# Patient Record
Sex: FEMALE | Race: WHITE | Employment: OTHER | URBAN - METROPOLITAN AREA
[De-identification: names, ages, dates, MRNs, and addresses within clinical notes are randomized per-mention and may not be internally consistent; named-entity substitution may affect disease eponyms.]

---

## 2017-06-26 LAB
AVERAGE GLUCOSE: NORMAL
HBA1C MFR BLD: 7.1 %

## 2017-07-06 ENCOUNTER — OFFICE VISIT (OUTPATIENT)
Dept: PRIMARY CARE CLINIC | Age: 57
End: 2017-07-06

## 2017-07-06 VITALS
WEIGHT: 194.7 LBS | RESPIRATION RATE: 14 BRPM | OXYGEN SATURATION: 98 % | DIASTOLIC BLOOD PRESSURE: 80 MMHG | HEART RATE: 100 BPM | HEIGHT: 60 IN | BODY MASS INDEX: 38.22 KG/M2 | SYSTOLIC BLOOD PRESSURE: 138 MMHG | TEMPERATURE: 98.5 F

## 2017-07-06 DIAGNOSIS — Z12.39 SCREENING FOR BREAST CANCER: ICD-10-CM

## 2017-07-06 DIAGNOSIS — E11.9 TYPE 2 DIABETES MELLITUS WITHOUT COMPLICATION, WITHOUT LONG-TERM CURRENT USE OF INSULIN (HCC): ICD-10-CM

## 2017-07-06 DIAGNOSIS — Z12.11 SCREEN FOR COLON CANCER: ICD-10-CM

## 2017-07-06 DIAGNOSIS — E11.9 TYPE 2 DIABETES MELLITUS WITHOUT COMPLICATION, WITHOUT LONG-TERM CURRENT USE OF INSULIN (HCC): Primary | ICD-10-CM

## 2017-07-06 DIAGNOSIS — Z12.31 SCREENING MAMMOGRAM, ENCOUNTER FOR: ICD-10-CM

## 2017-07-06 DIAGNOSIS — M62.830 BACK SPASM: ICD-10-CM

## 2017-07-06 LAB
CREATININE URINE: 47.9 MG/DL
MICROALBUMIN UR-MCNC: 2.5 MG/DL
MICROALBUMIN/CREAT UR-RTO: 52.2 MG/G (ref 0–30)

## 2017-07-06 PROCEDURE — 3017F COLORECTAL CA SCREEN DOC REV: CPT | Performed by: INTERNAL MEDICINE

## 2017-07-06 PROCEDURE — G8417 CALC BMI ABV UP PARAM F/U: HCPCS | Performed by: INTERNAL MEDICINE

## 2017-07-06 PROCEDURE — 4004F PT TOBACCO SCREEN RCVD TLK: CPT | Performed by: INTERNAL MEDICINE

## 2017-07-06 PROCEDURE — 3046F HEMOGLOBIN A1C LEVEL >9.0%: CPT | Performed by: INTERNAL MEDICINE

## 2017-07-06 PROCEDURE — G8427 DOCREV CUR MEDS BY ELIG CLIN: HCPCS | Performed by: INTERNAL MEDICINE

## 2017-07-06 PROCEDURE — 3014F SCREEN MAMMO DOC REV: CPT | Performed by: INTERNAL MEDICINE

## 2017-07-06 PROCEDURE — 99214 OFFICE O/P EST MOD 30 MIN: CPT | Performed by: INTERNAL MEDICINE

## 2017-07-06 RX ORDER — ACARBOSE 25 MG/1
25 TABLET ORAL DAILY
COMMUNITY
Start: 2017-07-01 | End: 2017-07-06 | Stop reason: SDUPTHER

## 2017-07-06 RX ORDER — OMEPRAZOLE 20 MG/1
20 CAPSULE, DELAYED RELEASE ORAL DAILY
COMMUNITY
Start: 2017-07-01 | End: 2019-01-22 | Stop reason: ALTCHOICE

## 2017-07-06 RX ORDER — ACARBOSE 25 MG/1
25 TABLET ORAL
Qty: 90 TABLET | Refills: 11 | Status: SHIPPED | OUTPATIENT
Start: 2017-07-06 | End: 2021-07-06 | Stop reason: SDUPTHER

## 2017-07-06 RX ORDER — AMITRIPTYLINE HYDROCHLORIDE 10 MG/1
10 TABLET, FILM COATED ORAL DAILY
COMMUNITY
Start: 2017-07-01 | End: 2022-04-01

## 2017-07-06 RX ORDER — OXYCODONE HYDROCHLORIDE AND ACETAMINOPHEN 5; 325 MG/1; MG/1
1 TABLET ORAL EVERY 6 HOURS PRN
COMMUNITY
Start: 2017-07-01

## 2017-07-06 RX ORDER — DIAZEPAM 5 MG/1
5 TABLET ORAL DAILY
Qty: 30 TABLET | Refills: 0 | Status: SHIPPED | OUTPATIENT
Start: 2017-07-06 | End: 2021-07-06 | Stop reason: ALTCHOICE

## 2017-07-06 RX ORDER — ATORVASTATIN CALCIUM 40 MG/1
40 TABLET, FILM COATED ORAL DAILY
COMMUNITY

## 2017-07-06 RX ORDER — DIAZEPAM 5 MG/1
5 TABLET ORAL EVERY 6 HOURS
COMMUNITY
Start: 2017-07-01 | End: 2017-07-06 | Stop reason: SDUPTHER

## 2017-07-06 RX ORDER — GABAPENTIN 400 MG/1
400 CAPSULE ORAL 3 TIMES DAILY
COMMUNITY
Start: 2017-06-08 | End: 2019-01-22 | Stop reason: ALTCHOICE

## 2017-07-06 RX ORDER — HYDROCODONE BITARTRATE AND ACETAMINOPHEN 5; 325 MG/1; MG/1
TABLET ORAL
COMMUNITY
Start: 2017-06-10 | End: 2017-07-06

## 2017-07-06 ASSESSMENT — PATIENT HEALTH QUESTIONNAIRE - PHQ9
1. LITTLE INTEREST OR PLEASURE IN DOING THINGS: 0
2. FEELING DOWN, DEPRESSED OR HOPELESS: 0
SUM OF ALL RESPONSES TO PHQ QUESTIONS 1-9: 0
SUM OF ALL RESPONSES TO PHQ9 QUESTIONS 1 & 2: 0

## 2017-07-08 ASSESSMENT — ENCOUNTER SYMPTOMS
CHOKING: 0
APNEA: 0
FACIAL SWELLING: 0
BLURRED VISION: 0
ABDOMINAL DISTENTION: 0
PHOTOPHOBIA: 0
BACK PAIN: 1
BLOOD IN STOOL: 0

## 2017-07-10 DIAGNOSIS — Z12.11 SCREENING FOR COLON CANCER: Primary | ICD-10-CM

## 2018-09-06 ENCOUNTER — HOSPITAL ENCOUNTER (OUTPATIENT)
Dept: GENERAL RADIOLOGY | Age: 58
Discharge: HOME | End: 2018-09-08
Payer: MEDICARE

## 2018-09-06 DIAGNOSIS — M54.5 LOW BACK PAIN, UNSPECIFIED BACK PAIN LATERALITY, UNSPECIFIED CHRONICITY, WITH SCIATICA PRESENCE UNSPECIFIED: ICD-10-CM

## 2018-09-06 PROCEDURE — 72110 X-RAY EXAM L-2 SPINE 4/>VWS: CPT

## 2018-10-03 ENCOUNTER — TELEPHONE (OUTPATIENT)
Dept: ENDOCRINOLOGY | Age: 58
End: 2018-10-03

## 2018-11-05 LAB
GLUCOSE BLD-MCNC: 140 MG/DL (ref 70–100)
GLUCOSE BLD-MCNC: 98 MG/DL (ref 70–100)
TROPONIN: 0.64 NG/ML (ref 0–0.04)

## 2018-11-06 LAB
ACTIVATED CLOTTING TIME, LOW RANGE: 280 SEC
ANION GAP SERPL CALCULATED.3IONS-SCNC: 12 MMOL/L (ref 10–20)
BICARBONATE: 24 MMOL/L (ref 21–32)
BUN / CREAT RATIO: 10 (ref 5–25)
CALCIUM SERPL-MCNC: 9.6 MG/DL (ref 8.6–10.3)
CHLORIDE BLD-SCNC: 108 MMOL/L (ref 98–107)
CHOLESTEROL/HDL RATIO: 6.6
CHOLESTEROL: 322 MG/DL
CREAT SERPL-MCNC: 0.7 MG/DL (ref 0.5–1.05)
ERYTHROCYTE [DISTWIDTH] IN BLOOD BY AUTOMATED COUNT: 13.6 % (ref 12–15.4)
ERYTHROCYTE [DISTWIDTH] IN BLOOD BY AUTOMATED COUNT: 44.5 FL (ref 39.3–48.6)
GFR CALCULATED: >60
GLUCOSE BLD-MCNC: 112 MG/DL (ref 70–100)
GLUCOSE BLD-MCNC: 189 MG/DL (ref 70–100)
GLUCOSE BLD-MCNC: 232 MG/DL (ref 70–100)
GLUCOSE BLD-MCNC: 244 MG/DL (ref 70–100)
GLUCOSE: 107 MG/DL (ref 70–100)
HBA1C MFR BLD: 6.1 % (ref 4–6)
HCT VFR BLD CALC: 40.1 % (ref 36.5–46.6)
HDLC SERPL-MCNC: 49 MG/DL
HEMOGLOBIN: 12.6 G/DL (ref 11.8–15.3)
LDL CHOLESTEROL CALCULATED: 220 MG/DL
MCH RBC QN AUTO: 28.3 PG (ref 27.5–33)
MCHC RBC AUTO-ENTMCNC: 31.4 G/DL (ref 30.1–35)
MCV RBC AUTO: 90.1 FL (ref 85.4–100)
NUCLEATED RBCS: 0 /100{WBCS}
PLATELET # BLD: 183 10*3/UL (ref 155–404)
PMV BLD AUTO: 11.2 FL (ref 9.9–12.1)
POTASSIUM SERPL-SCNC: 3.8 MMOL/L (ref 3.5–5.1)
RBC: 4.45 10*6/UL (ref 3.85–5.1)
RBCS COUNTED: 0 10*3/UL
SODIUM BLD-SCNC: 140 MMOL/L (ref 136–145)
TRIGL SERPL-MCNC: 265 MG/DL
TROPONIN: 0.64 NG/ML (ref 0–0.04)
UREA NITROGEN: 7 MG/DL (ref 6–23)
VLDLC SERPL CALC-MCNC: 53 MG/DL
WBC: 8.5 10*3/UL (ref 4.4–9.9)

## 2018-11-07 LAB
GLUCOSE BLD-MCNC: 114 MG/DL (ref 70–100)
GLUCOSE BLD-MCNC: 154 MG/DL (ref 70–100)
GLUCOSE BLD-MCNC: 205 MG/DL (ref 70–100)

## 2018-11-08 ENCOUNTER — TELEPHONE (OUTPATIENT)
Dept: PRIMARY CARE CLINIC | Age: 58
End: 2018-11-08

## 2018-11-09 ENCOUNTER — TELEPHONE (OUTPATIENT)
Dept: PRIMARY CARE CLINIC | Age: 58
End: 2018-11-09

## 2019-01-22 PROBLEM — D50.8 IRON DEFICIENCY ANEMIA DUE TO DIETARY CAUSES: Status: ACTIVE | Noted: 2019-01-22

## 2019-01-22 PROBLEM — D59.8 OTHER ACQUIRED HEMOLYTIC ANEMIAS (HCC): Status: ACTIVE | Noted: 2019-01-22

## 2019-02-19 PROBLEM — K90.9 MALABSORPTION OF IRON: Status: ACTIVE | Noted: 2019-02-19

## 2019-04-29 DIAGNOSIS — D50.8 IRON DEFICIENCY ANEMIA DUE TO DIETARY CAUSES: ICD-10-CM

## 2019-04-29 DIAGNOSIS — D59.8 OTHER ACQUIRED HEMOLYTIC ANEMIAS (HCC): ICD-10-CM

## 2019-04-29 LAB
FERRITIN: 73 NG/ML (ref 13–150)
IRON SATURATION: 18 % (ref 11–46)
IRON: 70 UG/DL (ref 37–145)
TOTAL IRON BINDING CAPACITY: 388 UG/DL (ref 178–450)

## 2019-10-23 DIAGNOSIS — Z12.31 ENCOUNTER FOR SCREENING MAMMOGRAM FOR MALIGNANT NEOPLASM OF BREAST: Primary | ICD-10-CM

## 2020-01-21 LAB — VITAMIN D 25-HYDROXY: 38 NG/ML

## 2020-06-27 ENCOUNTER — TELEPHONE (OUTPATIENT)
Dept: PRIMARY CARE CLINIC | Age: 60
End: 2020-06-27

## 2020-07-04 RX ORDER — AMOXICILLIN AND CLAVULANATE POTASSIUM 875; 125 MG/1; MG/1
1 TABLET, FILM COATED ORAL 2 TIMES DAILY
Qty: 20 TABLET | Refills: 0 | Status: SHIPPED | OUTPATIENT
Start: 2020-07-04 | End: 2020-07-14

## 2020-07-06 ENCOUNTER — NURSE ONLY (OUTPATIENT)
Dept: PRIMARY CARE CLINIC | Age: 60
End: 2020-07-06

## 2020-07-06 DIAGNOSIS — R50.9 FEVER, UNSPECIFIED FEVER CAUSE: ICD-10-CM

## 2020-07-09 LAB
SARS-COV-2: NOT DETECTED
SOURCE: NORMAL

## 2020-07-15 ENCOUNTER — TELEPHONE (OUTPATIENT)
Dept: PRIMARY CARE CLINIC | Age: 60
End: 2020-07-15

## 2020-08-20 ENCOUNTER — TELEPHONE (OUTPATIENT)
Dept: PRIMARY CARE CLINIC | Age: 60
End: 2020-08-20

## 2020-10-04 ENCOUNTER — TELEPHONE (OUTPATIENT)
Dept: PRIMARY CARE CLINIC | Age: 60
End: 2020-10-04

## 2020-10-04 NOTE — TELEPHONE ENCOUNTER
Juan Bailey was contacted to set up a video visit with Lorenzo Gray MD.     Spoke with: no answer     Patient encouraged to sign up for MyChart in order to complete Virtual Visits, if MyChart status was not already active. Patient not agreeableto sign up for a Virtual Visit with PCP within the next week. Reason for declining VV with PCP in the next week:  Other - left message       Helen Narayan

## 2021-07-06 ENCOUNTER — OFFICE VISIT (OUTPATIENT)
Dept: PRIMARY CARE CLINIC | Age: 61
End: 2021-07-06
Payer: MEDICARE

## 2021-07-06 VITALS
SYSTOLIC BLOOD PRESSURE: 134 MMHG | HEIGHT: 60 IN | OXYGEN SATURATION: 98 % | HEART RATE: 63 BPM | WEIGHT: 169 LBS | RESPIRATION RATE: 16 BRPM | DIASTOLIC BLOOD PRESSURE: 66 MMHG | BODY MASS INDEX: 33.18 KG/M2

## 2021-07-06 DIAGNOSIS — E11.9 TYPE 2 DIABETES MELLITUS WITHOUT COMPLICATION, WITHOUT LONG-TERM CURRENT USE OF INSULIN (HCC): ICD-10-CM

## 2021-07-06 DIAGNOSIS — R31.9 HEMATURIA, UNSPECIFIED TYPE: ICD-10-CM

## 2021-07-06 DIAGNOSIS — Z00.00 HEALTH CARE MAINTENANCE: ICD-10-CM

## 2021-07-06 DIAGNOSIS — R31.9 HEMATURIA, UNSPECIFIED TYPE: Primary | ICD-10-CM

## 2021-07-06 DIAGNOSIS — D59.8 OTHER ACQUIRED HEMOLYTIC ANEMIAS (HCC): ICD-10-CM

## 2021-07-06 DIAGNOSIS — Z12.31 ENCOUNTER FOR SCREENING MAMMOGRAM FOR BREAST CANCER: ICD-10-CM

## 2021-07-06 LAB
BACTERIA: NEGATIVE /HPF
BILIRUBIN URINE: NEGATIVE
BILIRUBIN, POC: NORMAL
BLOOD URINE, POC: NORMAL
BLOOD, URINE: ABNORMAL
CLARITY, POC: CLEAR
CLARITY: CLEAR
COLOR, POC: NORMAL
COLOR: YELLOW
CREATININE URINE: 43.2 MG/DL
EPITHELIAL CELLS, UA: ABNORMAL /HPF (ref 0–5)
GLUCOSE URINE, POC: NORMAL
GLUCOSE URINE: NEGATIVE MG/DL
HYALINE CASTS: ABNORMAL /HPF (ref 0–5)
KETONES, POC: NORMAL
KETONES, URINE: NEGATIVE MG/DL
LEUKOCYTE EST, POC: NORMAL
LEUKOCYTE ESTERASE, URINE: ABNORMAL
MICROALBUMIN UR-MCNC: <1.2 MG/DL
MICROALBUMIN/CREAT UR-RTO: NORMAL MG/G (ref 0–30)
NITRITE, POC: NORMAL
NITRITE, URINE: NEGATIVE
PH UA: 7.5 (ref 5–9)
PH, POC: 7
PROTEIN UA: NEGATIVE MG/DL
PROTEIN, POC: NORMAL
RBC UA: >100 /HPF (ref 0–5)
SPECIFIC GRAVITY UA: 1.01 (ref 1–1.03)
SPECIFIC GRAVITY, POC: 1.01
UROBILINOGEN, POC: NORMAL
UROBILINOGEN, URINE: 0.2 E.U./DL
WBC UA: ABNORMAL /HPF (ref 0–5)

## 2021-07-06 PROCEDURE — 81002 URINALYSIS NONAUTO W/O SCOPE: CPT | Performed by: INTERNAL MEDICINE

## 2021-07-06 PROCEDURE — 2022F DILAT RTA XM EVC RTNOPTHY: CPT | Performed by: INTERNAL MEDICINE

## 2021-07-06 PROCEDURE — 1036F TOBACCO NON-USER: CPT | Performed by: INTERNAL MEDICINE

## 2021-07-06 PROCEDURE — 3046F HEMOGLOBIN A1C LEVEL >9.0%: CPT | Performed by: INTERNAL MEDICINE

## 2021-07-06 PROCEDURE — 3017F COLORECTAL CA SCREEN DOC REV: CPT | Performed by: INTERNAL MEDICINE

## 2021-07-06 PROCEDURE — G8427 DOCREV CUR MEDS BY ELIG CLIN: HCPCS | Performed by: INTERNAL MEDICINE

## 2021-07-06 PROCEDURE — G8417 CALC BMI ABV UP PARAM F/U: HCPCS | Performed by: INTERNAL MEDICINE

## 2021-07-06 PROCEDURE — 99214 OFFICE O/P EST MOD 30 MIN: CPT | Performed by: INTERNAL MEDICINE

## 2021-07-06 RX ORDER — TIZANIDINE 4 MG/1
TABLET ORAL
COMMUNITY
Start: 2021-06-26

## 2021-07-06 RX ORDER — ACARBOSE 25 MG/1
25 TABLET ORAL
Qty: 90 TABLET | Refills: 1 | Status: SHIPPED | OUTPATIENT
Start: 2021-07-06 | End: 2021-09-30 | Stop reason: SDUPTHER

## 2021-07-06 RX ORDER — AMITRIPTYLINE HYDROCHLORIDE 25 MG/1
TABLET, FILM COATED ORAL
COMMUNITY
End: 2021-12-30

## 2021-07-06 SDOH — ECONOMIC STABILITY: FOOD INSECURITY: WITHIN THE PAST 12 MONTHS, THE FOOD YOU BOUGHT JUST DIDN'T LAST AND YOU DIDN'T HAVE MONEY TO GET MORE.: NEVER TRUE

## 2021-07-06 SDOH — ECONOMIC STABILITY: FOOD INSECURITY: WITHIN THE PAST 12 MONTHS, YOU WORRIED THAT YOUR FOOD WOULD RUN OUT BEFORE YOU GOT MONEY TO BUY MORE.: NEVER TRUE

## 2021-07-06 ASSESSMENT — ENCOUNTER SYMPTOMS
BACK PAIN: 1
SHORTNESS OF BREATH: 0
VOMITING: 0
WHEEZING: 0
COUGH: 0
DIARRHEA: 0
NAUSEA: 0
ABDOMINAL PAIN: 0

## 2021-07-06 ASSESSMENT — SOCIAL DETERMINANTS OF HEALTH (SDOH): HOW HARD IS IT FOR YOU TO PAY FOR THE VERY BASICS LIKE FOOD, HOUSING, MEDICAL CARE, AND HEATING?: NOT VERY HARD

## 2021-07-06 NOTE — PROGRESS NOTES
Subjective:      Patient ID: Juan Bailey is a 61 y.o. female    Blood in urine x 4 days   HPI  Pt presents with 4 days of pink-colored urine and blood clots in urine. Assoc burning urination, frequency and urgency. No fever or chills. Assoc lower back (not flank ) pain. No LE weakness. Pt has been on Plavix for 18 months s/p CAD with stents. Hx toacco smoking, quit smoking in 2018. HCC: other acquired hemolytic anemia- stable on iron infusion. Needs acarbose refill. LAs A1c 6.1  Past Medical History:   Diagnosis Date    Anxiety     Depression     Fibromyalgia     Hepatitis     Hyperlipidemia     Hypertension     Osteoarthritis     SHOULDER    Restless legs syndrome     Type II or unspecified type diabetes mellitus without mention of complication, not stated as uncontrolled      Past Surgical History:   Procedure Laterality Date    COLONOSCOPY      HYSTERECTOMY      MAMMO IMPLANT DIGITAL DIAG BI  03/02/2011    TONSILLECTOMY AND ADENOIDECTOMY       Social History     Socioeconomic History    Marital status:      Spouse name: Not on file    Number of children: Not on file    Years of education: Not on file    Highest education level: Not on file   Occupational History    Not on file   Tobacco Use    Smoking status: Former Smoker     Packs/day: 0.05     Types: Cigarettes    Smokeless tobacco: Never Used   Substance and Sexual Activity    Alcohol use:  Yes     Alcohol/week: 0.0 standard drinks     Comment: social    Drug use: No    Sexual activity: Not on file   Other Topics Concern    Not on file   Social History Narrative    Not on file     Social Determinants of Health     Financial Resource Strain: Low Risk     Difficulty of Paying Living Expenses: Not very hard   Food Insecurity: No Food Insecurity    Worried About Running Out of Food in the Last Year: Never true    Ba of Food in the Last Year: Never true   Transportation Needs:     Lack of Transportation (Medical):      Lack of Transportation (Non-Medical):    Physical Activity:     Days of Exercise per Week:     Minutes of Exercise per Session:    Stress:     Feeling of Stress :    Social Connections:     Frequency of Communication with Friends and Family:     Frequency of Social Gatherings with Friends and Family:     Attends Mosque Services:     Active Member of Clubs or Organizations:     Attends Club or Organization Meetings:     Marital Status:    Intimate Partner Violence:     Fear of Current or Ex-Partner:     Emotionally Abused:     Physically Abused:     Sexually Abused:      Family History   Problem Relation Age of Onset    Diabetes Mother     Diabetes Father     Heart Attack Father     Heart Disease Father     Diabetes Brother     Depression Daughter      Allergies:  Dye [iodides], Peanut (diagnostic), Avelox [moxifloxacin hcl in nacl], Metformin, and Sulfa antibiotics  Patient Active Problem List   Diagnosis    Type 2 diabetes mellitus without complication, without long-term current use of insulin ([facility] [address]    Depressive disorder, not elsewhere classified    Essential hypertension, benign    Low back pain    DDD (degenerative disc disease), lumbar    Ulnar neuropathy    Iron deficiency anemia due to dietary causes    Other acquired hemolytic anemias ([facility] [address]    Malabsorption of iron     Current Outpatient Medications on File Prior to Visit   Medication Sig Dispense Refill    amitriptyline (ELAVIL) 25 MG tablet Take by mouth      tiZANidine (ZANAFLEX) 4 MG tablet       clopidogrel (PLAVIX) 75 MG tablet Take 1 tablet by mouth daily  3    metoprolol tartrate (LOPRESSOR) 50 MG tablet Take 1 tablet by mouth 2 times daily  5    nitroGLYCERIN (NITROSTAT) 0.4 MG SL tablet Place 1 tablet under the tongue See Admin Instructions  3    topiramate (TOPAMAX) 100 MG tablet Take 1 tablet by mouth 2 times daily   5    magnesium oxide (MAG-OX) 400 MG tablet Take 400 mg by mouth daily      oxyCODONE-acetaminophen (PERCOCET) 5-325 MG per tablet Take 1 tablet by mouth every 6 hours as needed  .  atorvastatin (LIPITOR) 40 MG tablet Take 40 mg by mouth daily      amitriptyline (ELAVIL) 10 MG tablet Take 10 mg by mouth daily (Patient not taking: Reported on 7/6/2021)       No current facility-administered medications on file prior to visit. Review of Systems   Constitutional: Negative for chills, diaphoresis, fatigue and fever. HENT: Negative for congestion, ear discharge and ear pain. Respiratory: Negative for cough, shortness of breath and wheezing. Cardiovascular: Negative for chest pain. Gastrointestinal: Negative for abdominal pain, diarrhea, nausea and vomiting. Endocrine: Negative for cold intolerance and heat intolerance. Genitourinary: Positive for hematuria. Negative for dysuria, flank pain and frequency. Musculoskeletal: Positive for back pain. Neurological: Negative for dizziness and light-headedness. Psychiatric/Behavioral: Negative for dysphoric mood. The patient is not nervous/anxious. Objective:   /66 (Site: Right Upper Arm, Position: Sitting, Cuff Size: Medium Adult)   Pulse 63   Resp 16   Ht 5' (1.524 m)   Wt 169 lb (76.7 kg)   SpO2 98%   BMI 33.01 kg/m²     Physical Exam  Constitutional:       General: She is not in acute distress. Appearance: She is not diaphoretic. Cardiovascular:      Rate and Rhythm: Normal rate and regular rhythm. Pulses: Normal pulses. Heart sounds: Normal heart sounds, S1 normal and S2 normal.   Pulmonary:      Effort: Pulmonary effort is normal. No respiratory distress. Breath sounds: Normal breath sounds. No wheezing or rales. Chest:      Chest wall: No tenderness. Abdominal:      General: Bowel sounds are normal.      Tenderness: There is no abdominal tenderness. There is no right CVA tenderness or left CVA tenderness. Neurological:      General: No focal deficit present.       Mental Expiration Date:   7/6/2022    POCT Urinalysis no Micro     Results for POC orders placed in visit on 07/06/21   POCT Urinalysis no Micro   Result Value Ref Range    Color, UA Light Yellow     Clarity, UA clear     Glucose, UA POC -     Bilirubin, UA -     Ketones, UA -     Spec Grav, UA 1.015     Blood, UA POC 3+     pH, UA 7.0     Protein, UA POC -     Urobilinogen, UA -     Leukocytes, UA -     Nitrite, UA -      Orders Placed This Encounter   Medications    acarbose (PRECOSE) 25 MG tablet     Sig: Take 1 tablet by mouth 3 times daily (with meals)     Dispense:  90 tablet     Refill:  1      No follow-ups on file.

## 2021-07-08 ENCOUNTER — HOSPITAL ENCOUNTER (OUTPATIENT)
Dept: CT IMAGING | Age: 61
Discharge: HOME | End: 2021-07-10
Payer: MEDICARE

## 2021-07-08 DIAGNOSIS — N30.01 ACUTE CYSTITIS WITH HEMATURIA: ICD-10-CM

## 2021-07-08 DIAGNOSIS — R31.9 HEMATURIA, UNSPECIFIED TYPE: ICD-10-CM

## 2021-07-08 DIAGNOSIS — N39.0 URINARY TRACT INFECTION WITHOUT HEMATURIA, SITE UNSPECIFIED: Primary | ICD-10-CM

## 2021-07-08 PROCEDURE — 74176 CT ABD & PELVIS W/O CONTRAST: CPT

## 2021-07-08 RX ORDER — NITROFURANTOIN 25; 75 MG/1; MG/1
100 CAPSULE ORAL 2 TIMES DAILY
Qty: 14 CAPSULE | Refills: 0 | Status: SHIPPED | OUTPATIENT
Start: 2021-07-08 | End: 2021-07-15

## 2021-07-22 ENCOUNTER — TELEPHONE (OUTPATIENT)
Dept: PRIMARY CARE CLINIC | Age: 61
End: 2021-07-22

## 2021-08-02 ENCOUNTER — HOSPITAL ENCOUNTER (OUTPATIENT)
Dept: WOMENS IMAGING | Age: 61
Discharge: HOME | End: 2021-08-04
Payer: MEDICARE

## 2021-08-02 VITALS — BODY MASS INDEX: 33.01 KG/M2 | HEIGHT: 60 IN

## 2021-08-02 DIAGNOSIS — Z12.31 ENCOUNTER FOR SCREENING MAMMOGRAM FOR BREAST CANCER: ICD-10-CM

## 2021-08-02 LAB
CHOLESTEROL/HDL RATIO: 3.1
CHOLESTEROL: 183 MG/DL (ref 0–199)
ESTIMATED AVERAGE GLUCOSE: 131 MG/DL
HBA1C MFR BLD: 6.2 %
HDLC SERPL-MCNC: 60 MG/DL
HEPATITIS C ANTIBODY: NONREACTIVE
LDL CHOLESTEROL: 104 MG/DL (ref 0–99)
SPECIMEN SOURCE: NORMAL
TRIGL SERPL-MCNC: 97 MG/DL (ref 0–149)
VLDLC SERPL CALC-MCNC: 19 MG/DL (ref 0–40)

## 2021-08-02 PROCEDURE — 77063 BREAST TOMOSYNTHESIS BI: CPT

## 2021-08-02 ASSESSMENT — LIFESTYLE VARIABLES
AUDIT TOTAL SCORE: 0
HOW OFTEN DO YOU HAVE A DRINK CONTAINING ALCOHOL: 1
HOW MANY STANDARD DRINKS CONTAINING ALCOHOL DO YOU HAVE ON A TYPICAL DAY: ONE OR TWO
HOW OFTEN DURING THE LAST YEAR HAVE YOU BEEN UNABLE TO REMEMBER WHAT HAPPENED THE NIGHT BEFORE BECAUSE YOU HAD BEEN DRINKING: NEVER
AUDIT-C TOTAL SCORE: 1
HAS A RELATIVE, FRIEND, DOCTOR, OR ANOTHER HEALTH PROFESSIONAL EXPRESSED CONCERN ABOUT YOUR DRINKING OR SUGGESTED YOU CUT DOWN: NO
HOW MANY STANDARD DRINKS CONTAINING ALCOHOL DO YOU HAVE ON A TYPICAL DAY: 0
HAVE YOU OR SOMEONE ELSE BEEN INJURED AS A RESULT OF YOUR DRINKING: NO
AUDIT TOTAL SCORE: 1
HAS A RELATIVE, FRIEND, DOCTOR, OR ANOTHER HEALTH PROFESSIONAL EXPRESSED CONCERN ABOUT YOUR DRINKING OR SUGGESTED YOU CUT DOWN: 0
HAVE YOU OR SOMEONE ELSE BEEN INJURED AS A RESULT OF YOUR DRINKING: 0
HOW OFTEN DO YOU HAVE A DRINK CONTAINING ALCOHOL: MONTHLY OR LESS
HOW OFTEN DURING THE LAST YEAR HAVE YOU NEEDED AN ALCOHOLIC DRINK FIRST THING IN THE MORNING TO GET YOURSELF GOING AFTER A NIGHT OF HEAVY DRINKING: 0
AUDIT-C TOTAL SCORE: 0
HOW OFTEN DURING THE LAST YEAR HAVE YOU FAILED TO DO WHAT WAS NORMALLY EXPECTED FROM YOU BECAUSE OF DRINKING: NEVER
HOW OFTEN DO YOU HAVE SIX OR MORE DRINKS ON ONE OCCASION: NEVER
HOW OFTEN DURING THE LAST YEAR HAVE YOU BEEN UNABLE TO REMEMBER WHAT HAPPENED THE NIGHT BEFORE BECAUSE YOU HAD BEEN DRINKING: 0
HOW OFTEN DURING THE LAST YEAR HAVE YOU FAILED TO DO WHAT WAS NORMALLY EXPECTED FROM YOU BECAUSE OF DRINKING: 0
HOW OFTEN DURING THE LAST YEAR HAVE YOU HAD A FEELING OF GUILT OR REMORSE AFTER DRINKING: 0
HOW OFTEN DURING THE LAST YEAR HAVE YOU HAD A FEELING OF GUILT OR REMORSE AFTER DRINKING: NEVER
HOW OFTEN DURING THE LAST YEAR HAVE YOU NEEDED AN ALCOHOLIC DRINK FIRST THING IN THE MORNING TO GET YOURSELF GOING AFTER A NIGHT OF HEAVY DRINKING: NEVER
HOW OFTEN DURING THE LAST YEAR HAVE YOU FOUND THAT YOU WERE NOT ABLE TO STOP DRINKING ONCE YOU HAD STARTED: 0
HOW OFTEN DO YOU HAVE SIX OR MORE DRINKS ON ONE OCCASION: 0
HOW OFTEN DURING THE LAST YEAR HAVE YOU FOUND THAT YOU WERE NOT ABLE TO STOP DRINKING ONCE YOU HAD STARTED: NEVER

## 2021-08-02 ASSESSMENT — PATIENT HEALTH QUESTIONNAIRE - PHQ9
SUM OF ALL RESPONSES TO PHQ9 QUESTIONS 1 & 2: 0
2. FEELING DOWN, DEPRESSED OR HOPELESS: 0
SUM OF ALL RESPONSES TO PHQ QUESTIONS 1-9: 0
SUM OF ALL RESPONSES TO PHQ QUESTIONS 1-9: 0
1. LITTLE INTEREST OR PLEASURE IN DOING THINGS: 0
SUM OF ALL RESPONSES TO PHQ QUESTIONS 1-9: 0

## 2021-08-03 ENCOUNTER — HOSPITAL ENCOUNTER (OUTPATIENT)
Dept: MRI IMAGING | Age: 61
Discharge: HOME | End: 2021-08-05
Payer: MEDICARE

## 2021-08-03 ENCOUNTER — OFFICE VISIT (OUTPATIENT)
Dept: PRIMARY CARE CLINIC | Age: 61
End: 2021-08-03
Payer: MEDICARE

## 2021-08-03 VITALS
DIASTOLIC BLOOD PRESSURE: 72 MMHG | BODY MASS INDEX: 32.39 KG/M2 | SYSTOLIC BLOOD PRESSURE: 138 MMHG | TEMPERATURE: 98.1 F | WEIGHT: 165 LBS | HEART RATE: 78 BPM | HEIGHT: 60 IN | OXYGEN SATURATION: 98 %

## 2021-08-03 DIAGNOSIS — R31.9 HEMATURIA, UNSPECIFIED TYPE: ICD-10-CM

## 2021-08-03 DIAGNOSIS — R31.9 HEMATURIA, UNSPECIFIED TYPE: Primary | ICD-10-CM

## 2021-08-03 DIAGNOSIS — M75.102 TEAR OF LEFT ROTATOR CUFF, UNSPECIFIED TEAR EXTENT, UNSPECIFIED WHETHER TRAUMATIC: ICD-10-CM

## 2021-08-03 DIAGNOSIS — Z00.00 ROUTINE GENERAL MEDICAL EXAMINATION AT A HEALTH CARE FACILITY: ICD-10-CM

## 2021-08-03 LAB
BILIRUBIN URINE: NEGATIVE
BLOOD, URINE: NEGATIVE
CLARITY: CLEAR
COLOR: YELLOW
GLUCOSE URINE: NEGATIVE MG/DL
KETONES, URINE: NEGATIVE MG/DL
LEUKOCYTE ESTERASE, URINE: NEGATIVE
NITRITE, URINE: NEGATIVE
PH UA: 5.5 (ref 5–9)
PROTEIN UA: NEGATIVE MG/DL
SPECIFIC GRAVITY UA: 1.01 (ref 1–1.03)
UROBILINOGEN, URINE: 0.2 E.U./DL

## 2021-08-03 PROCEDURE — 73221 MRI JOINT UPR EXTREM W/O DYE: CPT

## 2021-08-03 PROCEDURE — 3017F COLORECTAL CA SCREEN DOC REV: CPT | Performed by: INTERNAL MEDICINE

## 2021-08-03 PROCEDURE — G0438 PPPS, INITIAL VISIT: HCPCS | Performed by: INTERNAL MEDICINE

## 2021-08-03 RX ORDER — BLOOD SUGAR DIAGNOSTIC
1 STRIP MISCELLANEOUS DAILY
COMMUNITY
End: 2021-09-30 | Stop reason: SDUPTHER

## 2021-08-03 RX ORDER — CLONAZEPAM 0.5 MG/1
TABLET ORAL
COMMUNITY
Start: 2021-07-13

## 2021-08-03 ASSESSMENT — ENCOUNTER SYMPTOMS
APNEA: 0
ABDOMINAL DISTENTION: 0
FACIAL SWELLING: 0
BLOOD IN STOOL: 0
PHOTOPHOBIA: 0
CHOKING: 0
BACK PAIN: 1

## 2021-08-03 NOTE — PROGRESS NOTES
Inocencio Alvarado 61 y.o. female presents today with No chief complaint on file. HPI annual wellness visit  had hematuria  Shoulder surgery to get another mri left shoulder. Pain back and right hip. Had shots  Neuropathy, seeing dr Pepito Vargas. Fibromyalgia, see dr Manuel Clark.      Past Medical History:   Diagnosis Date    Anxiety     Depression     Fibromyalgia     Hepatitis     Hyperlipidemia     Hypertension     Osteoarthritis     SHOULDER    Restless legs syndrome     Type II or unspecified type diabetes mellitus without mention of complication, not stated as uncontrolled      Patient Active Problem List    Diagnosis Date Noted    Malabsorption of iron 2019    Iron deficiency anemia due to dietary causes 2019    Other acquired hemolytic anemias ([facility] [address] 2019    Low back pain 2015    DDD (degenerative disc disease), lumbar 2015    Ulnar neuropathy 2015    Depressive disorder, not elsewhere classified 2012    Essential hypertension, benign 2012    Type 2 diabetes mellitus without complication, without long-term current use of insulin ([facility] [address] 10/04/2011     Past Surgical History:   Procedure Laterality Date    COLONOSCOPY      HYSTERECTOMY      MAMMO IMPLANT DIGITAL DIAG BI  2011    TONSILLECTOMY AND ADENOIDECTOMY       Family History   Problem Relation Age of Onset    Diabetes Mother     Diabetes Father     Heart Attack Father     Heart Disease Father     Diabetes Brother     Depression Daughter      Social History     Socioeconomic History    Marital status:      Spouse name: None    Number of children: None    Years of education: None    Highest education level: None   Occupational History    None   Tobacco Use    Smoking status: Former Smoker     Packs/day: 0.05     Years: 20.00     Pack years: 1.00     Types: Cigarettes     Quit date: 11/3/2018     Years since quittin.7    Smokeless tobacco: Never Used   Substance and Sexual Activity    Alcohol use: Yes     Alcohol/week: 0.0 standard drinks     Comment: social    Drug use: No    Sexual activity: None   Other Topics Concern    None   Social History Narrative    None     Social Determinants of Health     Financial Resource Strain: Low Risk     Difficulty of Paying Living Expenses: Not very hard   Food Insecurity: No Food Insecurity    Worried About Running Out of Food in the Last Year: Never true    Ba of Food in the Last Year: Never true   Transportation Needs:     Lack of Transportation (Medical):  Lack of Transportation (Non-Medical):    Physical Activity:     Days of Exercise per Week:     Minutes of Exercise per Session:    Stress:     Feeling of Stress :    Social Connections:     Frequency of Communication with Friends and Family:     Frequency of Social Gatherings with Friends and Family:     Attends Gnosticism Services:     Active Member of Clubs or Organizations:     Attends Club or Organization Meetings:     Marital Status:    Intimate Partner Violence:     Fear of Current or Ex-Partner:     Emotionally Abused:     Physically Abused:     Sexually Abused: Allergies   Allergen Reactions    Dye [Iodides] Anaphylaxis    Avelox [Moxifloxacin Hcl In Nacl]     Lyrica [Pregabalin]      confusion    Metformin Hives    Neurontin [Gabapentin]      No focus    Sulfa Antibiotics Hives and Rash       Review of Systems   Constitutional: Negative for chills and fever. HENT: Negative for facial swelling and nosebleeds. Eyes: Negative for photophobia and visual disturbance. Respiratory: Negative for apnea and choking. Cardiovascular: Negative for chest pain and palpitations. Gastrointestinal: Negative for abdominal distention and blood in stool. Genitourinary: Negative for enuresis and hematuria. Musculoskeletal: Positive for arthralgias, back pain, myalgias and neck stiffness. Negative for gait problem and joint swelling.    Skin: Negative for rash. Neurological: Negative for syncope and speech difficulty. Hematological: Does not bruise/bleed easily. Psychiatric/Behavioral: Positive for agitation. Negative for hallucinations and suicidal ideas. Vitals:    21 1456   BP: 138/72   Site: Right Upper Arm   Cuff Size: Large Adult   Pulse: 78   Temp: 98.1 °F (36.7 °C)   SpO2: 98%   Weight: 165 lb (74.8 kg)   Height: 5' (1.524 m)       Physical Exam  Constitutional:       Appearance: She is well-developed. HENT:      Head: Normocephalic and atraumatic. Eyes:      Pupils: Pupils are equal, round, and reactive to light. Cardiovascular:      Rate and Rhythm: Normal rate and regular rhythm. Heart sounds: Normal heart sounds. Pulmonary:      Effort: No respiratory distress. Breath sounds: Normal breath sounds. No wheezing. Abdominal:      General: Bowel sounds are normal. There is no distension. Musculoskeletal:         General: Normal range of motion. Cervical back: Normal range of motion and neck supple. Skin:     Coloration: Skin is not jaundiced. Neurological:      Mental Status: She is alert and oriented to person, place, and time. Cranial Nerves: No cranial nerve deficit. Psychiatric:         Mood and Affect: Mood normal.      to consider urology if still hematuria  Assessment/Plan  Diagnoses and all orders for this visit:    Hematuria, unspecified type  -     Urinalysis; Future        No follow-ups on file. Jermaine Benson MD  Medicare Annual Wellness Visit  Name: Alessandra Fabry Date: 2021   MRN: [id number] Sex: Female   Age: 61 y.o. Ethnicity: Non- / Non    : 1960 Race: White (non-)      Garima Duenas is here for Hematuria    Screenings for behavioral, psychosocial and functional/safety risks, and cognitive dysfunction are all negative except as indicated below.  These results, as well as other patient data from the Health Risk Assessment form, are documented in Flowsheets linked to this Encounter. Allergies   Allergen Reactions    Dye [Iodides] Anaphylaxis    Avelox [Moxifloxacin Hcl In Nacl]     Lyrica [Pregabalin]      confusion    Metformin Hives    Neurontin [Gabapentin]      No focus    Sulfa Antibiotics Hives and Rash       Prior to Visit Medications    Medication Sig Taking? Authorizing Provider   blood glucose test strips (EXACTECH TEST) strip 1 each by In Vitro route daily As needed. Yes Historical Provider, MD   amitriptyline (ELAVIL) 25 MG tablet Take by mouth Yes Historical Provider, MD   tiZANidine (ZANAFLEX) 4 MG tablet  Yes Historical Provider, MD   acarbose (PRECOSE) 25 MG tablet Take 1 tablet by mouth 3 times daily (with meals) Yes Ailyn Aragon MD   clopidogrel (PLAVIX) 75 MG tablet Take 1 tablet by mouth daily Yes Historical Provider, MD   metoprolol tartrate (LOPRESSOR) 50 MG tablet Take 1 tablet by mouth 2 times daily Yes Historical Provider, MD   nitroGLYCERIN (NITROSTAT) 0.4 MG SL tablet Place 1 tablet under the tongue See Admin Instructions Yes Historical Provider, MD   topiramate (TOPAMAX) 100 MG tablet Take 1 tablet by mouth 2 times daily  Yes Historical Provider, MD   magnesium oxide (MAG-OX) 400 MG tablet Take 400 mg by mouth daily Yes Historical Provider, MD   oxyCODONE-acetaminophen (PERCOCET) 5-325 MG per tablet Take 1 tablet by mouth every 6 hours as needed  .  Yes Historical Provider, MD   atorvastatin (LIPITOR) 40 MG tablet Take 40 mg by mouth daily Yes Historical Provider, MD   clonazePAM (KLONOPIN) 0.5 MG tablet TAKE ONE TABLET BY MOUTH AT BEDTIME AS TOLERATED  Historical Provider, MD   amitriptyline (ELAVIL) 10 MG tablet Take 10 mg by mouth daily  Patient not taking: Reported on 8/3/2021  Historical Provider, MD       Past Medical History:   Diagnosis Date    Anxiety     Depression     Fibromyalgia     Hepatitis     Hyperlipidemia     Hypertension     Osteoarthritis     SHOULDER    Restless legs syndrome     Type II or unspecified type diabetes mellitus without mention of complication, not stated as uncontrolled        Past Surgical History:   Procedure Laterality Date    COLONOSCOPY      HYSTERECTOMY      MAMMO IMPLANT DIGITAL DIAG BI  03/02/2011    TONSILLECTOMY AND ADENOIDECTOMY         Family History   Problem Relation Age of Onset    Diabetes Mother     Diabetes Father     Heart Attack Father     Heart Disease Father     Diabetes Brother     Depression Daughter        CareTeam (Including outside providers/suppliers regularly involved in providing care):   Patient Care Team:  Christa Kim MD as PCP - General (Internal Medicine)  Christa Kim MD as PCP - [facility]    Wt Readings from Last 3 Encounters:   08/03/21 165 lb (74.8 kg)   07/06/21 169 lb (76.7 kg)   05/27/20 165 lb (74.8 kg)     Vitals:    08/03/21 1456   BP: 138/72   Site: Right Upper Arm   Cuff Size: Large Adult   Pulse: 78   Temp: 98.1 °F (36.7 °C)   SpO2: 98%   Weight: 165 lb (74.8 kg)   Height: 5' (1.524 m)     Body mass index is 32.22 kg/m². Based upon direct observation of the patient, evaluation of cognition reveals recent and remote memory intact. Patient's complete Health Risk Assessment and screening values have been reviewed and are found in Flowsheets. The following problems were reviewed today and where indicated follow up appointments were made and/or referrals ordered. Positive Risk Factor Screenings with Interventions:          General Health and ACP:  General  In general, how would you say your health is?: Fair  In the past 7 days, have you experienced any of the following?  New or Increased Pain, New or Increased Fatigue, Loneliness, Social Isolation, Stress or Anger?: None of These  Do you get the social and emotional support that you need?: Yes  Do you have a Living Will?: (!) No  Advance Directives     Power of  Living Will ACP-Advance Directive ACP-Power of     Not on File Not on File Not on File Not on File      General Health Risk Interventions:  · discussed    Health Habits/Nutrition:  Health Habits/Nutrition  Do you exercise for at least 20 minutes 2-3 times per week?: (!) No  Have you lost any weight without trying in the past 3 months?: No  Do you eat only one meal per day?: No  Have you seen the dentist within the past year?: (!) No  Body mass index: (!) 32.22  Health Habits/Nutrition Interventions:  · discussed       Personalized Preventive Plan   Current Health Maintenance Status  Immunization History   Administered Date(s) Administered    COVID-19, Moderna, PF, 100mcg/0.5mL 02/04/2021, 03/14/2021    Influenza 10/28/2013    Influenza, Quadv, IM, (6 mo and older Fluzone, Flulaval, Fluarix and 3 yrs and older Afluria) 10/28/2013    Influenza, Quadv, IM, PF (6 mo and older Fluzone, Flulaval, Fluarix, and 3 yrs and older Afluria) 09/25/2020    Pneumococcal Polysaccharide ([id number]) 09/27/2020        Health Maintenance   Topic Date Due    Diabetic foot exam  Never done    HIV screen  Never done    DTaP/Tdap/Td vaccine (1 - Tdap) Never done    Annual Wellness Visit (AWV)  Never done    Diabetic retinal exam  07/16/2022 (Originally 9/1/2015)    Shingles Vaccine (1 of 2) 08/02/2026 (Originally 12/23/2010)    Flu vaccine (1) 09/01/2021    Potassium monitoring  03/05/2022    Creatinine monitoring  03/05/2022    Diabetic microalbuminuria test  07/06/2022    A1C test (Diabetic or Prediabetic)  08/02/2022    Lipid screen  08/02/2022    Breast cancer screen  08/02/2023    Pneumococcal 0-64 years Vaccine (2 of 2 - PPSV23) 12/23/2025    Colon cancer screen colonoscopy  01/06/2026    COVID-19 Vaccine  Completed    Hepatitis C screen  Completed    Hepatitis A vaccine  Aged Out    Hib vaccine  Aged Out    Meningococcal (ACWY) vaccine  Aged Out     Recommendations for Displair Due: see orders and patient instructions/AVS.  .   Recommended screening schedule for the next 5-10 years is provided to the patient in written form: see Patient Malissa White was seen today for hematuria. Diagnoses and all orders for this visit:    Hematuria, unspecified type  -     Urinalysis;  Future    Routine general medical examination at a health care [facility]

## 2021-08-19 NOTE — PATIENT INSTRUCTIONS
Personalized Preventive Plan for Sandra Pi - 8/3/2021  Medicare offers a range of preventive health benefits. Some of the tests and screenings are paid in full while other may be subject to a deductible, co-insurance, and/or copay. Some of these benefits include a comprehensive review of your medical history including lifestyle, illnesses that may run in your family, and various assessments and screenings as appropriate. After reviewing your medical record and screening and assessments performed today your provider may have ordered immunizations, labs, imaging, and/or referrals for you. A list of these orders (if applicable) as well as your Preventive Care list are included within your After Visit Summary for your review. Other Preventive Recommendations:    · A preventive eye exam performed by an eye specialist is recommended every 1-2 years to screen for glaucoma; cataracts, macular degeneration, and other eye disorders. · A preventive dental visit is recommended every 6 months. · Try to get at least 150 minutes of exercise per week or 10,000 steps per day on a pedometer . · Order or download the FREE \"Exercise & Physical Activity: Your Everyday Guide\" from [employer]. Call 6-325.227.6745 or search [employer] online. · You need 9903-9965 mg of calcium and 5779-6359 IU of vitamin D per day. It is possible to meet your calcium requirement with diet alone, but a vitamin D supplement is usually necessary to meet this goal.  · When exposed to the sun, use a sunscreen that protects against both UVA and UVB radiation with an SPF of 30 or greater. Reapply every 2 to 3 hours or after sweating, drying off with a towel, or swimming. · Always wear a seat belt when traveling in a car. Always wear a helmet when riding a bicycle or motorcycle.

## 2021-09-30 ENCOUNTER — OFFICE VISIT (OUTPATIENT)
Dept: PRIMARY CARE CLINIC | Age: 61
End: 2021-09-30
Payer: MEDICARE

## 2021-09-30 VITALS
TEMPERATURE: 96.5 F | BODY MASS INDEX: 31.41 KG/M2 | OXYGEN SATURATION: 98 % | RESPIRATION RATE: 18 BRPM | DIASTOLIC BLOOD PRESSURE: 62 MMHG | HEART RATE: 86 BPM | WEIGHT: 160 LBS | SYSTOLIC BLOOD PRESSURE: 128 MMHG | HEIGHT: 60 IN

## 2021-09-30 DIAGNOSIS — E11.9 TYPE 2 DIABETES MELLITUS WITHOUT COMPLICATION, WITHOUT LONG-TERM CURRENT USE OF INSULIN (HCC): ICD-10-CM

## 2021-09-30 DIAGNOSIS — M25.512 CHRONIC LEFT SHOULDER PAIN: Primary | ICD-10-CM

## 2021-09-30 DIAGNOSIS — G89.29 CHRONIC LEFT SHOULDER PAIN: Primary | ICD-10-CM

## 2021-09-30 DIAGNOSIS — Z79.899 MEDICATION MANAGEMENT: ICD-10-CM

## 2021-09-30 DIAGNOSIS — Z01.818 PRE-OP EXAM: ICD-10-CM

## 2021-09-30 PROCEDURE — G8427 DOCREV CUR MEDS BY ELIG CLIN: HCPCS | Performed by: INTERNAL MEDICINE

## 2021-09-30 PROCEDURE — 1036F TOBACCO NON-USER: CPT | Performed by: INTERNAL MEDICINE

## 2021-09-30 PROCEDURE — 2022F DILAT RTA XM EVC RTNOPTHY: CPT | Performed by: INTERNAL MEDICINE

## 2021-09-30 PROCEDURE — 3017F COLORECTAL CA SCREEN DOC REV: CPT | Performed by: INTERNAL MEDICINE

## 2021-09-30 PROCEDURE — 3044F HG A1C LEVEL LT 7.0%: CPT | Performed by: INTERNAL MEDICINE

## 2021-09-30 PROCEDURE — G8417 CALC BMI ABV UP PARAM F/U: HCPCS | Performed by: INTERNAL MEDICINE

## 2021-09-30 PROCEDURE — 99213 OFFICE O/P EST LOW 20 MIN: CPT | Performed by: INTERNAL MEDICINE

## 2021-09-30 RX ORDER — ACARBOSE 25 MG/1
25 TABLET ORAL
Qty: 90 TABLET | Refills: 3 | Status: SHIPPED | OUTPATIENT
Start: 2021-09-30

## 2021-09-30 RX ORDER — BLOOD SUGAR DIAGNOSTIC
1 STRIP MISCELLANEOUS DAILY
Qty: 100 EACH | Refills: 5 | Status: SHIPPED | OUTPATIENT
Start: 2021-09-30

## 2021-09-30 ASSESSMENT — ENCOUNTER SYMPTOMS
BLOOD IN STOOL: 0
CHOKING: 0
ABDOMINAL DISTENTION: 0
APNEA: 0
PHOTOPHOBIA: 0
FACIAL SWELLING: 0
BACK PAIN: 1

## 2021-09-30 NOTE — LETTER
CONTROLLED SUBSTANCE MEDICATION AGREEMENT     Patient Name: Tito Wetzel  Patient YOB: 1960   I understand, that controlled substance medications may be used to help better manage my symptoms and to improve my ability to function at home, work and in social settings. However, I also understand that these medications do have risks, which have been discussed with me, including possible development of physical or psychological dependence. I understand that successful treatment requires mutual trust and honesty between me and my provider. I understand and agree that following this Medication Agreement is necessary in continuing my provider-patient relationship and the success of my treatment plan. Explanation from my Provider: Benefits and Goals of Controlled Substance Medications: There are two potential goals for your treatment: (1) decreased pain and suffering (2) improved daily life functions. There are many possible treatments for your chronic condition(s). Alternatives such as physical therapy, yoga, massage, home daily exercise, meditation, relaxation techniques, injections, chiropractic manipulations, surgery, cognitive therapy, hypnosis and many medications that are not habit-forming may be used. Use of controlled substance medications may be helpful, but they are unlikely to resolve all symptoms or restore all function. Explanation from my Provider: Risks of Controlled Substance Medications:  Opioid pain medications: These medications can lead to problems such as addiction/dependence, sedation, lightheadedness/dizziness, memory issues, falls, constipation, nausea, or vomiting. They may also impair the ability to drive or operate machinery. Additionally, these medications may lower testosterone levels, leading to loss of bone strength, stamina and sex drive.   They may cause problems with breathing, sleep apnea and reduced coughing, which is especially dangerous for patients with lung disease. Overdose or dangerous interactions with alcohol and other medications may occur, leading to death. Hyperalgesia may develop, which means patients receiving opioids for the treatment of pain may become more sensitive to certain painful stimuli, and in some cases, experience pain from ordinarily non-painful stimuli. Women between the ages of 14-53 who could become pregnant should carefully weigh the risks and benefits of opioids with their physicians, as these medications increase the risk of pregnancy complications, including miscarriage,  delivery and stillbirth. It is also possible for babies to be born addicted to opioids. Opioid dependence withdrawal symptoms may include; feelings of uneasiness, increased pain, irritability, belly pain, diarrhea, sweats and goose-flesh. Benzodiazepines and non-benzodiazepine sleep medications: These medications can lead to problems such as addiction/dependence, sedation, fatigue, lightheadedness, dizziness, incoordination, falls, depression, hallucinations, and impaired judgment, memory and concentration. The ability to drive and operate machinery may also be affected. Abnormal sleep-related behaviors have been reported, including sleepwalking, driving, making telephone calls, eating, or having sex while not fully awake. These medications can suppress breathing and worsen sleep apnea, particularly when combined with alcohol or other sedating medications, potentially leading to death. Dependence withdrawal symptoms may include tremors, anxiety, hallucinations and seizures. Stimulants:  Common adverse effects include addiction/dependence, increased blood  pressure and heart rate, decreased appetite, nausea, involuntary weight loss, insomnia,                                                                                                                     Initials:_______   irritability, and headaches.   These risks may increase when these medications agreement among other prescribers of controlled substances outlining the responsibility of the medications being prescribed.  I understand that the if the controlled medication is not helping to achieve goals, the dosage may be tapered and no longer prescribed. 3. MY RESPONSIBILITY FOR COMMUNICATION / PRESCRIPTION RENEWALS   I agree that all controlled substance medications that I take will be prescribed only by my provider. If another healthcare provider prescribes me medication in an emergency, I will notify my provider within seventy-two (72) hours.  I will arrange for refills at the prescribed interval ONLY during regular office hours. I will not ask for refills earlier than agreed, after-hours, on holidays or weekends. Refills may take up to 72 hours for processing and prescriptions to reach the pharmacy.  I will inform my other health care providers that I am taking these medications and of the existence of this [id number]. In the event of an emergency, I will provide the same information to the emergency department prescribers.  I will keep my provider updated on the pharmacy I am using for controlled medication prescription filling. Initials:_______  4. MY RESPONSIBILITY FOR PROTECTING MEDICATIONS   I will protect my prescriptions and medications. I understand that lost or misplaced prescriptions will not be replaced.  I will keep medications only for my own use and will not share them with others. I will keep all medications away from children.  I agree that if my medications are adjusted or discontinued, I will properly dispose of any remaining medications. I understand that I will be required to dispose of any remaining controlled medications as, directed by my prescriber, prior to being provided with any prescriptions for other controlled medications.   Medication drop box locations can be found at: [email or web address]    5. MY RESPONSIBILITY WITH ILLEGAL DRUGS    I will not use illegal or street drugs or another person's prescription medications not prescribed to me.  If there are identified addiction type symptoms, then referral to a program may be provided by my provider and I agree to follow through with this recommendation. 6. MY RESPONSIBILITY FOR COOPERATION WITH INVESTIGATIONS   I understand that my provider will comply with any applicable law and may discuss my use and/or possible misuse/abuse of controlled substances and alcohol, as appropriate, with any health care provider involved in my care, pharmacist, or legal authority.  I authorize my provider and pharmacy to cooperate fully with law enforcement agencies (as permitted by law) in the investigation of any possible misuse, sale, or other diversion of my controlled substances.  I agree to waive any applicable privilege or right of privacy or confidentiality with respect to these authorizations. 7. PROVIDERS RIGHT TO MONITOR FOR SAFETY: PRESCRIPTION MONITORING / DRUG TESTING   I consent to drug/toxicology screening and will submit to a drug screen upon my providers request to assure I am only taking the prescribed drugs for my safety monitoring. I understand that a drug screen is a laboratory test in which a sample of my urine, blood or saliva is checked to see what drugs I have been taking. This may entail an observed urine specimen, which means that a nurse or other health care provider may watch me provide urine, and I will cooperate if I am asked to provide an observed specimen.  I understand that my provider will check a copy of my State Prescription Monitoring Program () Report in order to safely prescribe medications.  Pill Counts: I consent to pill counts when requested.   I may be asked to bring all my prescribed controlled substance medications, in their original bottles, to all of my scheduled appointments. In addition, my provider may ask me to come to the practice at any time for a random pill count. 8. TERMINATION OF THIS AGREEMENT  For my safety, my prescriber has the right to stop prescribing controlled substance medications and may end this agreement. Initials:_______   Conditions that may result in termination of this agreement:  a. I do not show any improvement in pain, or my activity has not improved. b. I develop rapid tolerance or loss of improvement, as described in my treatment plan.  c. I develop significant side effects from the medication. d. My behavior is not consistent with the responsibilities outlined above, thereby causing safety concerns to continue prescribing controlled substance medications. e. I fail to follow the terms of this agreement. f. Other:____________________________       UNDERSTANDING THIS MEDICATION AGREEMENT:    I have read the above and have had all my questions answered. For chronic disease management, I know that my symptoms can be managed with many types of treatments. A chronic medication trial may be part of my treatment, but I must be an active participant in my care. Medication therapy is only one part of my symptom management plan. In some cases, there may be limited scientific evidence to support the chronic use of certain medications to improve symptoms and daily function. Furthermore, in certain circumstances, there may be scientific information that suggests that the use of chronic controlled substances may worsen my symptoms and increase my risk of unintentional death directly related to this medication therapy. I know that if my provider feels my risk from controlled medications is greater than my benefit, I will have my controlled substance medication(s) compassionately lowered or removed altogether.      I further agree to allow this office to contact my HIPAA contact if there are concerns about my safety and use of the controlled medications. I have agreed to use the prescribed controlled substance medications to me as instructed by my provider and as stated in this Medication Agreement. My initial on each page and my signature below shows that I have read each page and I have had the opportunity to ask questions with answers provided by my provider.     Patient Name (Printed): _____________________________________  Patient Signature:  ______________________   Date: _____________    Prescriber Name (Printed): ___________________________________  Prescriber Signature: _____________________  Date: _____________

## 2021-09-30 NOTE — PROGRESS NOTES
Hives    Neurontin [Gabapentin]      No focus    Sulfa Antibiotics Hives and Rash       Review of Systems   Constitutional: Negative for chills and fever. HENT: Negative for facial swelling and nosebleeds. Eyes: Negative for photophobia and visual disturbance. Respiratory: Negative for apnea and choking. Cardiovascular: Negative for chest pain and palpitations. Gastrointestinal: Negative for abdominal distention and blood in stool. Genitourinary: Negative for enuresis and hematuria. Musculoskeletal: Positive for arthralgias, back pain, myalgias and stiffness. Negative for gait problem and joint swelling. Skin: Negative for rash. Neurological: Negative for syncope and speech difficulty. Hematological: Does not bruise/bleed easily. Psychiatric/Behavioral: Negative for hallucinations and suicidal ideas. Vitals:    09/30/21 0830   BP: 128/62   Pulse: 86   Resp: 18   Temp: 96.5 °F (35.8 °C)   SpO2: 98%   Weight: 160 lb (72.6 kg)   Height: 5' (1.524 m)       Physical Exam  Constitutional:       Appearance: She is well-developed. HENT:      Head: Normocephalic and atraumatic. Eyes:      Pupils: Pupils are equal, round, and reactive to light. Cardiovascular:      Rate and Rhythm: Normal rate and regular rhythm. Heart sounds: Normal heart sounds. Pulmonary:      Effort: No respiratory distress. Breath sounds: Normal breath sounds. No wheezing. Abdominal:      General: There is no distension. Musculoskeletal:      Left shoulder: Tenderness present. Decreased range of motion. Cervical back: Normal range of motion. Skin:     Coloration: Skin is not jaundiced. Neurological:      Mental Status: She is alert and oriented to person, place, and time.    Psychiatric:         Mood and Affect: Mood normal.        Assessment/Plan  Tom Nolasco was seen today for pre-op exam.    Diagnoses and all orders for this visit:    Chronic left shoulder pain    Pre-op exam    Medication

## 2021-12-02 ENCOUNTER — VIRTUAL VISIT (OUTPATIENT)
Dept: PRIMARY CARE CLINIC | Age: 61
End: 2021-12-02
Payer: MEDICARE

## 2021-12-02 DIAGNOSIS — R31.0 GROSS HEMATURIA: Primary | ICD-10-CM

## 2021-12-02 PROCEDURE — G8417 CALC BMI ABV UP PARAM F/U: HCPCS | Performed by: INTERNAL MEDICINE

## 2021-12-02 PROCEDURE — 1036F TOBACCO NON-USER: CPT | Performed by: INTERNAL MEDICINE

## 2021-12-02 PROCEDURE — G8484 FLU IMMUNIZE NO ADMIN: HCPCS | Performed by: INTERNAL MEDICINE

## 2021-12-02 PROCEDURE — G8427 DOCREV CUR MEDS BY ELIG CLIN: HCPCS | Performed by: INTERNAL MEDICINE

## 2021-12-02 PROCEDURE — 3017F COLORECTAL CA SCREEN DOC REV: CPT | Performed by: INTERNAL MEDICINE

## 2021-12-02 PROCEDURE — 99213 OFFICE O/P EST LOW 20 MIN: CPT | Performed by: INTERNAL MEDICINE

## 2021-12-02 RX ORDER — NITROFURANTOIN 25; 75 MG/1; MG/1
100 CAPSULE ORAL 2 TIMES DAILY
Qty: 20 CAPSULE | Refills: 0 | Status: SHIPPED | OUTPATIENT
Start: 2021-12-02 | End: 2021-12-12

## 2021-12-02 ASSESSMENT — ENCOUNTER SYMPTOMS
APNEA: 0
ABDOMINAL DISTENTION: 0
FACIAL SWELLING: 0
PHOTOPHOBIA: 0
BLOOD IN STOOL: 0

## 2021-12-02 NOTE — PROGRESS NOTES
Doxy  2021    TELEHEALTH EVALUATION -- Audio/Visual (During RAOSY-17 public health emergency)    Due to COVID 19 outbreak, patient's office visit was converted to a virtual visit. Patient was contacted and agreed to proceed with a virtual visit via [email or web address]  The risks and benefits of converting to a virtual visit were discussed in light of the current infectious disease epidemic. Patient also understood that insurance coverage and co-pays are up to their individual insurance plans. HPI:    Shruthi Sebastian (: 1960) has requested an audio/video evaluation for the following concern(s):    Urinary Tract Infection   This is a recurrent problem. The current episode started more than 1 month ago. The problem occurs intermittently. The problem has been waxing and waning. The quality of the pain is described as aching. The pain is at a severity of 1/10. Associated symptoms include hematuria. Associated symptoms comments: Hematuria on and off. Treatments tried: this time keflex helped. she had it for her arm. Review of Systems   Constitutional: Negative for fever. HENT: Negative for facial swelling and nosebleeds. Eyes: Negative for photophobia and visual disturbance. Respiratory: Negative for apnea. Cardiovascular: Negative for chest pain and palpitations. Gastrointestinal: Negative for abdominal distention and blood in stool. Genitourinary: Positive for hematuria. Negative for enuresis. Musculoskeletal: Negative for gait problem and joint swelling. Skin: Negative for rash. Neurological: Negative for syncope and speech difficulty. Hematological: Does not bruise/bleed easily. Psychiatric/Behavioral: Negative for hallucinations and suicidal ideas. Prior to Visit Medications    Medication Sig Taking?  Authorizing Provider   nitrofurantoin, macrocrystal-monohydrate, (MACROBID) 100 MG capsule Take 1 capsule by mouth 2 times daily for 10 days Yes Luis Garcia MD   blood glucose test strips (EXACTECH TEST) strip 1 each by In Vitro route daily As needed. Yes Oscar Shook MD   acarbose (PRECOSE) 25 MG tablet Take 1 tablet by mouth 3 times daily (with meals) Yes Oscar Shook MD   clonazePAM (KLONOPIN) 0.5 MG tablet TAKE ONE TABLET BY MOUTH AT BEDTIME AS TOLERATED Yes Historical Provider, MD   amitriptyline (ELAVIL) 25 MG tablet Take by mouth Yes Historical Provider, MD   tiZANidine (ZANAFLEX) 4 MG tablet  Yes Historical Provider, MD   clopidogrel (PLAVIX) 75 MG tablet Take 1 tablet by mouth daily Yes Historical Provider, MD   metoprolol tartrate (LOPRESSOR) 50 MG tablet Take 1 tablet by mouth 2 times daily Yes Historical Provider, MD   nitroGLYCERIN (NITROSTAT) 0.4 MG SL tablet Place 1 tablet under the tongue See Admin Instructions Yes Historical Provider, MD   topiramate (TOPAMAX) 100 MG tablet Take 1 tablet by mouth 2 times daily  Yes Historical Provider, MD   magnesium oxide (MAG-OX) 400 MG tablet Take 400 mg by mouth daily Yes Historical Provider, MD   amitriptyline (ELAVIL) 10 MG tablet Take 10 mg by mouth daily  Yes Historical Provider, MD   oxyCODONE-acetaminophen (PERCOCET) 5-325 MG per tablet Take 1 tablet by mouth every 6 hours as needed  . Yes Historical Provider, MD   atorvastatin (LIPITOR) 40 MG tablet Take 40 mg by mouth daily Yes Historical Provider, MD       Social History     Tobacco Use    Smoking status: Former Smoker     Packs/day: 0.05     Years: 20.00     Pack years: 1.00     Types: Cigarettes     Quit date: 11/3/2018     Years since quitting: 3.1    Smokeless tobacco: Never Used   Substance Use Topics    Alcohol use:  Yes     Alcohol/week: 0.0 standard drinks     Comment: social    Drug use: No        Allergies   Allergen Reactions    Dye [Iodides] Anaphylaxis    Avelox [Moxifloxacin Hcl In Nacl]     Lyrica [Pregabalin]      confusion    Metformin Hives    Neurontin [Gabapentin]      No focus    Sulfa Antibiotics Hives and Rash       PHYSICAL EXAMINATION:  [ INSTRUCTIONS:  \"[x]\" Indicates a positive item  \"[]\" Indicates a negative item  -- DELETE ALL ITEMS NOT EXAMINED]  [] Alert  [] Oriented to person/place/time    [] No apparent distress  [] Toxic appearing    [] Face flushed appearing [] Sclera clear  [] Lips are cyanotic      [] Breathing appears normal  [] Appears tachypneic      [] Rash on visible skin    [] Cranial Nerves II-XII grossly intact    [] Motor grossly intact in visible upper extremities    [] Motor grossly intact in visible lower extremities    [] Normal Mood  [] Anxious appearing    [] Depressed appearing  [] Confused appearing      [] Poor short term memory  [] Poor long term memory    [] OTHER:      Due to this being a TeleHealth encounter, evaluation of the following organ systems is limited: Vitals/Constitutional/EENT/Resp/CV/GI//MS/Neuro/Skin/Heme-Lymph-Imm. ASSESSMENT/PLAN:  1. Gross hematuria    - nitrofurantoin, macrocrystal-monohydrate, (MACROBID) 100 MG capsule; Take 1 capsule by mouth 2 times daily for 10 days  Dispense: 20 capsule; Refill: 0  - Urinalysis; Future  - Culture, Urine; Future  - US RETROPERITONEAL LIMITED; Future  - Sean Howell MD, Urology, [facility]      Return in about 6 months (around 6/2/2022), or if symptoms worsen or fail to improve. An  electronic signature was used to authenticate this note. --Sydnee Ba MD on 12/12/2021 at 5:24 PM        Pursuant to the emergency declaration under the [facility], [facility] waiver authority and the [employer] Act, this Virtual  Visit was conducted, with patient's consent, to reduce the patient's risk of exposure to COVID-19 and provide continuity of care for an established patient. Services were provided through a video synchronous discussion virtually to substitute for in-person clinic visit.

## 2021-12-20 ENCOUNTER — HOSPITAL ENCOUNTER (OUTPATIENT)
Dept: ULTRASOUND IMAGING | Age: 61
Discharge: HOME | End: 2021-12-22
Payer: MEDICARE

## 2021-12-20 ENCOUNTER — HOSPITAL ENCOUNTER (OUTPATIENT)
Dept: LAB | Age: 61
Discharge: HOME | End: 2021-12-20
Payer: MEDICARE

## 2021-12-20 DIAGNOSIS — R31.0 GROSS HEMATURIA: Primary | ICD-10-CM

## 2021-12-20 DIAGNOSIS — R31.0 GROSS HEMATURIA: ICD-10-CM

## 2021-12-20 DIAGNOSIS — N32.89 BLADDER MASS: Primary | ICD-10-CM

## 2021-12-20 LAB
BACTERIA: NEGATIVE /HPF
BILIRUBIN URINE: NEGATIVE
BLOOD, URINE: NEGATIVE
CLARITY: CLEAR
COLOR: YELLOW
EPITHELIAL CELLS, UA: NORMAL /HPF (ref 0–5)
GLUCOSE URINE: NEGATIVE MG/DL
HYALINE CASTS: NORMAL /HPF (ref 0–5)
KETONES, URINE: NEGATIVE MG/DL
LEUKOCYTE ESTERASE, URINE: ABNORMAL
NITRITE, URINE: NEGATIVE
PH UA: 5.5 (ref 5–9)
PROTEIN UA: NEGATIVE MG/DL
RBC UA: NORMAL /HPF (ref 0–5)
SPECIFIC GRAVITY UA: 1.01 (ref 1–1.03)
UROBILINOGEN, URINE: 0.2 E.U./DL
WBC UA: NORMAL /HPF (ref 0–5)

## 2021-12-20 PROCEDURE — 87086 URINE CULTURE/COLONY COUNT: CPT

## 2021-12-20 PROCEDURE — 81001 URINALYSIS AUTO W/SCOPE: CPT

## 2021-12-20 PROCEDURE — 76857 US EXAM PELVIC LIMITED: CPT

## 2021-12-22 LAB — URINE CULTURE, ROUTINE: NORMAL

## 2021-12-30 ENCOUNTER — HOSPITAL ENCOUNTER (OUTPATIENT)
Dept: CT IMAGING | Age: 61
Discharge: HOME | End: 2022-01-01
Payer: MEDICARE

## 2021-12-30 ENCOUNTER — OFFICE VISIT (OUTPATIENT)
Dept: UROLOGY | Age: 61
End: 2021-12-30
Payer: MEDICARE

## 2021-12-30 VITALS
SYSTOLIC BLOOD PRESSURE: 120 MMHG | HEART RATE: 92 BPM | WEIGHT: 150 LBS | BODY MASS INDEX: 29.45 KG/M2 | DIASTOLIC BLOOD PRESSURE: 68 MMHG | HEIGHT: 60 IN

## 2021-12-30 DIAGNOSIS — R31.0 GROSS HEMATURIA: Primary | ICD-10-CM

## 2021-12-30 DIAGNOSIS — R31.0 GROSS HEMATURIA: ICD-10-CM

## 2021-12-30 LAB
BILIRUBIN, POC: ABNORMAL
BLOOD URINE, POC: ABNORMAL
CLARITY, POC: CLEAR
COLOR, POC: YELLOW
GLUCOSE URINE, POC: ABNORMAL
KETONES, POC: ABNORMAL
LEUKOCYTE EST, POC: ABNORMAL
NITRITE, POC: ABNORMAL
PH, POC: 6
PROTEIN, POC: ABNORMAL
SPECIFIC GRAVITY, POC: 1.02
UROBILINOGEN, POC: 0.2

## 2021-12-30 PROCEDURE — 81003 URINALYSIS AUTO W/O SCOPE: CPT | Performed by: UROLOGY

## 2021-12-30 PROCEDURE — G8417 CALC BMI ABV UP PARAM F/U: HCPCS | Performed by: UROLOGY

## 2021-12-30 PROCEDURE — 99203 OFFICE O/P NEW LOW 30 MIN: CPT | Performed by: UROLOGY

## 2021-12-30 PROCEDURE — G8484 FLU IMMUNIZE NO ADMIN: HCPCS | Performed by: UROLOGY

## 2021-12-30 PROCEDURE — 74176 CT ABD & PELVIS W/O CONTRAST: CPT

## 2021-12-30 PROCEDURE — 1036F TOBACCO NON-USER: CPT | Performed by: UROLOGY

## 2021-12-30 PROCEDURE — 3017F COLORECTAL CA SCREEN DOC REV: CPT | Performed by: UROLOGY

## 2021-12-30 PROCEDURE — G8427 DOCREV CUR MEDS BY ELIG CLIN: HCPCS | Performed by: UROLOGY

## 2021-12-30 NOTE — PROGRESS NOTES
[facility] EVALUATION NOTE                                                 H&P                                                                                                                                                 Reason for Visit  Gross hematuria, back pain    History of Present Illness  61-year-old female with history of gross hematuria which has recently cleared  Negative urine culture  Ultrasound the bladder shows questionable filling defect  CT without contrast (contrast allergy) shows no evidence of hydronephrosis or renal calculi  Lesion noted on ultrasound cannot be visualized on CT      Urologic Review of Systems/Symptoms  Gross hematuria with spontaneously cleared    Review of Systems  Hospitalization: None recent  All 14 categories of Review of Systems otherwise reviewed no other findings reported.   History of chronic back pain  Past Medical History:   Diagnosis Date    Anemia     she sees hematology    Anxiety     Back pain with right-sided radiculopathy     Chronic left shoulder pain     Depression     Fibromyalgia     Hepatitis     Hyperlipidemia     Hypertension     Osteoarthritis     SHOULDER    Restless legs syndrome     Type II or unspecified type diabetes mellitus without mention of complication, not stated as uncontrolled      Past Surgical History:   Procedure Laterality Date    COLONOSCOPY      HYSTERECTOMY      MAMMO IMPLANT DIGITAL DIAG BI  03/02/2011    ROTATOR CUFF REPAIR Left 10/2021    TONSILLECTOMY AND ADENOIDECTOMY       Social History     Socioeconomic History    Marital status:      Spouse name: None    Number of children: None    Years of education: None    Highest education level: None   Occupational History    None   Tobacco Use    Smoking status: Former Smoker     Packs/day: 0.05     Years: 20.00     Pack years: 1.00     Types: Cigarettes     Quit date: 11/3/2018     Years since quitting: 3.1    Smokeless tobacco: Never Used Substance and Sexual Activity    Alcohol use: Yes     Alcohol/week: 0.0 standard drinks     Comment: social    Drug use: No    Sexual activity: None   Other Topics Concern    None   Social History Narrative    None     Social Determinants of Health     Financial Resource Strain: Low Risk     Difficulty of Paying Living Expenses: Not very hard   Food Insecurity: No Food Insecurity    Worried About Running Out of Food in the Last Year: Never true    Ba of Food in the Last Year: Never true   Transportation Needs:     Lack of Transportation (Medical): Not on file    Lack of Transportation (Non-Medical): Not on file   Physical Activity:     Days of Exercise per Week: Not on file    Minutes of Exercise per Session: Not on file   Stress:     Feeling of Stress : Not on file   Social Connections:     Frequency of Communication with Friends and Family: Not on file    Frequency of Social Gatherings with Friends and Family: Not on file    Attends Shinto Services: Not on file    Active Member of [employer] or Organizations: Not on file    Attends Club or Organization Meetings: Not on file    Marital Status: Not on file   Intimate Partner Violence:     Fear of Current or Ex-Partner: Not on file    Emotionally Abused: Not on file    Physically Abused: Not on file    Sexually Abused: Not on file   Housing Stability:     Unable to Pay for Housing in the Last Year: Not on file    Number of [place] in the Last Year: Not on file    Unstable Housing in the Last Year: Not on file     Family History   Problem Relation Age of Onset    Diabetes Mother     Diabetes Father     Heart Attack Father     Heart Disease Father     Diabetes Brother     Depression Daughter      Current Outpatient Medications   Medication Sig Dispense Refill    blood glucose test strips (EXACTECH TEST) strip 1 each by In Vitro route daily As needed.  100 each 5    acarbose (PRECOSE) 25 MG tablet Take 1 tablet by mouth 3 times daily (with meals) 90 tablet 3    clonazePAM (KLONOPIN) 0.5 MG tablet TAKE ONE TABLET BY MOUTH AT BEDTIME AS TOLERATED      tiZANidine (ZANAFLEX) 4 MG tablet       clopidogrel (PLAVIX) 75 MG tablet Take 1 tablet by mouth daily  3    metoprolol tartrate (LOPRESSOR) 50 MG tablet Take 1 tablet by mouth 2 times daily  5    nitroGLYCERIN (NITROSTAT) 0.4 MG SL tablet Place 1 tablet under the tongue See Admin Instructions  3    topiramate (TOPAMAX) 100 MG tablet Take 1 tablet by mouth 2 times daily   5    magnesium oxide (MAG-OX) 400 MG tablet Take 400 mg by mouth daily      amitriptyline (ELAVIL) 10 MG tablet Take 10 mg by mouth daily       oxyCODONE-acetaminophen (PERCOCET) 5-325 MG per tablet Take 1 tablet by mouth every 6 hours as needed  .  atorvastatin (LIPITOR) 40 MG tablet Take 40 mg by mouth daily       No current facility-administered medications for this visit. Dye [iodides], Avelox [moxifloxacin hcl in nacl], Lyrica [pregabalin], Metformin, Neurontin [gabapentin], and Sulfa antibiotics  All reviewed and verified by Dr Gopal Bhatt on today's visit    No results found for: PSA, PSADIA  Results for POC orders placed in visit on 12/30/21   POCT Urinalysis No Micro (Auto)   Result Value Ref Range    Color, UA yellow     Clarity, UA clear     Glucose, UA POC neg     Bilirubin, UA neg     Ketones, UA neg     Spec Grav, UA 1.020     Blood, UA POC trace     pH, UA 6.0     Protein, UA POC neg     Urobilinogen, UA 0.2     Leukocytes, UA neg     Nitrite, UA neg        Physical Exam  Vitals:    12/30/21 1333   BP: 120/68   Pulse: 92   Weight: 150 lb (68 kg)   Height: 5' (1.524 m)     Constitutional: Patient in mild distress complaining of back pain. Cardiovascular: Normal rate, BP reviewed. Unremarkable  Pulmonary/Chest: Normal respiratory effort not short of breath  Abdominal: Not distended.    Urologic Exam  CT reviewed with patient  Ultrasound reviewed  Physical exam otherwise noncontributory. Assessment/Medical Necessity-Decision Making  Gross hematuria which is cleared spontaneously  Negative urine culture  Questionable polypoid lesion on ultrasound of bladder  Lesion not visualized on CT (noncontrast study due to severe contrast allergy)  Plan  Office cystoscopy  Instructions given  Greater than 50% of 30 minutes spent consulting patient face-to-face  Orders Placed This Encounter   Procedures    POCT Urinalysis No Micro (Auto)     No orders of the defined types were placed in this encounter. Omar Ahmadi MD       Please note this report has been partially produced using speech recognition software  And may cause contain errors related to that system including grammar, punctuation and spelling as well as words and phrases that may seem inappropriate. If there are questions or concerns please feel free to contact me to clarify.

## 2022-01-04 ENCOUNTER — PROCEDURE VISIT (OUTPATIENT)
Dept: UROLOGY | Age: 62
End: 2022-01-04
Payer: MEDICARE

## 2022-01-04 VITALS
HEIGHT: 60 IN | HEART RATE: 64 BPM | OXYGEN SATURATION: 99 % | BODY MASS INDEX: 29.45 KG/M2 | WEIGHT: 150 LBS | SYSTOLIC BLOOD PRESSURE: 110 MMHG | DIASTOLIC BLOOD PRESSURE: 60 MMHG

## 2022-01-04 DIAGNOSIS — R31.0 GROSS HEMATURIA: Primary | ICD-10-CM

## 2022-01-04 DIAGNOSIS — R31.0 GROSS HEMATURIA: ICD-10-CM

## 2022-01-04 LAB
BILIRUBIN, POC: ABNORMAL
BLOOD URINE, POC: ABNORMAL
CLARITY, POC: CLEAR
COLOR, POC: YELLOW
GLUCOSE URINE, POC: ABNORMAL
HCT VFR BLD CALC: 22.5 % (ref 37–47)
HEMOGLOBIN: 7.2 G/DL (ref 12–16)
KETONES, POC: ABNORMAL
LEUKOCYTE EST, POC: ABNORMAL
MCH RBC QN AUTO: 28.8 PG (ref 27–31.3)
MCHC RBC AUTO-ENTMCNC: 32 % (ref 33–37)
MCV RBC AUTO: 90.3 FL (ref 82–100)
NITRITE, POC: ABNORMAL
PDW BLD-RTO: 16 % (ref 11.5–14.5)
PH, POC: 6
PLATELET # BLD: 273 K/UL (ref 130–400)
PROTEIN, POC: ABNORMAL
RBC # BLD: 2.5 M/UL (ref 4.2–5.4)
SPECIFIC GRAVITY, POC: 1.02
UROBILINOGEN, POC: 0.2
WBC # BLD: 12.2 K/UL (ref 4.8–10.8)

## 2022-01-04 PROCEDURE — 81003 URINALYSIS AUTO W/O SCOPE: CPT | Performed by: UROLOGY

## 2022-01-04 PROCEDURE — 52000 CYSTOURETHROSCOPY: CPT | Performed by: UROLOGY

## 2022-01-04 RX ORDER — CEPHALEXIN 500 MG/1
500 CAPSULE ORAL ONCE
Qty: 1 CAPSULE | Refills: 0 | COMMUNITY
Start: 2022-01-04 | End: 2022-01-04

## 2022-01-04 NOTE — PROGRESS NOTES
[facility] EVALUATION NOTE                                                 H&P                                                                                                                                                 Reason for Visit  Gross hematuria    History of Present Illness  Patient here for cystoscopy      Urologic Review of Systems/Symptoms  No issues recently    Review of Systems  Hospitalization: None recent  All 14 categories of Review of Systems otherwise reviewed no other findings reported. History of bladder polyp  Past Medical History:   Diagnosis Date    Anemia     she sees hematology    Anxiety     Back pain with right-sided radiculopathy     Chronic left shoulder pain     Depression     Fibromyalgia     Hepatitis     Hyperlipidemia     Hypertension     Osteoarthritis     SHOULDER    Restless legs syndrome     Type II or unspecified type diabetes mellitus without mention of complication, not stated as uncontrolled      Past Surgical History:   Procedure Laterality Date    COLONOSCOPY      HYSTERECTOMY      MAMMO IMPLANT DIGITAL DIAG BI  03/02/2011    ROTATOR CUFF REPAIR Left 10/2021    TONSILLECTOMY AND ADENOIDECTOMY       Social History     Socioeconomic History    Marital status:      Spouse name: None    Number of children: None    Years of education: None    Highest education level: None   Occupational History    None   Tobacco Use    Smoking status: Former Smoker     Packs/day: 0.05     Years: 20.00     Pack years: 1.00     Types: Cigarettes     Quit date: 11/3/2018     Years since quitting: 3.1    Smokeless tobacco: Never Used   Substance and Sexual Activity    Alcohol use:  Yes     Alcohol/week: 0.0 standard drinks     Comment: social    Drug use: No    Sexual activity: None   Other Topics Concern    None   Social History Narrative    None     Social Determinants of Health     Financial Resource Strain: Low Risk     Difficulty of Paying Living Expenses: Not very hard   Food Insecurity: No Food Insecurity    Worried About Running Out of Food in the Last Year: Never true    Ran Out of Food in the Last Year: Never true   Transportation Needs:     Lack of Transportation (Medical): Not on file    Lack of Transportation (Non-Medical): Not on file   Physical Activity:     Days of Exercise per Week: Not on file    Minutes of Exercise per Session: Not on file   Stress:     Feeling of Stress : Not on file   Social Connections:     Frequency of Communication with Friends and Family: Not on file    Frequency of Social Gatherings with Friends and Family: Not on file    Attends Nondenominational Services: Not on file    Active Member of [address] [place] [address] [employer] or Organizations: Not on file    Attends Club or Organization Meetings: Not on file    Marital Status: Not on file   Intimate Partner Violence:     Fear of Current or Ex-Partner: Not on file    Emotionally Abused: Not on file    Physically Abused: Not on file    Sexually Abused: Not on file   Housing Stability:     Unable to Pay for Housing in the Last Year: Not on file    Number of [place] in the Last Year: Not on file    Unstable Housing in the Last Year: Not on file     Family History   Problem Relation Age of Onset    Diabetes Mother     Diabetes Father     Heart Attack Father     Heart Disease Father     Diabetes Brother     Depression Daughter      Current Outpatient Medications   Medication Sig Dispense Refill    cephALEXin (KEFLEX) 500 MG capsule Take 1 capsule by mouth once for 1 dose 1 capsule 0    blood glucose test strips (EXACTECH TEST) strip 1 each by In Vitro route daily As needed.  100 each 5    acarbose (PRECOSE) 25 MG tablet Take 1 tablet by mouth 3 times daily (with meals) 90 tablet 3    clonazePAM (KLONOPIN) 0.5 MG tablet TAKE ONE TABLET BY MOUTH AT BEDTIME AS TOLERATED      tiZANidine (ZANAFLEX) 4 MG tablet       clopidogrel (PLAVIX) 75 MG tablet Take 1 tablet by mouth daily 3    metoprolol tartrate (LOPRESSOR) 50 MG tablet Take 1 tablet by mouth 2 times daily  5    nitroGLYCERIN (NITROSTAT) 0.4 MG SL tablet Place 1 tablet under the tongue See Admin Instructions  3    topiramate (TOPAMAX) 100 MG tablet Take 1 tablet by mouth 2 times daily   5    magnesium oxide (MAG-OX) 400 MG tablet Take 400 mg by mouth daily      amitriptyline (ELAVIL) 10 MG tablet Take 10 mg by mouth daily       oxyCODONE-acetaminophen (PERCOCET) 5-325 MG per tablet Take 1 tablet by mouth every 6 hours as needed  .  atorvastatin (LIPITOR) 40 MG tablet Take 40 mg by mouth daily       No current facility-administered medications for this visit. Dye [iodides], Avelox [moxifloxacin hcl in nacl], Lyrica [pregabalin], Metformin, Neurontin [gabapentin], and Sulfa antibiotics  All reviewed and verified by Dr Deon Lynn on today's visit    No results found for: PSA, PSADIA  Results for POC orders placed in visit on 01/04/22   POCT Urinalysis No Micro (Auto)   Result Value Ref Range    Color, UA yellow     Clarity, UA clear     Glucose, UA POC neg     Bilirubin, UA neg     Ketones, UA neg     Spec Grav, UA 1.025     Blood, UA POC moderate     pH, UA 6.0     Protein, UA POC 30 mg     Urobilinogen, UA 0.2     Leukocytes, UA small     Nitrite, UA neg        Physical Exam  Vitals:    01/04/22 0853   BP: 110/60   Pulse: 64   SpO2: 99%   Weight: 150 lb (68 kg)   Height: 5' (1.524 m)     Constitutional: Not in distress. Cardiovascular: Normal rate, BP reviewed. Normal  Pulmonary/Chest: Normal respiratory effort not short of breath  Abdominal: Not distended. Normal  Urologic Exam  Vaginal exam within normal limits. Musculoskeletal: Ambulatory.   Extremities: No edema  Neurological: No deficits  Lymphatics: No lymphadenopathy  Psychiatric: Alert and oriented x3      Cystoscopy report  Flexible cystoscopy/local anesthetic/premedicated with antibiotic (Keflex)  Normal urethra  Large polypoid lesion involving the right trigone  Right ureteral orifice not visualized  Left ureter within normal limits  Solitary lesion involving the right side of the bladder  Blossom Pagan MD    Assessment/Medical Necessity-Decision Making  Polypoid lesion involving the right side of bladder questionable involvement of right ureter  Patient has anaphylaxis with x-ray dye should be premedicated with steroids 300 mg preoperatively along with Benadryl for retrograde pyelogram on the right potential retrograde pyelogram on the left  TURBT under general endotracheal anesthesia with possible bilateral J stents  Patient will get a cardiology clearance from Dr. Alexis Lam regarding stopping her Plavix  Plan  As above  Greater than 50% of 30 minutes spent consulting patient face-to-face  Orders Placed This Encounter   Procedures    CBC     Standing Status:   Future     Number of Occurrences:   1     Standing Expiration Date:   1/4/2023    POCT Urinalysis No Micro (Auto)     Orders Placed This Encounter   Medications    cephALEXin (KEFLEX) 500 MG capsule     Sig: Take 1 capsule by mouth once for 1 dose     Dispense:  1 capsule     Refill:  0     Blossom Pagan MD       Please note this report has been partially produced using speech recognition software  And may cause contain errors related to that system including grammar, punctuation and spelling as well as words and phrases that may seem inappropriate. If there are questions or concerns please feel free to contact me to clarify.

## 2022-01-05 ENCOUNTER — TELEPHONE (OUTPATIENT)
Dept: UROLOGY | Age: 62
End: 2022-01-05

## 2022-01-06 ENCOUNTER — TELEPHONE (OUTPATIENT)
Dept: FAMILY MEDICINE CLINIC | Age: 62
End: 2022-01-06

## 2022-01-06 ENCOUNTER — TELEPHONE (OUTPATIENT)
Dept: UROLOGY | Age: 62
End: 2022-01-06

## 2022-01-06 NOTE — TELEPHONE ENCOUNTER
Khanh clearmadiha from [facility] for  that its ok for patient to stop the Plavix for 7 days before surgery with  1-12-22

## 2022-01-06 NOTE — TELEPHONE ENCOUNTER
----- Message from Francesco Jorge sent at 1/4/2022  3:50 PM EST -----  Subject: Appointment Request    Reason for Call: Routine Pre-Op    QUESTIONS  Type of Appointment? Established Patient  Reason for appointment request? No appointments available during search  Additional Information for Provider? Patient is needing to reschedule pre   op because for the next 2 day for a blood transfusion. She would like to   come in on Friday for the pre op will be great. Please let patient know if   this is possible. Thanks  ---------------------------------------------------------------------------  --------------  Brian LUJAN  What is the best way for the office to contact you? OK to leave message on   voicemail  Preferred Call Back Phone Number? [phone]  ---------------------------------------------------------------------------  --------------  SCRIPT ANSWERS  Relationship to Patient? Self  Have your symptoms changed? No  Do you have questions for your provider that need to be answered prior to   scheduling your pre-op appointment? No  Have you been diagnosed with, awaiting test results for, or told that you   are suspected of having COVID-19 (Coronavirus)? (If patient has tested   negative or was tested as a requirement for work, school, or travel and   not based on symptoms, answer no)? No  Within the past two weeks have you developed any of the following symptoms   (answer no if symptoms have been present longer than 2 weeks or began   more than 2 weeks ago)? Fever or Chills, Cough, Shortness of breath or   difficulty breathing, Loss of taste or smell, Sore throat, Nasal   congestion, Sneezing or runny nose, Fatigue or generalized body aches   (answer no if pain is specific to a body part e.g. back pain), Diarrhea,   Headache? No  Have you had close contact with someone with COVID-19 in the last 14 days? No  (Service Expert  click yes below to proceed with [employer] As Usual   Scheduling)?  Yes

## 2022-01-07 ENCOUNTER — OFFICE VISIT (OUTPATIENT)
Dept: PRIMARY CARE CLINIC | Age: 62
End: 2022-01-07
Payer: MEDICARE

## 2022-01-07 VITALS
WEIGHT: 152 LBS | RESPIRATION RATE: 18 BRPM | DIASTOLIC BLOOD PRESSURE: 58 MMHG | HEART RATE: 81 BPM | TEMPERATURE: 97.5 F | HEIGHT: 60 IN | OXYGEN SATURATION: 98 % | BODY MASS INDEX: 29.84 KG/M2 | SYSTOLIC BLOOD PRESSURE: 118 MMHG

## 2022-01-07 DIAGNOSIS — D59.8 OTHER ACQUIRED HEMOLYTIC ANEMIAS (HCC): ICD-10-CM

## 2022-01-07 DIAGNOSIS — Z01.818 PRE-OPERATIVE CLEARANCE: Primary | ICD-10-CM

## 2022-01-07 DIAGNOSIS — E11.9 TYPE 2 DIABETES MELLITUS WITHOUT COMPLICATION, WITHOUT LONG-TERM CURRENT USE OF INSULIN (HCC): ICD-10-CM

## 2022-01-07 LAB
ALBUMIN: 4 G/DL (ref 3.4–5)
ALP BLD-CCNC: 142 U/L (ref 33–136)
ALT SERPL-CCNC: 78 U/L (ref 7–45)
ANION GAP SERPL CALCULATED.3IONS-SCNC: 12 MMOL/L (ref 10–20)
AST SERPL-CCNC: 71 U/L (ref 9–39)
BASOPHILS # BLD: 0.04 X10E9/L (ref 0–0.1)
BASOPHILS RELATIVE PERCENT: 0.5 % (ref 0–2)
BICARBONATE: 28 MMOL/L (ref 21–32)
BILIRUB SERPL-MCNC: 0.5 MG/DL (ref 0–1.2)
CALCIUM SERPL-MCNC: 9.5 MG/DL (ref 8.6–10.3)
CHLORIDE BLD-SCNC: 104 MMOL/L (ref 98–107)
CREAT SERPL-MCNC: 0.59 MG/DL (ref 0.5–1)
EGFR FEMALE: >90 ML/MIN/1.73M2
EOSINOPHIL # BLD: 0.08 X10E9/L (ref 0–0.7)
EOSINOPHILS RELATIVE PERCENT: 0.9 % (ref 0–6)
ERYTHROCYTE [DISTWIDTH] IN BLOOD BY AUTOMATED COUNT: 16.3 % (ref 11.5–14)
GLUCOSE: 106 MG/DL (ref 74–99)
HCT VFR BLD CALC: 32.6 % (ref 36–46)
HEMOGLOBIN: 9.8 G/DL (ref 12–16)
IMMATURE GRANULOCYTES %: 0.4 % (ref 0–0.9)
LYMPHOCYTES # BLD: 16.8 % (ref 13–44)
LYMPHOCYTES RELATIVE PERCENT: 1.44 X10E9/L (ref 1.2–4.8)
MCHC RBC AUTO-ENTMCNC: 30.1 G/DL (ref 32–36)
MCV RBC AUTO: 100 FL (ref 80–100)
MONOCYTES # BLD: 0.79 X10E9/L (ref 0.1–1)
MONOCYTES RELATIVE PERCENT: 9.2 % (ref 2–10)
NEUTROPHILS RELATIVE PERCENT: 72.2 % (ref 40–80)
NEUTROPHILS: 6.17 X10E9/L (ref 1.2–7.7)
PLATELET # BLD: 269 X10E9/L (ref 150–450)
POTASSIUM SERPL-SCNC: 3.7 MMOL/L (ref 3.5–5.3)
RBC # BLD: 3.25 X10E12/L (ref 4–5.2)
SODIUM BLD-SCNC: 140 MMOL/L (ref 136–145)
TOTAL PROTEIN: 6.8 G/DL (ref 6.4–8.2)
UREA NITROGEN: 10 MG/DL (ref 6–23)
WBC: 8.6 X10E9/L (ref 4.4–11.3)

## 2022-01-07 PROCEDURE — G8417 CALC BMI ABV UP PARAM F/U: HCPCS | Performed by: INTERNAL MEDICINE

## 2022-01-07 PROCEDURE — 3017F COLORECTAL CA SCREEN DOC REV: CPT | Performed by: INTERNAL MEDICINE

## 2022-01-07 PROCEDURE — 2022F DILAT RTA XM EVC RTNOPTHY: CPT | Performed by: INTERNAL MEDICINE

## 2022-01-07 PROCEDURE — G8484 FLU IMMUNIZE NO ADMIN: HCPCS | Performed by: INTERNAL MEDICINE

## 2022-01-07 PROCEDURE — 3046F HEMOGLOBIN A1C LEVEL >9.0%: CPT | Performed by: INTERNAL MEDICINE

## 2022-01-07 PROCEDURE — 1036F TOBACCO NON-USER: CPT | Performed by: INTERNAL MEDICINE

## 2022-01-07 PROCEDURE — 99214 OFFICE O/P EST MOD 30 MIN: CPT | Performed by: INTERNAL MEDICINE

## 2022-01-07 PROCEDURE — G8427 DOCREV CUR MEDS BY ELIG CLIN: HCPCS | Performed by: INTERNAL MEDICINE

## 2022-01-07 PROCEDURE — 93000 ELECTROCARDIOGRAM COMPLETE: CPT | Performed by: INTERNAL MEDICINE

## 2022-01-07 ASSESSMENT — ENCOUNTER SYMPTOMS
COUGH: 0
DIARRHEA: 0
NAUSEA: 0
WHEEZING: 0
SHORTNESS OF BREATH: 0
ABDOMINAL PAIN: 0
VOMITING: 0

## 2022-01-07 NOTE — PROGRESS NOTES
Subjective:      Patient ID: Mitra Rees is a 64 y.o. female    Preop examination  HPI  Pt presents for preop clearance. Scheduled for cystoscopy with Dr. Jer Soto    Hx: gross hematuria with bladder mass. Recent 2 units PRBC transfusion. No chest pain, no SOB, no palpitations or leg swelling. Surgery is intermediate risk. METS score:4-10/10  Hx CAD s/p stent, hx DM, not on insulin. No stroke, no CHF, no CKD or CAD. RCRI score: 1  Past Medical History:   Diagnosis Date    Anemia     she sees hematology    Anxiety     Back pain with right-sided radiculopathy     Chronic left shoulder pain     Depression     Fibromyalgia     Hepatitis     Hyperlipidemia     Hypertension     Osteoarthritis     SHOULDER    Restless legs syndrome     Type II or unspecified type diabetes mellitus without mention of complication, not stated as uncontrolled      Past Surgical History:   Procedure Laterality Date    COLONOSCOPY      HYSTERECTOMY      MAMMO IMPLANT DIGITAL DIAG BI  03/02/2011    ROTATOR CUFF REPAIR Left 10/2021    TONSILLECTOMY AND ADENOIDECTOMY       Social History     Socioeconomic History    Marital status:      Spouse name: Not on file    Number of children: Not on file    Years of education: Not on file    Highest education level: Not on file   Occupational History    Not on file   Tobacco Use    Smoking status: Former Smoker     Packs/day: 0.05     Years: 20.00     Pack years: 1.00     Types: Cigarettes     Quit date: 11/3/2018     Years since quitting: 3.1    Smokeless tobacco: Never Used   Substance and Sexual Activity    Alcohol use:  Yes     Alcohol/week: 0.0 standard drinks     Comment: social    Drug use: No    Sexual activity: Not on file   Other Topics Concern    Not on file   Social History Narrative    Not on file     Social Determinants of Health     Financial Resource Strain: Low Risk     Difficulty of Paying Living Expenses: Not very hard   Food Insecurity: No Food Insecurity    Worried About Running Out of Food in the Last Year: Never true    Ran Out of Food in the Last Year: Never true   Transportation Needs:     Lack of Transportation (Medical): Not on file    Lack of Transportation (Non-Medical):  Not on file   Physical Activity:     Days of Exercise per Week: Not on file    Minutes of Exercise per Session: Not on file   Stress:     Feeling of Stress : Not on file   Social Connections:     Frequency of Communication with Friends and Family: Not on file    Frequency of Social Gatherings with Friends and Family: Not on file    Attends [employer] Services: Not on file    Active Member of [address] [place] [address] [employer] or Organizations: Not on file    Attends Club or Organization Meetings: Not on file    Marital Status: Not on file   Intimate Partner Violence:     Fear of Current or Ex-Partner: Not on file    Emotionally Abused: Not on file    Physically Abused: Not on file    Sexually Abused: Not on file   Housing Stability:     Unable to Pay for Housing in the Last Year: Not on file    Number of [place] in the Last Year: Not on file    Unstable Housing in the Last Year: Not on file     Family History   Problem Relation Age of Onset    Diabetes Mother     Diabetes Father     Heart Attack Father     Heart Disease Father     Diabetes Brother     Depression Daughter      Allergies:  Dye [iodides], Avelox [moxifloxacin hcl in nacl], Lyrica [pregabalin], Metformin, Neurontin [gabapentin], and Sulfa antibiotics  Patient Active Problem List   Diagnosis    Type 2 diabetes mellitus without complication, without long-term current use of insulin ([facility] [address]    Depressive disorder, not elsewhere classified    Essential hypertension, benign    Low back pain    DDD (degenerative disc disease), lumbar    Ulnar neuropathy    Iron deficiency anemia due to dietary causes    Other acquired hemolytic anemias ([address]    Malabsorption of iron     Current Outpatient Medications on File Prior to Visit   Medication Sig Dispense Refill    acarbose (PRECOSE) 25 MG tablet Take 1 tablet by mouth 3 times daily (with meals) 90 tablet 3    clonazePAM (KLONOPIN) 0.5 MG tablet TAKE ONE TABLET BY MOUTH AT BEDTIME AS TOLERATED      tiZANidine (ZANAFLEX) 4 MG tablet       clopidogrel (PLAVIX) 75 MG tablet Take 1 tablet by mouth daily  3    metoprolol tartrate (LOPRESSOR) 50 MG tablet Take 1 tablet by mouth 2 times daily  5    nitroGLYCERIN (NITROSTAT) 0.4 MG SL tablet Place 1 tablet under the tongue See Admin Instructions  3    topiramate (TOPAMAX) 100 MG tablet Take 1 tablet by mouth 2 times daily   5    magnesium oxide (MAG-OX) 400 MG tablet Take 400 mg by mouth daily      amitriptyline (ELAVIL) 10 MG tablet Take 10 mg by mouth daily       oxyCODONE-acetaminophen (PERCOCET) 5-325 MG per tablet Take 1 tablet by mouth every 6 hours as needed  .  atorvastatin (LIPITOR) 40 MG tablet Take 40 mg by mouth daily      blood glucose test strips (EXACTECH TEST) strip 1 each by In Vitro route daily As needed. 100 each 5     No current facility-administered medications on file prior to visit. Review of Systems   Constitutional: Negative for chills, diaphoresis, fatigue and fever. HENT: Negative for congestion. Respiratory: Negative for cough, shortness of breath and wheezing. Cardiovascular: Negative for chest pain. Gastrointestinal: Negative for abdominal pain, diarrhea, nausea and vomiting. Endocrine: Negative for cold intolerance and heat intolerance. Genitourinary: Negative for dysuria and frequency. Neurological: Negative for dizziness and light-headedness. Objective:   BP (!) 118/58   Pulse 81   Temp 97.5 °F (36.4 °C)   Resp 18   Ht 5' (1.524 m)   Wt 152 lb (68.9 kg)   SpO2 98%   BMI 29.69 kg/m²     Physical Exam  Constitutional:       General: She is not in acute distress. Appearance: She is not diaphoretic.    Cardiovascular:      Rate and Rhythm: Normal rate and regular rhythm. Pulses: Normal pulses. Heart sounds: Normal heart sounds, S1 normal and S2 normal.   Pulmonary:      Effort: Pulmonary effort is normal. No respiratory distress. Breath sounds: Normal breath sounds. No wheezing or rales. Chest:      Chest wall: No tenderness. Abdominal:      General: Bowel sounds are normal.      Tenderness: There is no abdominal tenderness. Neurological:      Mental Status: She is alert. Assessment:       Diagnosis Orders   1. Pre-operative clearance  EKG 12 lead    CBC With Auto Differential    Comprehensive Metabolic Panel    EKG 12 lead   2. Type 2 diabetes mellitus without complication, without long-term current use of insulin (HCC) Controlled    3. Other acquired hemolytic anemias ([facility] [address]      follows with hematologist       Plan:      Orders Placed This Encounter   Procedures    CBC With Auto Differential     Standing Status:   Future     Standing Expiration Date:   1/7/2023    Comprehensive Metabolic Panel     Standing Status:   Future     Standing Expiration Date:   1/7/2023    EKG 12 lead     Standing Status:   Future     Number of Occurrences:   1     Standing Expiration Date:   3/8/2022     Order Specific Question:   Reason for Exam?     Answer:   Pre-op     No orders of the defined types were placed in this encounter. Based on RCRI score, perioperative risk is <1%. Patient is low risk for cardiovascular event. There is no medical contraindication to surgery, granted anemia is improved. I recommend routine DVT prophylaxis prn post-op. Hold Plavix 5 days before surgery. Return for follow up, post op.

## 2022-01-10 ENCOUNTER — TELEPHONE (OUTPATIENT)
Dept: SURGERY | Age: 62
End: 2022-01-10

## 2022-01-10 NOTE — TELEPHONE ENCOUNTER
Patient wanted to let you know Dr. Chloé Brice said she is perfectly fine for surgery (she had her CBC-hemoglobin 9.8)  On another note, surgery scheduling has not called her regarding taking a Covid test prior.  Please call to advise

## 2022-01-12 ENCOUNTER — ANESTHESIA EVENT (OUTPATIENT)
Dept: OPERATING ROOM | Age: 62
End: 2022-01-12
Payer: MEDICARE

## 2022-01-12 ENCOUNTER — HOSPITAL ENCOUNTER (OUTPATIENT)
Dept: GENERAL RADIOLOGY | Age: 62
Setting detail: OUTPATIENT SURGERY
Discharge: HOME | End: 2022-01-14
Attending: UROLOGY
Payer: MEDICARE

## 2022-01-12 ENCOUNTER — ANESTHESIA (OUTPATIENT)
Dept: OPERATING ROOM | Age: 62
End: 2022-01-12
Payer: MEDICARE

## 2022-01-12 ENCOUNTER — HOSPITAL ENCOUNTER (OUTPATIENT)
Age: 62
Setting detail: OUTPATIENT SURGERY
Discharge: HOME | End: 2022-01-12
Attending: UROLOGY | Admitting: UROLOGY
Payer: MEDICARE

## 2022-01-12 VITALS — SYSTOLIC BLOOD PRESSURE: 120 MMHG | DIASTOLIC BLOOD PRESSURE: 59 MMHG | OXYGEN SATURATION: 100 %

## 2022-01-12 VITALS
OXYGEN SATURATION: 99 % | WEIGHT: 150 LBS | HEART RATE: 98 BPM | DIASTOLIC BLOOD PRESSURE: 65 MMHG | SYSTOLIC BLOOD PRESSURE: 146 MMHG | RESPIRATION RATE: 20 BRPM | HEIGHT: 60 IN | TEMPERATURE: 97.1 F | BODY MASS INDEX: 29.45 KG/M2

## 2022-01-12 DIAGNOSIS — R52 PAIN: ICD-10-CM

## 2022-01-12 LAB
ANION GAP SERPL CALCULATED.3IONS-SCNC: 10 MEQ/L (ref 9–15)
BUN BLDV-MCNC: 12 MG/DL (ref 8–23)
CALCIUM SERPL-MCNC: 9.2 MG/DL (ref 8.5–9.9)
CHLORIDE BLD-SCNC: 99 MEQ/L (ref 95–107)
CO2: 27 MEQ/L (ref 20–31)
CREAT SERPL-MCNC: 0.61 MG/DL (ref 0.5–0.9)
GFR AFRICAN AMERICAN: >60
GFR NON-AFRICAN AMERICAN: >60
GLUCOSE BLD-MCNC: 133 MG/DL (ref 70–99)
GLUCOSE BLD-MCNC: 142 MG/DL (ref 60–115)
GLUCOSE BLD-MCNC: 152 MG/DL (ref 60–115)
PERFORMED ON: ABNORMAL
PERFORMED ON: ABNORMAL
POTASSIUM SERPL-SCNC: 3 MEQ/L (ref 3.4–4.9)
SARS-COV-2, NAAT: NOT DETECTED
SODIUM BLD-SCNC: 136 MEQ/L (ref 135–144)

## 2022-01-12 PROCEDURE — 2500000003 HC RX 250 WO HCPCS

## 2022-01-12 PROCEDURE — 87635 SARS-COV-2 COVID-19 AMP PRB: CPT

## 2022-01-12 PROCEDURE — 2580000003 HC RX 258: Performed by: UROLOGY

## 2022-01-12 PROCEDURE — 88305 TISSUE EXAM BY PATHOLOGIST: CPT

## 2022-01-12 PROCEDURE — 3700000000 HC ANESTHESIA ATTENDED CARE: Performed by: UROLOGY

## 2022-01-12 PROCEDURE — 76000 FLUOROSCOPY <1 HR PHYS/QHP: CPT

## 2022-01-12 PROCEDURE — 52332 CYSTOSCOPY AND TREATMENT: CPT | Performed by: UROLOGY

## 2022-01-12 PROCEDURE — 74420 UROGRAPHY RTRGR +-KUB: CPT | Performed by: UROLOGY

## 2022-01-12 PROCEDURE — 6370000000 HC RX 637 (ALT 250 FOR IP): Performed by: UROLOGY

## 2022-01-12 PROCEDURE — 88307 TISSUE EXAM BY PATHOLOGIST: CPT

## 2022-01-12 PROCEDURE — 6360000002 HC RX W HCPCS: Performed by: UROLOGY

## 2022-01-12 PROCEDURE — 7100000000 HC PACU RECOVERY - FIRST 15 MIN: Performed by: UROLOGY

## 2022-01-12 PROCEDURE — 6360000002 HC RX W HCPCS: Performed by: REGISTERED NURSE

## 2022-01-12 PROCEDURE — 2500000003 HC RX 250 WO HCPCS: Performed by: REGISTERED NURSE

## 2022-01-12 PROCEDURE — 3600000004 HC SURGERY LEVEL 4 BASE: Performed by: UROLOGY

## 2022-01-12 PROCEDURE — 80048 BASIC METABOLIC PNL TOTAL CA: CPT

## 2022-01-12 PROCEDURE — 6370000000 HC RX 637 (ALT 250 FOR IP): Performed by: ANESTHESIOLOGY

## 2022-01-12 PROCEDURE — 6360000002 HC RX W HCPCS

## 2022-01-12 PROCEDURE — 3700000001 HC ADD 15 MINUTES (ANESTHESIA): Performed by: UROLOGY

## 2022-01-12 PROCEDURE — 7100000010 HC PHASE II RECOVERY - FIRST 15 MIN: Performed by: UROLOGY

## 2022-01-12 PROCEDURE — 2580000003 HC RX 258: Performed by: REGISTERED NURSE

## 2022-01-12 PROCEDURE — 7100000001 HC PACU RECOVERY - ADDTL 15 MIN: Performed by: UROLOGY

## 2022-01-12 PROCEDURE — 7100000011 HC PHASE II RECOVERY - ADDTL 15 MIN: Performed by: UROLOGY

## 2022-01-12 PROCEDURE — 6360000002 HC RX W HCPCS: Performed by: ANESTHESIOLOGY

## 2022-01-12 PROCEDURE — 3600000014 HC SURGERY LEVEL 4 ADDTL 15MIN: Performed by: UROLOGY

## 2022-01-12 PROCEDURE — C1758 CATHETER, URETERAL: HCPCS | Performed by: UROLOGY

## 2022-01-12 PROCEDURE — 52240 CYSTOSCOPY AND TREATMENT: CPT | Performed by: UROLOGY

## 2022-01-12 PROCEDURE — 2709999900 HC NON-CHARGEABLE SUPPLY: Performed by: UROLOGY

## 2022-01-12 PROCEDURE — C2617 STENT, NON-COR, TEM W/O DEL: HCPCS | Performed by: UROLOGY

## 2022-01-12 PROCEDURE — 6360000004 HC RX CONTRAST MEDICATION: Performed by: UROLOGY

## 2022-01-12 PROCEDURE — 6370000000 HC RX 637 (ALT 250 FOR IP): Performed by: REGISTERED NURSE

## 2022-01-12 PROCEDURE — C1769 GUIDE WIRE: HCPCS | Performed by: UROLOGY

## 2022-01-12 DEVICE — STENT URET 6FR L24CM DBL PIGTAILS LUBRICIOUS COAT TAPR TIP: Type: IMPLANTABLE DEVICE | Site: URETER | Status: FUNCTIONAL

## 2022-01-12 RX ORDER — MEPERIDINE HYDROCHLORIDE 25 MG/ML
12.5 INJECTION INTRAMUSCULAR; INTRAVENOUS; SUBCUTANEOUS EVERY 5 MIN PRN
Status: DISCONTINUED | OUTPATIENT
Start: 2022-01-12 | End: 2022-01-12 | Stop reason: HOSPADM

## 2022-01-12 RX ORDER — PROPOFOL 10 MG/ML
INJECTION, EMULSION INTRAVENOUS PRN
Status: DISCONTINUED | OUTPATIENT
Start: 2022-01-12 | End: 2022-01-12 | Stop reason: SDUPTHER

## 2022-01-12 RX ORDER — WATER FOR INJ.,BACTERIOSTATIC
VIAL (ML) INJECTION
Status: COMPLETED
Start: 2022-01-12 | End: 2022-01-12

## 2022-01-12 RX ORDER — CEPHALEXIN 500 MG/1
500 CAPSULE ORAL EVERY 12 HOURS
Qty: 20 CAPSULE | Refills: 0 | Status: SHIPPED | OUTPATIENT
Start: 2022-01-12 | End: 2022-01-24 | Stop reason: ALTCHOICE

## 2022-01-12 RX ORDER — LIDOCAINE HYDROCHLORIDE 20 MG/ML
JELLY TOPICAL PRN
Status: DISCONTINUED | OUTPATIENT
Start: 2022-01-12 | End: 2022-01-12 | Stop reason: ALTCHOICE

## 2022-01-12 RX ORDER — 0.9 % SODIUM CHLORIDE 0.9 %
500 INTRAVENOUS SOLUTION INTRAVENOUS
Status: DISCONTINUED | OUTPATIENT
Start: 2022-01-12 | End: 2022-01-12 | Stop reason: HOSPADM

## 2022-01-12 RX ORDER — ONDANSETRON 2 MG/ML
4 INJECTION INTRAMUSCULAR; INTRAVENOUS
Status: DISCONTINUED | OUTPATIENT
Start: 2022-01-12 | End: 2022-01-12 | Stop reason: HOSPADM

## 2022-01-12 RX ORDER — MAGNESIUM HYDROXIDE 1200 MG/15ML
LIQUID ORAL PRN
Status: DISCONTINUED | OUTPATIENT
Start: 2022-01-12 | End: 2022-01-12 | Stop reason: ALTCHOICE

## 2022-01-12 RX ORDER — FENTANYL CITRATE 50 UG/ML
25 INJECTION, SOLUTION INTRAMUSCULAR; INTRAVENOUS EVERY 5 MIN PRN
Status: COMPLETED | OUTPATIENT
Start: 2022-01-12 | End: 2022-01-12

## 2022-01-12 RX ORDER — LIDOCAINE HYDROCHLORIDE 10 MG/ML
INJECTION, SOLUTION INFILTRATION; PERINEURAL PRN
Status: DISCONTINUED | OUTPATIENT
Start: 2022-01-12 | End: 2022-01-12 | Stop reason: SDUPTHER

## 2022-01-12 RX ORDER — LABETALOL HYDROCHLORIDE 5 MG/ML
5 INJECTION, SOLUTION INTRAVENOUS EVERY 10 MIN PRN
Status: DISCONTINUED | OUTPATIENT
Start: 2022-01-12 | End: 2022-01-12 | Stop reason: HOSPADM

## 2022-01-12 RX ORDER — DIPHENHYDRAMINE HYDROCHLORIDE 50 MG/ML
12.5 INJECTION INTRAMUSCULAR; INTRAVENOUS
Status: DISCONTINUED | OUTPATIENT
Start: 2022-01-12 | End: 2022-01-12 | Stop reason: HOSPADM

## 2022-01-12 RX ORDER — HYDROCODONE BITARTRATE AND ACETAMINOPHEN 5; 325 MG/1; MG/1
2 TABLET ORAL PRN
Status: COMPLETED | OUTPATIENT
Start: 2022-01-12 | End: 2022-01-12

## 2022-01-12 RX ORDER — SODIUM CHLORIDE, SODIUM LACTATE, POTASSIUM CHLORIDE, CALCIUM CHLORIDE 600; 310; 30; 20 MG/100ML; MG/100ML; MG/100ML; MG/100ML
INJECTION, SOLUTION INTRAVENOUS CONTINUOUS PRN
Status: DISCONTINUED | OUTPATIENT
Start: 2022-01-12 | End: 2022-01-12 | Stop reason: SDUPTHER

## 2022-01-12 RX ORDER — METOCLOPRAMIDE HYDROCHLORIDE 5 MG/ML
10 INJECTION INTRAMUSCULAR; INTRAVENOUS
Status: DISCONTINUED | OUTPATIENT
Start: 2022-01-12 | End: 2022-01-12 | Stop reason: HOSPADM

## 2022-01-12 RX ORDER — SODIUM CHLORIDE, SODIUM LACTATE, POTASSIUM CHLORIDE, CALCIUM CHLORIDE 600; 310; 30; 20 MG/100ML; MG/100ML; MG/100ML; MG/100ML
INJECTION, SOLUTION INTRAVENOUS CONTINUOUS
Status: CANCELLED | OUTPATIENT
Start: 2022-01-12

## 2022-01-12 RX ORDER — DIPHENHYDRAMINE HYDROCHLORIDE 50 MG/ML
50 INJECTION INTRAMUSCULAR; INTRAVENOUS
Status: COMPLETED | OUTPATIENT
Start: 2022-01-12 | End: 2022-01-12

## 2022-01-12 RX ORDER — ROCURONIUM BROMIDE 10 MG/ML
INJECTION, SOLUTION INTRAVENOUS PRN
Status: DISCONTINUED | OUTPATIENT
Start: 2022-01-12 | End: 2022-01-12 | Stop reason: SDUPTHER

## 2022-01-12 RX ORDER — ATROPA BELLADONNA AND OPIUM 16.2; 6 MG/1; MG/1
SUPPOSITORY RECTAL PRN
Status: DISCONTINUED | OUTPATIENT
Start: 2022-01-12 | End: 2022-01-12 | Stop reason: SDUPTHER

## 2022-01-12 RX ORDER — HYDROCODONE BITARTRATE AND ACETAMINOPHEN 5; 325 MG/1; MG/1
1 TABLET ORAL PRN
Status: COMPLETED | OUTPATIENT
Start: 2022-01-12 | End: 2022-01-12

## 2022-01-12 RX ADMIN — LIDOCAINE HYDROCHLORIDE 60 MG: 10 INJECTION, SOLUTION INFILTRATION; PERINEURAL at 11:43

## 2022-01-12 RX ADMIN — HYDROCODONE BITARTRATE AND ACETAMINOPHEN 2 TABLET: 5; 325 TABLET ORAL at 14:24

## 2022-01-12 RX ADMIN — SODIUM CHLORIDE, POTASSIUM CHLORIDE, SODIUM LACTATE AND CALCIUM CHLORIDE: 600; 310; 30; 20 INJECTION, SOLUTION INTRAVENOUS at 11:43

## 2022-01-12 RX ADMIN — HYDROMORPHONE HYDROCHLORIDE 0.5 MG: 1 INJECTION, SOLUTION INTRAMUSCULAR; INTRAVENOUS; SUBCUTANEOUS at 13:38

## 2022-01-12 RX ADMIN — DIPHENHYDRAMINE HYDROCHLORIDE 50 MG: 50 INJECTION, SOLUTION INTRAMUSCULAR; INTRAVENOUS at 10:02

## 2022-01-12 RX ADMIN — FLUORESCEIN SODIUM 25 MG: 100 INJECTION INTRAVENOUS at 12:04

## 2022-01-12 RX ADMIN — ROCURONIUM BROMIDE 50 MG: 10 INJECTION INTRAVENOUS at 11:43

## 2022-01-12 RX ADMIN — FENTANYL CITRATE 25 MCG: 50 INJECTION INTRAMUSCULAR; INTRAVENOUS at 13:28

## 2022-01-12 RX ADMIN — FENTANYL CITRATE 25 MCG: 50 INJECTION INTRAMUSCULAR; INTRAVENOUS at 13:04

## 2022-01-12 RX ADMIN — BACTERIOSTATIC WATER 6 ML: 1 INJECTION, SOLUTION INTRAMUSCULAR; INTRAVENOUS; SUBCUTANEOUS at 10:04

## 2022-01-12 RX ADMIN — PROPOFOL 200 MG: 10 INJECTION, EMULSION INTRAVENOUS at 11:43

## 2022-01-12 RX ADMIN — FENTANYL CITRATE 25 MCG: 50 INJECTION INTRAMUSCULAR; INTRAVENOUS at 13:10

## 2022-01-12 RX ADMIN — FENTANYL CITRATE 25 MCG: 50 INJECTION INTRAMUSCULAR; INTRAVENOUS at 13:18

## 2022-01-12 RX ADMIN — ATROPA BELLADONNA AND OPIUM 60 MG: 16.2; 6 SUPPOSITORY RECTAL at 12:41

## 2022-01-12 RX ADMIN — GENTAMICIN SULFATE 120 MG: 40 INJECTION, SOLUTION INTRAMUSCULAR; INTRAVENOUS at 11:48

## 2022-01-12 RX ADMIN — PROPOFOL 100 MG: 10 INJECTION, EMULSION INTRAVENOUS at 12:00

## 2022-01-12 RX ADMIN — HYDROCORTISONE SODIUM SUCCINATE 300 MG: 100 INJECTION, POWDER, FOR SOLUTION INTRAMUSCULAR; INTRAVENOUS at 09:58

## 2022-01-12 ASSESSMENT — PULMONARY FUNCTION TESTS
PIF_VALUE: 23
PIF_VALUE: 2
PIF_VALUE: 31
PIF_VALUE: 23
PIF_VALUE: 2
PIF_VALUE: 24
PIF_VALUE: 18
PIF_VALUE: 0
PIF_VALUE: 26
PIF_VALUE: 2
PIF_VALUE: 29
PIF_VALUE: 19
PIF_VALUE: 23
PIF_VALUE: 19
PIF_VALUE: 23
PIF_VALUE: 10
PIF_VALUE: 23
PIF_VALUE: 19
PIF_VALUE: 23
PIF_VALUE: 19
PIF_VALUE: 23
PIF_VALUE: 26
PIF_VALUE: 23
PIF_VALUE: 19
PIF_VALUE: 19
PIF_VALUE: 18
PIF_VALUE: 21
PIF_VALUE: 17
PIF_VALUE: 23
PIF_VALUE: 23
PIF_VALUE: 18
PIF_VALUE: 24
PIF_VALUE: 18
PIF_VALUE: 18
PIF_VALUE: 23
PIF_VALUE: 23
PIF_VALUE: 18
PIF_VALUE: 23
PIF_VALUE: 19
PIF_VALUE: 18
PIF_VALUE: 4
PIF_VALUE: 19
PIF_VALUE: 23
PIF_VALUE: 18
PIF_VALUE: 10
PIF_VALUE: 18
PIF_VALUE: 23
PIF_VALUE: 3
PIF_VALUE: 23
PIF_VALUE: 32
PIF_VALUE: 19
PIF_VALUE: 26
PIF_VALUE: 31
PIF_VALUE: 17
PIF_VALUE: 0
PIF_VALUE: 25
PIF_VALUE: 19
PIF_VALUE: 23
PIF_VALUE: 2
PIF_VALUE: 24
PIF_VALUE: 19
PIF_VALUE: 18
PIF_VALUE: 18
PIF_VALUE: 8
PIF_VALUE: 24
PIF_VALUE: 23
PIF_VALUE: 17
PIF_VALUE: 27
PIF_VALUE: 32
PIF_VALUE: 18
PIF_VALUE: 26
PIF_VALUE: 2
PIF_VALUE: 32
PIF_VALUE: 20
PIF_VALUE: 21
PIF_VALUE: 32

## 2022-01-12 ASSESSMENT — PAIN SCALES - GENERAL
PAINLEVEL_OUTOF10: 9
PAINLEVEL_OUTOF10: 8
PAINLEVEL_OUTOF10: 9
PAINLEVEL_OUTOF10: 8

## 2022-01-12 ASSESSMENT — PAIN DESCRIPTION - DESCRIPTORS: DESCRIPTORS: SPASM;SORE

## 2022-01-12 ASSESSMENT — PAIN DESCRIPTION - LOCATION: LOCATION: BACK

## 2022-01-12 NOTE — ANESTHESIA PRE PROCEDURE
Department of Anesthesiology  Preprocedure Note       Name:  Arsenio Traore   Age:  64 y.o.  :  1960                                          MRN:  [id number]         Date:  2022      Surgeon: Melanie Pino):  Jared Bautista MD    Procedure: Procedure(s):  CYSTOSCOPY RIGHT  RETROGRADE PYELOGRAM POSSIBLE RIGHT DOUBLE J STENT  TRANSURETHRAL RESECTION OF  BLADDER TUMOR G.E.T. WITH MUSCLE RELAXATION (PAT AT [facility] [facility])    Medications prior to admission:   Prior to Admission medications    Medication Sig Start Date End Date Taking? Authorizing Provider   acarbose (PRECOSE) 25 MG tablet Take 1 tablet by mouth 3 times daily (with meals) 21  Yes Lovely Moe MD   clonazePAM (KLONOPIN) 0.5 MG tablet TAKE ONE TABLET BY MOUTH AT BEDTIME AS TOLERATED 21  Yes Historical Provider, MD   metoprolol tartrate (LOPRESSOR) 50 MG tablet Take 1 tablet by mouth 2 times daily 18  Yes Historical Provider, MD   magnesium oxide (MAG-OX) 400 MG tablet Take 400 mg by mouth daily   Yes Historical Provider, MD   amitriptyline (ELAVIL) 10 MG tablet Take 10 mg by mouth daily  17  Yes Historical Provider, MD   oxyCODONE-acetaminophen (PERCOCET) 5-325 MG per tablet Take 1 tablet by mouth every 6 hours as needed  . 17  Yes Historical Provider, MD   atorvastatin (LIPITOR) 40 MG tablet Take 40 mg by mouth daily   Yes Historical Provider, MD   blood glucose test strips (EXACTECH TEST) strip 1 each by In Vitro route daily As needed.  21   Lovely Moe MD   tiZANidine (ZANAFLEX) 4 MG tablet  21   Historical Provider, MD   clopidogrel (PLAVIX) 75 MG tablet Take 1 tablet by mouth daily 18   Historical Provider, MD   nitroGLYCERIN (NITROSTAT) 0.4 MG SL tablet Place 1 tablet under the tongue See Admin Instructions 18   Historical Provider, MD   topiramate (TOPAMAX) 100 MG tablet Take 1 tablet by mouth 2 times daily  19   Historical Provider, MD       Current medications:    Current Facility-Administered Medications   Medication Dose Route Frequency Provider Last Rate Last Admin    gentamicin (GARAMYCIN) 120 mg in dextrose 5 % 100 mL IVPB  120 mg IntraVENous On Call to Brody Santana 75MD Camilo           Allergies: Allergies   Allergen Reactions    Dye [Iodides] Anaphylaxis    Erythromycin Rash    Avelox [Moxifloxacin Hcl In Nacl]     Lyrica [Pregabalin]      confusion    Metformin Hives    Neurontin [Gabapentin]      No focus    Sulfa Antibiotics Hives and Rash       Problem List:    Patient Active Problem List   Diagnosis Code    Type 2 diabetes mellitus without complication, without long-term current use of insulin (HCC) E11.9    Depressive disorder, not elsewhere classified F32.9    Essential hypertension, benign I10    Low back pain M54.50    DDD (degenerative disc disease), lumbar M51.36    Ulnar neuropathy G56.20    Iron deficiency anemia due to dietary causes D50.8    Other acquired hemolytic anemias (HCC) D59.8    Malabsorption of iron K90.9       Past Medical History:        Diagnosis Date    Anemia     she sees hematology    Anxiety     Back pain with right-sided radiculopathy     Chronic left shoulder pain     Depression     Fibromyalgia     Hepatitis     Hyperlipidemia     Hypertension     Osteoarthritis     SHOULDER    Restless legs syndrome     Type II or unspecified type diabetes mellitus without mention of complication, not stated as uncontrolled        Past Surgical History:        Procedure Laterality Date    COLONOSCOPY      HYSTERECTOMY      MAMMO IMPLANT DIGITAL DIAG BI  03/02/2011    ROTATOR CUFF REPAIR Left 10/2021    TONSILLECTOMY AND ADENOIDECTOMY         Social History:    Social History     Tobacco Use    Smoking status: Former Smoker     Packs/day: 0.05     Years: 20.00     Pack years: 1.00     Types: Cigarettes     Quit date: 11/3/2018     Years since quitting: 3.1    Smokeless tobacco: Never Used   Substance Use Topics    Alcohol use:  Yes Alcohol/week: 0.0 standard drinks     Comment: social                                Counseling given: Not Answered      Vital Signs (Current):   Vitals:    01/12/22 0930 01/12/22 1001   BP: (!) 123/59    Pulse: 104 94   Resp: 16    Temp: 97.3 °F (36.3 °C)    TempSrc: Temporal    SpO2: 100% 99%   Weight: 150 lb (68 kg)    Height: 5' (1.524 m)                                               BP Readings from Last 3 Encounters:   01/12/22 (!) 123/59   01/07/22 (!) 118/58   01/04/22 110/60       NPO Status: Time of last liquid consumption: 2230                        Time of last solid consumption: 2230                        Date of last liquid consumption: 01/11/22                        Date of last solid food consumption: 01/11/22    BMI:   Wt Readings from Last 3 Encounters:   01/12/22 150 lb (68 kg)   01/07/22 152 lb (68.9 kg)   01/04/22 150 lb (68 kg)     Body mass index is 29.29 kg/m².     CBC:   Lab Results   Component Value Date    WBC 8.6 01/07/2022    WBC 12.2 01/04/2022    RBC 3.25 01/07/2022    RBC 3.78 01/30/2019    HGB 9.8 01/07/2022    HCT 32.6 01/07/2022     01/07/2022    RDW 16.0 01/04/2022     01/07/2022       CMP:   Lab Results   Component Value Date     10/07/2021    K 4.3 10/07/2021     10/07/2021    CO2 26 01/11/2016    BUN 7 01/11/2016    CREATININE 0.69 10/07/2021    GFRAA >60 10/07/2021    AGRATIO 1.4 11/05/2018    LABGLOM >60 10/07/2021    GLUCOSE 126 10/07/2021    PROT 7.1 10/07/2021    CALCIUM 9.6 10/07/2021    BILITOT 0.4 10/07/2021    ALKPHOS 128 10/07/2021    AST 15 10/07/2021    ALT 37 10/07/2021       POC Tests:   Recent Labs     01/12/22  0958   POCGLU 152*       Coags:   Lab Results   Component Value Date    PROTIME 11.2 10/07/2021    INR 1.0 10/07/2021    APTT 33 10/07/2021       HCG (If Applicable): No results found for: PREGTESTUR, PREGSERUM, HCG, HCGQUANT     ABGs: No results found for: PHART, PO2ART, NQF6EHN, JNZ1DYV, BEART, X5RTHONA     Type & Screen (If Applicable):  No results found for: [facility]    Drug/Infectious Status (If Applicable):  Lab Results   Component Value Date    HEPCAB NONREACTIVE 08/02/2021       COVID-19 Screening (If Applicable):   Lab Results   Component Value Date    COVID19 Not Detected 01/12/2022    COVID19 Not Detected 07/06/2020           Anesthesia Evaluation  Patient summary reviewed and Nursing notes reviewed no history of anesthetic complications:   Airway: Mallampati: II  TM distance: >3 FB   Neck ROM: full  Mouth opening: > = 3 FB Dental: normal exam         Pulmonary:Negative Pulmonary ROS and normal exam                               Cardiovascular:Negative CV ROS  Exercise tolerance: good (>4 METS),   (+) hypertension:, past MI: > 6 months and no interval change, CABG/stent: no interval change,       ECG reviewed               Beta Blocker:  Not on Beta Blocker         Neuro/Psych:   Negative Neuro/Psych ROS  (+) psychiatric history:            GI/Hepatic/Renal: Neg GI/Hepatic/Renal ROS  (+) liver disease:,           Endo/Other: Negative Endo/Other ROS   (+) DiabetesType II DM, , .          Pt had PAT visit. Abdominal:             Vascular: negative vascular ROS. Other Findings:             Anesthesia Plan      general     ASA 3     (ETT)  Induction: intravenous. MIPS: Postoperative opioids intended and Prophylactic antiemetics administered. Anesthetic plan and risks discussed with patient. Plan discussed with CRNA.     Attending anesthesiologist reviewed and agrees with Pre Eval content              Brigette Porter MD   1/12/2022

## 2022-01-12 NOTE — BRIEF OP NOTE
Brief Postoperative Note      Patient: Lida Christine  YOB: 1960  MRN: [id number]    Date of Procedure: 1/12/2022    Pre-Op Diagnosis: BLADDER POLYP    Post-Op Diagnosis: Same       Procedure(s):  CYSTOSCOPY RIGHT  RETROGRADE PYELOGRAM RIGHT DOUBLE J STENT  TRANSURETHRAL RESECTION OF  BLADDER TUMOR G.E.T. WITH MUSCLE RELAXATION  Right double j stent placement  >5 cm sq area of resection  Surgeon(s):  Jael Tobin MD    Assistant:  * No surgical staff found *    Anesthesia: General    Estimated Blood Loss (mL): Minimal    Complications: None    Specimens:   ID Type Source Tests Collected by Time Destination   A : BLADDER TUMOR Tissue Bladder SURGICAL PATHOLOGY Jael Tobin MD 1/12/2022 1237    B : [address] [facility] Tissue Bladder SURGICAL PATHOLOGY Jael Tobin MD 1/12/2022 1225        Implants:  Implant Name Type Inv.  Item Serial No.  Lot No. LRB No. Used Action   STENT URET 6FR [id number] [address] [employer] T[employer]  STENT URET 6FR L24CM [address] [employer] TIP  [employer] [id number] Right 1 Implanted         Drains: * No LDAs found *    Findings: normal retrograde pyelograms  No allergic reaction to retrograde xray contrast    Electronically signed by Jael Tobin MD on 1/12/2022 at 12:46 PM

## 2022-01-12 NOTE — OP NOTE
[address]                      [address], [address] [facility]                                OPERATIVE REPORT    PATIENT NAME: Lang Spencer                       :        1960  MED REC NO:   [id number]                            ROOM:  ACCOUNT NO:   [de-identified]                           ADMIT DATE: 2022  PROVIDER:     Rober Yarbrough MD    DATE OF PROCEDURE:  2022    PREOPERATIVE DIAGNOSIS:  Bladder tumor involving the right lateral wall  near the right ureteral orifice. POSTOPERATIVE DIAGNOSIS:  Bladder tumor involving the right lateral wall  near the right ureteral orifice. OPERATION:  Cystoscopy, bilateral retrograde pyelogram, right double-J  stent placement, TURBT of large bladder tumor (>5 cm). SURGEON:  Gini Moe. Tyesha Hinojosa MD    ANESTHESIA:  General endotracheal.    ESTIMATED BLOOD LOSS:  Not applicable. INDICATIONS:  The patient is a 66-year-old female who was noted to have  a filling defect in the bladder consistent with a bladder tumor on  ultrasound. She had a CT scan without contrast due to contrast allergy  which showed no upper tract lesions or parenchymal lesions of the  kidneys. The patient underwent a cystoscopy in the office, which showed  a large bladder tumor lateral to the right ureteral orifice. The  patient has been scheduled for bilateral retrograde pyelogram.  The  patient has anaphylactic reaction to IVP dye, therefore will be  premedicated with steroids and Benadryl. The patient also got  transfused 1 unit of blood. She has history of chronic anemia by  Hematology/Oncology. The patient will undergo resection of this tumor,  possible right double-J stent placement and possible discharged to home  with Watt catheter. OPERATIVE NOTE:  The patient was brought into the operating room and  placed on the operating table in dorsal lithotomy position after  initiation of general endotracheal anesthesia.   She received  preoperative IV Solu-Medrol 300 mg and Benadryl 50 mg. She also  received preoperative IV antibiotics in the form of gentamicin. After  sterile prep and drape, a 21-Maltese cystoscope was used to perform  bilateral retrograde pyelogram.  Left ureter was visualized and a  4-Maltese end-hole was used to perform a retrograde pyelogram which  showed normal anatomy with no evidence of hydronephrosis and no evidence  of filling defects. The left retrograde pyelogram was completely within  normal limits. We had difficulty finding the right ureteral orifice. Therefore, the patient received fluorescein IV after which we noted the  right ureteral orifice. A right retrograde pyelogram was obtained using  again IVP contrast which showed no evidence of abnormalities, no  evidence of hydronephrosis, and no evidence of filling defects. The  right collecting system was completely within normal limits. At this  time, a guidewire was placed in the right collecting system and a 6 x 24  double-J stent was placed to allow us to do the resection without  worrying of injuring the right ureteral orifice. Following the  placement of right double-J stent, a resectoscope was placed into the  bladder and a >5 cm area was noted and resected. Deep biopsies  were obtained with cold cup biopsy forceps. The area was then  fulgurated and no further bleeding was noted. A double-J stent was left  in place and a Watt catheter was placed. A B and O suppository was  placed rectally. The patient was discharged to recovery room in stable  condition. The patient tolerated the procedure well. There were no  complications.         Aiden Mane MD    D: 01/12/2022 13:30:43       T: 01/12/2022 13:33:34     [id number]  Job#: [id number]     Doc#: [id number]    CC:

## 2022-01-12 NOTE — PROGRESS NOTES
Pt c/o severe bladder spasms s/o B&O suppository in OR and 50mcg fentanyl given at this time. Called Dr. Zoya Butler told that since patient received suppository there isnt anything more that can be given for them. He mentioned that she historically requires a good deal of pain meds and to just keep giving IV meds.

## 2022-01-12 NOTE — ANESTHESIA POSTPROCEDURE EVALUATION
Department of Anesthesiology  Postprocedure Note    Patient: Sreedhar Daniel  MRN: [id number]  YOB: 1960  Date of evaluation: 1/12/2022  Time:  12:50 PM     Procedure Summary     Date: 01/12/22 Room / Location: [facility]    Anesthesia Start: 1133 Anesthesia Stop: 1250    Procedures:       CYSTOSCOPY RIGHT  RETROGRADE PYELOGRAM RIGHT DOUBLE J STENT (Right )      TRANSURETHRAL RESECTION OF  BLADDER TUMOR G.E.T. WITH MUSCLE RELAXATION (Right ) Diagnosis: (BLADDER POLYP)    Surgeons: Van Cárdenas MD Responsible Provider: Bari Aguilar MD    Anesthesia Type: general ASA Status: 3          Anesthesia Type: general    Sudha Phase I:      Sudha Phase II:      Last vitals: Reviewed and per EMR flowsheets.        Anesthesia Post Evaluation    Patient location during evaluation: bedside  Patient participation: complete - patient participated  Level of consciousness: awake and awake and alert  Pain score: 0  Airway patency: patent  Nausea & Vomiting: no nausea and no vomiting  Complications: no  Cardiovascular status: blood pressure returned to baseline and hemodynamically stable  Respiratory status: acceptable  Hydration status: euvolemic

## 2022-01-13 ENCOUNTER — TELEPHONE (OUTPATIENT)
Dept: UROLOGY | Age: 62
End: 2022-01-13

## 2022-01-13 RX ORDER — ONDANSETRON 4 MG/1
4 TABLET, FILM COATED ORAL 3 TIMES DAILY PRN
Qty: 15 TABLET | Refills: 1 | Status: SHIPPED | OUTPATIENT
Start: 2022-01-13

## 2022-01-13 NOTE — TELEPHONE ENCOUNTER
Renea Hare had surgery yesterday. She is nauseated, has low grade fever and has pain.     They are wanting something for the nausea called in and to make sure the pain she is experiencing is normal.    Daughter=Beverley [phone]

## 2022-01-20 ENCOUNTER — PROCEDURE VISIT (OUTPATIENT)
Dept: UROLOGY | Age: 62
End: 2022-01-20
Payer: MEDICARE

## 2022-01-20 VITALS
WEIGHT: 150 LBS | HEART RATE: 90 BPM | SYSTOLIC BLOOD PRESSURE: 136 MMHG | HEIGHT: 60 IN | BODY MASS INDEX: 29.45 KG/M2 | DIASTOLIC BLOOD PRESSURE: 72 MMHG

## 2022-01-20 DIAGNOSIS — R31.0 GROSS HEMATURIA: Primary | ICD-10-CM

## 2022-01-20 DIAGNOSIS — C68.9 UROTHELIAL CARCINOMA (HCC): ICD-10-CM

## 2022-01-20 LAB
BILIRUBIN, POC: ABNORMAL
BLOOD URINE, POC: ABNORMAL
CLARITY, POC: ABNORMAL
COLOR, POC: ABNORMAL
GLUCOSE URINE, POC: ABNORMAL
KETONES, POC: ABNORMAL
LEUKOCYTE EST, POC: ABNORMAL
NITRITE, POC: ABNORMAL
PH, POC: 6
PROTEIN, POC: 100
SPECIFIC GRAVITY, POC: >=1.03
UROBILINOGEN, POC: 0.2

## 2022-01-20 PROCEDURE — 81003 URINALYSIS AUTO W/O SCOPE: CPT | Performed by: UROLOGY

## 2022-01-20 PROCEDURE — 52310 CYSTOSCOPY AND TREATMENT: CPT | Performed by: UROLOGY

## 2022-01-20 RX ORDER — CEPHALEXIN 500 MG/1
500 CAPSULE ORAL ONCE
Qty: 1 CAPSULE | Refills: 0 | COMMUNITY
Start: 2022-01-20 | End: 2022-01-20

## 2022-01-20 NOTE — PROGRESS NOTES
Urology  Patient here for stent removal after TURBT  Pathology reviewed  FINAL DIAGNOSIS:   A: BLADDER TUMOR -   LOW-GRADE PAPILLARY UROTHELIAL CARCINOMA, NONINVASIVE. B: DEEP BLADDER WALL -   NO EVIDENCE OF TUMOR.      URINARY BLADDER:  BIOPSY AND TRANSURETHRAL RESECTION OF BLADDER TUMOR   (TURBT)     PROCEDURE:  TURBT   TUMOR SITE: NOT SPECIFIED   HISTOLOGIC TYPE: PAPILLARY UROTHELIAL CARCINOMA, NONINVASIVE   HISTOLOGIC GRADE: LOW-GRADE   TUMOR EXTENT: NONINVASIVE PAPILLARY CARCINOMA   LYMPH-VASCULAR INVASION:  NOT IDENTIFIED   MUSCULARIS PROPRIA: PRESENT   ADDITIONAL FINDINGS: CAUTERY ARTIFACT    Procedure note  Flexible cystoscopy/premedicated with antibiotics, local anesthetic  Cystoscopic findings no change  Stent stent visualized and removed  Discussed BCG immunotherapy/observation  Plan is to check another cystoscopy in 6 weeks  No plans on doing another biopsy at this time  Will discuss BCG at that time  Caitie Salinas MD

## 2022-01-22 ENCOUNTER — HOSPITAL ENCOUNTER (EMERGENCY)
Age: 62
Discharge: HOME | End: 2022-01-22
Payer: MEDICARE

## 2022-01-22 VITALS
HEART RATE: 84 BPM | DIASTOLIC BLOOD PRESSURE: 78 MMHG | OXYGEN SATURATION: 98 % | RESPIRATION RATE: 16 BRPM | BODY MASS INDEX: 29.29 KG/M2 | TEMPERATURE: 98.3 F | SYSTOLIC BLOOD PRESSURE: 136 MMHG | WEIGHT: 150 LBS

## 2022-01-22 DIAGNOSIS — R33.9 URINARY RETENTION: Primary | ICD-10-CM

## 2022-01-22 DIAGNOSIS — N32.89 BLADDER SPASMS: ICD-10-CM

## 2022-01-22 LAB
ALBUMIN SERPL-MCNC: 3.8 G/DL (ref 3.5–4.6)
ALP BLD-CCNC: 209 U/L (ref 40–130)
ALT SERPL-CCNC: 48 U/L (ref 0–33)
ANION GAP SERPL CALCULATED.3IONS-SCNC: 12 MEQ/L (ref 9–15)
APTT: 49.8 SEC (ref 24.4–36.8)
AST SERPL-CCNC: 25 U/L (ref 0–35)
BACTERIA: ABNORMAL /HPF
BASOPHILS ABSOLUTE: 0.1 K/UL (ref 0–0.2)
BASOPHILS RELATIVE PERCENT: 1.1 %
BILIRUB SERPL-MCNC: <0.2 MG/DL (ref 0.2–0.7)
BILIRUBIN URINE: ABNORMAL
BLOOD, URINE: ABNORMAL
BUN BLDV-MCNC: 11 MG/DL (ref 8–23)
CALCIUM SERPL-MCNC: 9.4 MG/DL (ref 8.5–9.9)
CHLORIDE BLD-SCNC: 108 MEQ/L (ref 95–107)
CLARITY: ABNORMAL
CO2: 19 MEQ/L (ref 20–31)
COLOR: ABNORMAL
CREAT SERPL-MCNC: 0.62 MG/DL (ref 0.5–0.9)
EOSINOPHILS ABSOLUTE: 0.1 K/UL (ref 0–0.7)
EOSINOPHILS RELATIVE PERCENT: 0.8 %
EPITHELIAL CELLS, UA: ABNORMAL /HPF (ref 0–5)
GFR AFRICAN AMERICAN: >60
GFR NON-AFRICAN AMERICAN: >60
GLOBULIN: 3.4 G/DL (ref 2.3–3.5)
GLUCOSE BLD-MCNC: 128 MG/DL (ref 70–99)
GLUCOSE URINE: NEGATIVE MG/DL
HCT VFR BLD CALC: 36.3 % (ref 37–47)
HEMOGLOBIN: 11.8 G/DL (ref 12–16)
HYALINE CASTS: ABNORMAL /HPF (ref 0–5)
INR BLD: 0.9
KETONES, URINE: NEGATIVE MG/DL
LEUKOCYTE ESTERASE, URINE: ABNORMAL
LYMPHOCYTES ABSOLUTE: 1.4 K/UL (ref 1–4.8)
LYMPHOCYTES RELATIVE PERCENT: 19.5 %
MCH RBC QN AUTO: 29.1 PG (ref 27–31.3)
MCHC RBC AUTO-ENTMCNC: 32.6 % (ref 33–37)
MCV RBC AUTO: 89.5 FL (ref 82–100)
MONOCYTES ABSOLUTE: 0.5 K/UL (ref 0.2–0.8)
MONOCYTES RELATIVE PERCENT: 6.5 %
NEUTROPHILS ABSOLUTE: 5.2 K/UL (ref 1.4–6.5)
NEUTROPHILS RELATIVE PERCENT: 72.1 %
NITRITE, URINE: POSITIVE
PDW BLD-RTO: 16.2 % (ref 11.5–14.5)
PH UA: 5 (ref 5–9)
PLATELET # BLD: 479 K/UL (ref 130–400)
POTASSIUM SERPL-SCNC: 4.1 MEQ/L (ref 3.4–4.9)
PROTEIN UA: 100 MG/DL
PROTHROMBIN TIME: 12.5 SEC (ref 12.3–14.9)
RBC # BLD: 4.06 M/UL (ref 4.2–5.4)
RBC UA: >100 /HPF (ref 0–5)
SODIUM BLD-SCNC: 139 MEQ/L (ref 135–144)
SPECIFIC GRAVITY UA: 1.02 (ref 1–1.03)
TOTAL PROTEIN: 7.2 G/DL (ref 6.3–8)
URINE REFLEX TO CULTURE: YES
UROBILINOGEN, URINE: 0.2 E.U./DL
WBC # BLD: 7.2 K/UL (ref 4.8–10.8)
WBC UA: ABNORMAL /HPF (ref 0–5)

## 2022-01-22 PROCEDURE — 87086 URINE CULTURE/COLONY COUNT: CPT

## 2022-01-22 PROCEDURE — 36415 COLL VENOUS BLD VENIPUNCTURE: CPT

## 2022-01-22 PROCEDURE — 80053 COMPREHEN METABOLIC PANEL: CPT

## 2022-01-22 PROCEDURE — 81001 URINALYSIS AUTO W/SCOPE: CPT

## 2022-01-22 PROCEDURE — 96375 TX/PRO/DX INJ NEW DRUG ADDON: CPT

## 2022-01-22 PROCEDURE — 96374 THER/PROPH/DIAG INJ IV PUSH: CPT

## 2022-01-22 PROCEDURE — 85025 COMPLETE CBC W/AUTO DIFF WBC: CPT

## 2022-01-22 PROCEDURE — 85730 THROMBOPLASTIN TIME PARTIAL: CPT

## 2022-01-22 PROCEDURE — 6360000002 HC RX W HCPCS: Performed by: PHYSICIAN ASSISTANT

## 2022-01-22 PROCEDURE — 99284 EMERGENCY DEPT VISIT MOD MDM: CPT

## 2022-01-22 PROCEDURE — 85610 PROTHROMBIN TIME: CPT

## 2022-01-22 RX ORDER — ONDANSETRON 2 MG/ML
4 INJECTION INTRAMUSCULAR; INTRAVENOUS ONCE
Status: COMPLETED | OUTPATIENT
Start: 2022-01-22 | End: 2022-01-22

## 2022-01-22 RX ORDER — FENTANYL CITRATE 50 UG/ML
50 INJECTION, SOLUTION INTRAMUSCULAR; INTRAVENOUS ONCE
Status: COMPLETED | OUTPATIENT
Start: 2022-01-22 | End: 2022-01-22

## 2022-01-22 RX ORDER — MORPHINE SULFATE 4 MG/ML
4 INJECTION, SOLUTION INTRAMUSCULAR; INTRAVENOUS ONCE
Status: COMPLETED | OUTPATIENT
Start: 2022-01-22 | End: 2022-01-22

## 2022-01-22 RX ORDER — ATROPA BELLADONNA AND OPIUM 16.2; 6 MG/1; MG/1
60 SUPPOSITORY RECTAL EVERY 8 HOURS PRN
Qty: 9 SUPPOSITORY | Refills: 0 | Status: SHIPPED | OUTPATIENT
Start: 2022-01-22 | End: 2022-04-22

## 2022-01-22 RX ADMIN — ONDANSETRON 4 MG: 2 INJECTION INTRAMUSCULAR; INTRAVENOUS at 12:21

## 2022-01-22 RX ADMIN — MORPHINE SULFATE 4 MG: 4 INJECTION INTRAVENOUS at 12:20

## 2022-01-22 RX ADMIN — FENTANYL CITRATE 50 MCG: 50 INJECTION, SOLUTION INTRAMUSCULAR; INTRAVENOUS at 13:55

## 2022-01-22 ASSESSMENT — PAIN SCALES - GENERAL
PAINLEVEL_OUTOF10: 4
PAINLEVEL_OUTOF10: 8

## 2022-01-22 ASSESSMENT — ENCOUNTER SYMPTOMS
SORE THROAT: 0
COLOR CHANGE: 0
BACK PAIN: 0
ABDOMINAL DISTENTION: 0
ABDOMINAL PAIN: 1
SHORTNESS OF BREATH: 0
EYE DISCHARGE: 0
CONSTIPATION: 0
RHINORRHEA: 0

## 2022-01-22 ASSESSMENT — PAIN DESCRIPTION - PAIN TYPE: TYPE: ACUTE PAIN

## 2022-01-22 ASSESSMENT — PAIN DESCRIPTION - LOCATION: LOCATION: PELVIS

## 2022-01-22 ASSESSMENT — PAIN DESCRIPTION - DESCRIPTORS: DESCRIPTORS: SHARP

## 2022-01-22 NOTE — ED NOTES
Pt complaining of 10/10 back pain. PAYulisaC notified. Will follow up with pain medications.       Giuliano May RN  01/22/22 2436

## 2022-01-22 NOTE — ED NOTES
The patient called me this am about gross hematuria with clots and being unable to void. This started late last night and had no F/C or dysuria. She had a TURBT by Dr Tyesha Hinojosa on 1/12/22 and had a Rt double-J stent as well placed that was removed in office on Thursday 1/20/22. She has 2 cardiac stents and is on Plavix and resumed the Plavix 4 days ago because urine was clear at that time. I recommended she be seen in ED and I stopped by to see her while in the ED. She had a Watt in place and this helped relieve her bladder distention and discomfort. She was getting some mild spasms and Rt flank pain with the spasms which I explained are likely related to the recent stent and reflux from retention. Some clots passed with catheter placement and the urine was lighter red in color. Her H/H were stable and she is currently on Keflex per Dr Tyesha Hinojosa. The options of admission to hospitalist service vs discharge with close F/U with Dr Tyesha Hinojosa as an outpatient were discussed with her and the ED staff Mihai Mcdowell (she is a former oncology nurse). If discharged, she will see Dr Tyesha Hinojosa at 8 am on Monday. She will continue to hold Plavix and a urine C/S will be sent. She will drink plenty of liquids and no heavy activity. Will provide a B/O supp for spasm prior to D/C. Will provide percocet for pain. If she elects to stay as inpatient please alert me to consult so she can be followed.

## 2022-01-22 NOTE — ED PROVIDER NOTES
[facility] ED  eMERGENCY dEPARTMENT eNCOUnter      Pt Name: Arsenio Traore  MRN: [id number]  [place] 1960  Date of evaluation: 1/22/2022  Provider: Geetha Yao PA-C    CHIEF COMPLAINT       Chief Complaint   Patient presents with    Urinary Retention     Post op bladder tumor removal 9-10 days ago         HISTORY OF PRESENT ILLNESS   (Location/Symptom, Timing/Onset,Context/Setting, Quality, Duration, Modifying Factors, Severity)  Note limiting factors. Arsenio Traore is a 64 y.o. female who presents to the emergency department complaint of urinary retention. Patient states that she had a bladder tumor removed approximately 9 days ago by Dr. Maggy Boykin, she states she has been fine up until last evening, she states she started passing small amount of blood, but had started back on her Plavix as well 2 days ago. She states this morning she was only able to pass a small amount of blood, and no urine, she now feels full and distended and if she is obstructed. Denies any nausea nausea vomiting or fevers. She is rating her current pain at this time is an 8 out of 10. Past medical history significant for anemia, anxiety, depression, fibromyalgia, hepatitis, hyperlipidemia, hypertension, osteoarthritis, type 2 diabetes. HPI    NursingNotes were reviewed. REVIEW OF SYSTEMS    (2-9 systems for level 4, 10 or more for level 5)     Review of Systems   Constitutional: Negative for activity change and appetite change. HENT: Negative for congestion, ear discharge, ear pain, nosebleeds, rhinorrhea and sore throat. Eyes: Negative for discharge. Respiratory: Negative for shortness of breath. Cardiovascular: Negative for chest pain, palpitations and leg swelling. Gastrointestinal: Positive for abdominal pain. Negative for abdominal distention and constipation. Genitourinary: Positive for decreased urine volume and difficulty urinating.  Negative for dysuria, vaginal bleeding, vaginal discharge and vaginal pain. Musculoskeletal: Negative for arthralgias and back pain. Skin: Negative for color change, pallor, rash and wound. Neurological: Negative for dizziness, tremors, syncope, numbness and headaches. Psychiatric/Behavioral: Negative for agitation and confusion. Except as noted above the remainder of the review of systems was reviewed and negative. PAST MEDICAL HISTORY     Past Medical History:   Diagnosis Date    Anemia     she sees hematology    Anxiety     Back pain with right-sided radiculopathy     Chronic left shoulder pain     Depression     Fibromyalgia     Hepatitis     Hyperlipidemia     Hypertension     Osteoarthritis     SHOULDER    Restless legs syndrome     Type II or unspecified type diabetes mellitus without mention of complication, not stated as uncontrolled          SURGICALHISTORY       Past Surgical History:   Procedure Laterality Date    BLADDER SURGERY Right 1/12/2022    CYSTOSCOPY RIGHT  RETROGRADE PYELOGRAM RIGHT DOUBLE J STENT performed by An Pompa MD at [address] [place] [address] Right 1/12/2022    TRANSURETHRAL RESECTION OF  BLADDER TUMOR G.E.T. WITH MUSCLE RELAXATION performed by An Pompa MD at [place] [address]. BI  03/02/2011    ROTATOR CUFF REPAIR Left 10/2021    TONSILLECTOMY AND ADENOIDECTOMY           CURRENT MEDICATIONS       Previous Medications    ACARBOSE (PRECOSE) 25 MG TABLET    Take 1 tablet by mouth 3 times daily (with meals)    AMITRIPTYLINE (ELAVIL) 10 MG TABLET    Take 10 mg by mouth daily     ATORVASTATIN (LIPITOR) 40 MG TABLET    Take 40 mg by mouth daily    BLOOD GLUCOSE TEST STRIPS (EXACTECH TEST) STRIP    1 each by In Vitro route daily As needed.     CEPHALEXIN (KEFLEX) 500 MG CAPSULE    Take 1 capsule by mouth every 12 hours    CLONAZEPAM (KLONOPIN) 0.5 MG TABLET    TAKE ONE TABLET BY MOUTH AT BEDTIME AS TOLERATED    CLOPIDOGREL (PLAVIX) 75 MG TABLET    Take 1 tablet by mouth daily    MAGNESIUM OXIDE (MAG-OX) 400 MG TABLET    Take 400 mg by mouth daily    METOPROLOL TARTRATE (LOPRESSOR) 50 MG TABLET    Take 1 tablet by mouth 2 times daily    NITROGLYCERIN (NITROSTAT) 0.4 MG SL TABLET    Place 1 tablet under the tongue See Admin Instructions    ONDANSETRON (ZOFRAN) 4 MG TABLET    Take 1 tablet by mouth 3 times daily as needed for Nausea or Vomiting    OXYCODONE-ACETAMINOPHEN (PERCOCET) 5-325 MG PER TABLET    Take 1 tablet by mouth every 6 hours as needed  . TIZANIDINE (ZANAFLEX) 4 MG TABLET        TOPIRAMATE (TOPAMAX) 100 MG TABLET    Take 1 tablet by mouth 2 times daily        ALLERGIES     Dye [iodides], Erythromycin, Avelox [moxifloxacin hcl in nacl], Lyrica [pregabalin], Metformin, Neurontin [gabapentin], and Sulfa antibiotics    FAMILY HISTORY       Family History   Problem Relation Age of Onset    Diabetes Mother     Diabetes Father     Heart Attack Father     Heart Disease Father     Diabetes Brother     Depression Daughter           SOCIAL HISTORY       Social History     Socioeconomic History    Marital status:      Spouse name: None    Number of children: None    Years of education: None    Highest education level: None   Occupational History    None   Tobacco Use    Smoking status: Former Smoker     Packs/day: 0.05     Years: 20.00     Pack years: 1.00     Types: Cigarettes     Quit date: 11/3/2018     Years since quitting: 3.2    Smokeless tobacco: Never Used   Substance and Sexual Activity    Alcohol use:  Yes     Alcohol/week: 0.0 standard drinks     Comment: social    Drug use: No    Sexual activity: None   Other Topics Concern    None   Social History Narrative    None     Social Determinants of Health     Financial Resource Strain: Low Risk     Difficulty of Paying Living Expenses: Not very hard   Food Insecurity: No Food Insecurity    Worried About Running Out of Food in the Last Year: Never true   [employer] of Food in the Last Year: Never true   Transportation Needs:     Lack of Transportation (Medical): Not on file    Lack of Transportation (Non-Medical): Not on file   Physical Activity:     Days of Exercise per Week: Not on file    Minutes of Exercise per Session: Not on file   Stress:     Feeling of Stress : Not on file   Social Connections:     Frequency of Communication with Friends and Family: Not on file    Frequency of Social Gatherings with Friends and Family: Not on file    Attends [employer] Services: Not on file    Active Member of [address] [place] [address] [employer] or Organizations: Not on file    Attends Club or Organization Meetings: Not on file    Marital Status: Not on file   Intimate Partner Violence:     Fear of Current or Ex-Partner: Not on file    Emotionally Abused: Not on file    Physically Abused: Not on file    Sexually Abused: Not on file   Housing Stability:     Unable to Pay for Housing in the Last Year: Not on file    Number of [place] in the Last Year: Not on file    Unstable Housing in the Last Year: Not on file       SCREENINGS    Merriman Coma Scale  Eye Opening: Spontaneous  Best Verbal Response: Oriented  Best Motor Response: Obeys commands  Merriman Coma Scale Score: 15 @FLOW([id number])@      PHYSICAL EXAM    (up to 7 for level 4, 8 or more for level 5)     ED Triage Vitals   BP Temp Temp Source Pulse Resp SpO2 Height Weight   01/22/22 1137 01/22/22 1136 01/22/22 1136 01/22/22 1136 01/22/22 1136 01/22/22 1136 -- 01/22/22 1136   (!) 143/75 98.3 °F (36.8 °C) Oral 80 18 100 %  150 lb (68 kg)       Physical Exam  Vitals and nursing note reviewed. Constitutional:       General: She is not in acute distress. Appearance: She is well-developed. She is not ill-appearing, toxic-appearing or diaphoretic. HENT:      Head: Normocephalic. Nose: No congestion. Mouth/Throat:      Mouth: Mucous membranes are moist.      Pharynx: No oropharyngeal exudate or posterior oropharyngeal erythema. Eyes:      Extraocular Movements: Extraocular movements intact. Conjunctiva/sclera: Conjunctivae normal.      Pupils: Pupils are equal, round, and reactive to light. Neck:      Vascular: No JVD. Trachea: No tracheal deviation. Cardiovascular:      Rate and Rhythm: Normal rate. Pulses: Normal pulses. Heart sounds: Normal heart sounds. No murmur heard. No friction rub. No gallop. Pulmonary:      Effort: Pulmonary effort is normal. No tachypnea, accessory muscle usage, respiratory distress or retractions. Breath sounds: No stridor. No wheezing, rhonchi or rales. Chest:      Chest wall: No tenderness. Abdominal:      General: Abdomen is flat. Bowel sounds are normal. There is no distension or abdominal bruit. Palpations: There is no shifting dullness, fluid wave, hepatomegaly, splenomegaly, mass or pulsatile mass. Tenderness: There is no abdominal tenderness. There is no right CVA tenderness, left CVA tenderness, guarding or rebound. Negative signs include Galvan's sign, Rovsing's sign and McBurney's sign. Comments: Abdomen is soft mildly distended, no guarding mass or rebound, no CVA tenderness. Musculoskeletal:         General: No deformity. Cervical back: Normal range of motion and neck supple. No rigidity. Skin:     General: Skin is warm and dry. Capillary Refill: Capillary refill takes less than 2 seconds. Coloration: Skin is not jaundiced. Neurological:      General: No focal deficit present. Mental Status: She is alert and oriented to person, place, and time. Mental status is at baseline. Cranial Nerves: No cranial nerve deficit. Sensory: No sensory deficit. Motor: No weakness.       Coordination: Coordination normal.   Psychiatric:         Mood and Affect: Mood normal.         DIAGNOSTIC RESULTS     EKG: All EKG's are interpreted by the Emergency Department Physician who either signs or Co-signsthis chart in the absence of a cardiologist.        RADIOLOGY:   [place] Plan such as CT, Ultrasound and MRI are read by the radiologist. Plain radiographic images are visualized and preliminarily interpreted by the emergency physician with the below findings:        Interpretation per the Radiologist below, if available at the time ofthis note:    No orders to display         ED BEDSIDE ULTRASOUND:   Performed by ED Physician - none    LABS:  Labs Reviewed   COMPREHENSIVE METABOLIC PANEL - Abnormal; Notable for the following components:       Result Value    Chloride 108 (*)     CO2 19 (*)     Glucose 128 (*)     Alkaline Phosphatase 209 (*)     ALT 48 (*)     All other components within normal limits   CBC WITH AUTO DIFFERENTIAL - Abnormal; Notable for the following components:    RBC 4.06 (*)     Hemoglobin 11.8 (*)     Hematocrit 36.3 (*)     MCHC 32.6 (*)     RDW 16.2 (*)     Platelets 721 (*)     All other components within normal limits   URINE RT REFLEX TO CULTURE - Abnormal; Notable for the following components:    Color, UA RED (*)     Clarity, UA TURBID (*)     Bilirubin Urine SMALL (*)     Blood, Urine MODERATE (*)     Protein,  (*)     Nitrite, Urine POSITIVE (*)     Leukocyte Esterase, Urine MODERATE (*)     All other components within normal limits   APTT - Abnormal; Notable for the following components:    aPTT 49.8 (*)     All other components within normal limits   MICROSCOPIC URINALYSIS - Abnormal; Notable for the following components:    Bacteria, UA FEW (*)     RBC, UA >100 (*)     All other components within normal limits   CULTURE, URINE   PROTIME-INR       All other labs were within normal range or not returned as of this dictation.     EMERGENCY DEPARTMENT COURSE and DIFFERENTIAL DIAGNOSIS/MDM:   Vitals:    Vitals:    01/22/22 1136 01/22/22 1137   BP:  (!) 143/75   Pulse: 80    Resp: 18    Temp: 98.3 °F (36.8 °C)    TempSrc: Oral    SpO2: 100%    Weight: 150 lb (68 kg)           MDM  Number of Diagnoses or Management Options  Bladder spasms  Urinary retention  Diagnosis management comments: Patient presents emerged part with complaint of urinary retention, she states this morning she was not able to passing urine, and feels increasing sensation of a full bladder. She states she did pass some clots last evening and has had a recent bladder tumor resection approximately 9 days ago. She states this was done with Dr. Guy Kyle of [facility] urology. She states 4 days ago she did start back on her Plavix again and then started to have some bleeding. A Watt catheter was placed on patient's arrival as she had approximately 700 cc of retained urine. Urine is draining well, is still blood-tinged, but is improving as time goes on. She was given pain medication while in the emergency department, labs showed no signs for urinary tract infection, white blood cell count is 7.2 thousand. Kidney function within normal range. Dr. Lucio Wilkes of urology did come to the ED to visit with the patient. She is very comfortable at this time, and will be discharged home. She does currently have Percocet which she uses from her pain management specialist, which was just refilled on the seventh of this month. She was advised to continue with this as needed for pain control. She was also written for B&O suppositories for bladder spasms, and will follow-up with Dr. Sobia Thomas of urology Monday morning at 8 AM.  Watt catheter will remain in place. Patient was advised if she has any worsening symptoms return to the ED for reevaluation. CRITICAL CARE TIME   Total Critical Care time was 0 minutes, excluding separately reportableprocedures. There was a high probability of clinicallysignificant/life threatening deterioration in the patient's condition which required my urgent intervention. CONSULTS:  None    PROCEDURES:  Unless otherwise noted below, none     Procedures    FINAL IMPRESSION      1. Urinary retention    2.  Bladder spasms          DISPOSITION/PLAN   DISPOSITION Decision To Discharge 01/22/2022 02:21:29 PM      PATIENT REFERRED TO:  Bienvenido Green MD  [address] [place] [address]  [address] [place] [address],[address]    In 3 days      Ruby Beth MD  [address]  [phone]    On 1/24/2022  Report to the office of Dr. Iliana Silvestre 8 AM Monday morning for follow-up evaluation      Follow-up with your pain specialist Dr. Ahsan Archuleta as needed for additional pain control. DISCHARGE MEDICATIONS:  New Prescriptions    OPIUM-BELLADONNA (B&O SUPPRETTES) 16.2-60 MG SUPPOSITORY    Place 1 suppository rectally every 8 hours as needed for Pain for up to 3 days.           (Please note that portions of this note were completed with a voice recognition program.  Efforts were made to edit the dictations but occasionally words are mis-transcribed.)    Giovanna Weeks PA-C (electronically signed)  Attending Emergency Physician         Giovanna Weeks PA-C  01/22/22 [id number]

## 2022-01-22 NOTE — ED NOTES
Patient medicated per MAR. All questions answered and allergies reviewed. Will continue to monitor.       Lonnie Martinez RN  01/22/22 [id number]

## 2022-01-22 NOTE — ED TRIAGE NOTES
Patient had bladder tumor removal surgery approximately 9-10 days ago. Patient had stent removed this past week. Patient reports that today she is having difficulty urinating, states that she is passing blood and clots. Patient sent to the emergency department by Dr Lucio Wilkes.

## 2022-01-22 NOTE — ED NOTES
Discharge instructions reviewed with patient. Medications reviewed and explained to patient. Patient denies any further questions at this time. Pt encouraged to make follow up appointments with PCP and any speciality referrals.       Tamara Rivera RN  01/22/22 [id number]

## 2022-01-24 ENCOUNTER — OFFICE VISIT (OUTPATIENT)
Dept: UROLOGY | Age: 62
End: 2022-01-24

## 2022-01-24 VITALS
DIASTOLIC BLOOD PRESSURE: 62 MMHG | OXYGEN SATURATION: 99 % | HEIGHT: 60 IN | WEIGHT: 150 LBS | HEART RATE: 93 BPM | SYSTOLIC BLOOD PRESSURE: 114 MMHG | BODY MASS INDEX: 29.45 KG/M2

## 2022-01-24 DIAGNOSIS — K58.2 IRRITABLE BOWEL SYNDROME WITH BOTH CONSTIPATION AND DIARRHEA: Primary | ICD-10-CM

## 2022-01-24 LAB — URINE CULTURE, ROUTINE: NORMAL

## 2022-01-24 PROCEDURE — 99024 POSTOP FOLLOW-UP VISIT: CPT | Performed by: UROLOGY

## 2022-01-24 NOTE — PROGRESS NOTES
Urology  Postop check after going to the ER following TURBT for clot retention  Watt catheter removed  Urine is clear  Patient finished Keflex  Follow-up in 5 weeks as scheduled for cystoscopy  Intermittent cath female catheters given to patient in case she is having issues voiding  Heaven Dobbins MD

## 2022-03-03 ENCOUNTER — PROCEDURE VISIT (OUTPATIENT)
Dept: UROLOGY | Age: 62
End: 2022-03-03

## 2022-03-03 VITALS
HEIGHT: 60 IN | DIASTOLIC BLOOD PRESSURE: 60 MMHG | HEART RATE: 82 BPM | SYSTOLIC BLOOD PRESSURE: 102 MMHG | OXYGEN SATURATION: 99 % | BODY MASS INDEX: 30.43 KG/M2 | WEIGHT: 155 LBS

## 2022-03-03 DIAGNOSIS — R31.0 GROSS HEMATURIA: ICD-10-CM

## 2022-03-03 DIAGNOSIS — R31.0 GROSS HEMATURIA: Primary | ICD-10-CM

## 2022-03-03 RX ORDER — NITROFURANTOIN 25; 75 MG/1; MG/1
100 CAPSULE ORAL 2 TIMES DAILY
Qty: 20 CAPSULE | Refills: 0 | Status: SHIPPED | OUTPATIENT
Start: 2022-03-03 | End: 2022-04-01 | Stop reason: ALTCHOICE

## 2022-03-05 LAB
ORGANISM: ABNORMAL
URINE CULTURE, ROUTINE: ABNORMAL
URINE CULTURE, ROUTINE: ABNORMAL

## 2022-03-07 ENCOUNTER — TELEPHONE (OUTPATIENT)
Dept: UROLOGY | Age: 62
End: 2022-03-07

## 2022-03-07 DIAGNOSIS — R10.9 RIGHT FLANK PAIN: Primary | ICD-10-CM

## 2022-03-07 NOTE — TELEPHONE ENCOUNTER
Pt called and stated that she has been taking Macrobid x4.5 days and has now developed right flank pain, muscle aches, 2+ pitting edema, increased 10 lbs in 2 days, swollen feet, rash on left calf. She has lasix and potassium on hand but does not feel like this is a cardiac issue. She is asking if she should switch antibiotics.  Please advise

## 2022-03-08 DIAGNOSIS — R10.9 RIGHT FLANK PAIN: ICD-10-CM

## 2022-03-09 LAB — URINE CULTURE, ROUTINE: NORMAL

## 2022-04-01 ENCOUNTER — PROCEDURE VISIT (OUTPATIENT)
Dept: UROLOGY | Age: 62
End: 2022-04-01
Payer: MEDICARE

## 2022-04-01 VITALS
HEIGHT: 60 IN | SYSTOLIC BLOOD PRESSURE: 92 MMHG | DIASTOLIC BLOOD PRESSURE: 62 MMHG | HEART RATE: 91 BPM | WEIGHT: 155 LBS | OXYGEN SATURATION: 95 % | BODY MASS INDEX: 30.43 KG/M2

## 2022-04-01 DIAGNOSIS — R31.0 GROSS HEMATURIA: Primary | ICD-10-CM

## 2022-04-01 DIAGNOSIS — Z85.51 HISTORY OF BLADDER CANCER: ICD-10-CM

## 2022-04-01 LAB
BILIRUBIN, POC: ABNORMAL
BLOOD URINE, POC: ABNORMAL
CLARITY, POC: CLEAR
COLOR, POC: YELLOW
GLUCOSE URINE, POC: ABNORMAL
KETONES, POC: ABNORMAL
LEUKOCYTE EST, POC: ABNORMAL
NITRITE, POC: ABNORMAL
PH, POC: 5.5
PROTEIN, POC: ABNORMAL
SPECIFIC GRAVITY, POC: >=1.03
UROBILINOGEN, POC: 0.2

## 2022-04-01 PROCEDURE — 52000 CYSTOURETHROSCOPY: CPT | Performed by: UROLOGY

## 2022-04-01 PROCEDURE — 81003 URINALYSIS AUTO W/O SCOPE: CPT | Performed by: UROLOGY

## 2022-04-01 RX ORDER — AMITRIPTYLINE HYDROCHLORIDE 25 MG/1
TABLET, FILM COATED ORAL
COMMUNITY
Start: 2022-03-22

## 2022-04-01 RX ORDER — NITROFURANTOIN 25; 75 MG/1; MG/1
100 CAPSULE ORAL ONCE
Qty: 1 CAPSULE | Refills: 0 | COMMUNITY
Start: 2022-04-01 | End: 2022-04-01

## 2022-04-19 ENCOUNTER — OFFICE VISIT (OUTPATIENT)
Dept: UROLOGY | Age: 62
End: 2022-04-19
Payer: MEDICARE

## 2022-04-19 VITALS
HEIGHT: 60 IN | SYSTOLIC BLOOD PRESSURE: 138 MMHG | HEART RATE: 83 BPM | OXYGEN SATURATION: 98 % | DIASTOLIC BLOOD PRESSURE: 64 MMHG | BODY MASS INDEX: 30.43 KG/M2 | WEIGHT: 155 LBS

## 2022-04-19 DIAGNOSIS — Z85.51 HISTORY OF BLADDER CANCER: Primary | ICD-10-CM

## 2022-04-19 LAB
BILIRUBIN, POC: NORMAL
BLOOD URINE, POC: NORMAL
CLARITY, POC: CLEAR
COLOR, POC: YELLOW
GLUCOSE URINE, POC: NORMAL
KETONES, POC: NORMAL
LEUKOCYTE EST, POC: NORMAL
NITRITE, POC: NORMAL
PH, POC: 6.5
PROTEIN, POC: NORMAL
SPECIFIC GRAVITY, POC: 1.01
UROBILINOGEN, POC: 0.2

## 2022-04-19 PROCEDURE — 51720 TREATMENT OF BLADDER LESION: CPT | Performed by: UROLOGY

## 2022-04-19 PROCEDURE — 81003 URINALYSIS AUTO W/O SCOPE: CPT | Performed by: UROLOGY

## 2022-04-19 NOTE — PROGRESS NOTES
Urology  Patient here to have 6 weeks of BCG immunotherapy intravesically  FINAL DIAGNOSIS:   A: BLADDER TUMOR -   LOW-GRADE PAPILLARY UROTHELIAL CARCINOMA, NONINVASIVE. B: DEEP BLADDER WALL -   NO EVIDENCE OF TUMOR.      URINARY BLADDER:  BIOPSY AND TRANSURETHRAL RESECTION OF BLADDER TUMOR   (TURBT)     PROCEDURE:  TURBT   TUMOR SITE: NOT SPECIFIED   HISTOLOGIC TYPE: PAPILLARY UROTHELIAL CARCINOMA, NONINVASIVE   HISTOLOGIC GRADE: LOW-GRADE   TUMOR EXTENT: NONINVASIVE PAPILLARY CARCINOMA   LYMPH-VASCULAR INVASION:  NOT IDENTIFIED   MUSCULARIS PROPRIA: PRESENT   ADDITIONAL FINDINGS: CAUTERY ARTIFACT    Patient will undergo BCG immunotherapy due to extensive disease within the bladder      Procedure note  BCG solution 50 cc instilled into bladder under sterile conditions  Minimal residual  Follow-up 1 week  Urinalysis clear  Jose Terry MD

## 2022-04-22 ENCOUNTER — OFFICE VISIT (OUTPATIENT)
Dept: PRIMARY CARE CLINIC | Age: 62
End: 2022-04-22
Payer: MEDICARE

## 2022-04-22 VITALS
HEART RATE: 82 BPM | DIASTOLIC BLOOD PRESSURE: 78 MMHG | OXYGEN SATURATION: 99 % | SYSTOLIC BLOOD PRESSURE: 144 MMHG | BODY MASS INDEX: 31.8 KG/M2 | WEIGHT: 162 LBS | HEIGHT: 60 IN | TEMPERATURE: 98 F

## 2022-04-22 DIAGNOSIS — Z00.00 PREVENTATIVE HEALTH CARE: ICD-10-CM

## 2022-04-22 DIAGNOSIS — R31.0 GROSS HEMATURIA: ICD-10-CM

## 2022-04-22 DIAGNOSIS — E11.9 TYPE 2 DIABETES MELLITUS WITHOUT COMPLICATION, WITHOUT LONG-TERM CURRENT USE OF INSULIN (HCC): Primary | ICD-10-CM

## 2022-04-22 DIAGNOSIS — E11.9 TYPE 2 DIABETES MELLITUS WITHOUT COMPLICATION, WITHOUT LONG-TERM CURRENT USE OF INSULIN (HCC): ICD-10-CM

## 2022-04-22 LAB
ALBUMIN SERPL-MCNC: 4.6 G/DL (ref 3.5–4.6)
ALP BLD-CCNC: 137 U/L (ref 40–130)
ALT SERPL-CCNC: 17 U/L (ref 0–33)
ANION GAP SERPL CALCULATED.3IONS-SCNC: 13 MEQ/L (ref 9–15)
AST SERPL-CCNC: 19 U/L (ref 0–35)
BILIRUB SERPL-MCNC: <0.2 MG/DL (ref 0.2–0.7)
BILIRUBIN URINE: NEGATIVE
BLOOD, URINE: NEGATIVE
BUN BLDV-MCNC: 9 MG/DL (ref 8–23)
CALCIUM SERPL-MCNC: 9.1 MG/DL (ref 8.5–9.9)
CHLORIDE BLD-SCNC: 108 MEQ/L (ref 95–107)
CLARITY: CLEAR
CO2: 20 MEQ/L (ref 20–31)
COLOR: YELLOW
CREAT SERPL-MCNC: 0.56 MG/DL (ref 0.5–0.9)
CREATININE URINE: 78.2 MG/DL
GFR AFRICAN AMERICAN: >60
GFR NON-AFRICAN AMERICAN: >60
GLOBULIN: 2.9 G/DL (ref 2.3–3.5)
GLUCOSE BLD-MCNC: 95 MG/DL (ref 70–99)
GLUCOSE URINE: NEGATIVE MG/DL
HBA1C MFR BLD: 5.1 % (ref 4.8–5.9)
KETONES, URINE: NEGATIVE MG/DL
LEUKOCYTE ESTERASE, URINE: NEGATIVE
MICROALBUMIN UR-MCNC: <1.2 MG/DL
MICROALBUMIN/CREAT UR-RTO: NORMAL MG/G (ref 0–30)
NITRITE, URINE: NEGATIVE
PH UA: 5.5 (ref 5–9)
POTASSIUM SERPL-SCNC: 4.3 MEQ/L (ref 3.4–4.9)
PROTEIN UA: NEGATIVE MG/DL
SODIUM BLD-SCNC: 141 MEQ/L (ref 135–144)
SPECIFIC GRAVITY UA: 1.01 (ref 1–1.03)
TOTAL PROTEIN: 7.5 G/DL (ref 6.3–8)
UROBILINOGEN, URINE: 0.2 E.U./DL

## 2022-04-22 PROCEDURE — 1036F TOBACCO NON-USER: CPT | Performed by: INTERNAL MEDICINE

## 2022-04-22 PROCEDURE — G8417 CALC BMI ABV UP PARAM F/U: HCPCS | Performed by: INTERNAL MEDICINE

## 2022-04-22 PROCEDURE — G8427 DOCREV CUR MEDS BY ELIG CLIN: HCPCS | Performed by: INTERNAL MEDICINE

## 2022-04-22 PROCEDURE — 3044F HG A1C LEVEL LT 7.0%: CPT | Performed by: INTERNAL MEDICINE

## 2022-04-22 PROCEDURE — 99213 OFFICE O/P EST LOW 20 MIN: CPT | Performed by: INTERNAL MEDICINE

## 2022-04-22 PROCEDURE — 3017F COLORECTAL CA SCREEN DOC REV: CPT | Performed by: INTERNAL MEDICINE

## 2022-04-22 PROCEDURE — 2022F DILAT RTA XM EVC RTNOPTHY: CPT | Performed by: INTERNAL MEDICINE

## 2022-04-22 ASSESSMENT — PATIENT HEALTH QUESTIONNAIRE - PHQ9
6. FEELING BAD ABOUT YOURSELF - OR THAT YOU ARE A FAILURE OR HAVE LET YOURSELF OR YOUR FAMILY DOWN: 0
8. MOVING OR SPEAKING SO SLOWLY THAT OTHER PEOPLE COULD HAVE NOTICED. OR THE OPPOSITE, BEING SO FIGETY OR RESTLESS THAT YOU HAVE BEEN MOVING AROUND A LOT MORE THAN USUAL: 0
1. LITTLE INTEREST OR PLEASURE IN DOING THINGS: 0
7. TROUBLE CONCENTRATING ON THINGS, SUCH AS READING THE NEWSPAPER OR WATCHING TELEVISION: 0
SUM OF ALL RESPONSES TO PHQ QUESTIONS 1-9: 0
3. TROUBLE FALLING OR STAYING ASLEEP: 0
SUM OF ALL RESPONSES TO PHQ QUESTIONS 1-9: 0
10. IF YOU CHECKED OFF ANY PROBLEMS, HOW DIFFICULT HAVE THESE PROBLEMS MADE IT FOR YOU TO DO YOUR WORK, TAKE CARE OF THINGS AT HOME, OR GET ALONG WITH OTHER PEOPLE: 0
4. FEELING TIRED OR HAVING LITTLE ENERGY: 0
9. THOUGHTS THAT YOU WOULD BE BETTER OFF DEAD, OR OF HURTING YOURSELF: 0
2. FEELING DOWN, DEPRESSED OR HOPELESS: 0
SUM OF ALL RESPONSES TO PHQ QUESTIONS 1-9: 0
SUM OF ALL RESPONSES TO PHQ QUESTIONS 1-9: 0
SUM OF ALL RESPONSES TO PHQ9 QUESTIONS 1 & 2: 0
5. POOR APPETITE OR OVEREATING: 0

## 2022-04-22 ASSESSMENT — ENCOUNTER SYMPTOMS
ABDOMINAL DISTENTION: 0
APNEA: 0
BLOOD IN STOOL: 0
COUGH: 0

## 2022-04-22 NOTE — PROGRESS NOTES
Jose Costello 64 y.o. female presents today with   Chief Complaint   Patient presents with    Annual Exam    Diabetes    Hypertension       Diabetes  She presents for her follow-up diabetic visit. She has type 2 diabetes mellitus. Her disease course has been stable. Pertinent negatives for hypoglycemia include no confusion, dizziness or speech difficulty. Pertinent negatives for diabetes include no chest pain. Pertinent negatives for hypoglycemia complications include no blackouts. Symptoms are stable.        Past Medical History:   Diagnosis Date    Anemia     she sees hematology    Anxiety     Back pain with right-sided radiculopathy     Chronic left shoulder pain     Depression     Fibromyalgia     Hepatitis     Hyperlipidemia     Hypertension     Osteoarthritis     SHOULDER    Restless legs syndrome     Type II or unspecified type diabetes mellitus without mention of complication, not stated as uncontrolled      Patient Active Problem List    Diagnosis Date Noted    Malabsorption of iron 02/19/2019    Iron deficiency anemia due to dietary causes 01/22/2019    Other acquired hemolytic anemias ([facility] 01/22/2019    Low back pain 05/20/2015    DDD (degenerative disc disease), lumbar 05/20/2015    Ulnar neuropathy 05/20/2015    Depressive disorder, not elsewhere classified 05/18/2012    Essential hypertension, benign 05/18/2012    Type 2 diabetes mellitus without complication, without long-term current use of insulin ([facility] 10/04/2011     Past Surgical History:   Procedure Laterality Date    BLADDER SURGERY Right 1/12/2022    CYSTOSCOPY RIGHT  RETROGRADE PYELOGRAM RIGHT DOUBLE J STENT performed by Cate Hou MD [address] Right 1/12/2022    TRANSURETHRAL RESECTION OF  BLADDER TUMOR G.E.T. WITH MUSCLE RELAXATION performed by Cate Hou MD at [address]  03/02/2011    ROTATOR CUFF REPAIR Left 10/2021  TONSILLECTOMY AND ADENOIDECTOMY       Family History   Problem Relation Age of Onset    Diabetes Mother     Diabetes Father     Heart Attack Father     Heart Disease Father     Diabetes Brother     Depression Daughter      Social History     Socioeconomic History    Marital status:      Spouse name: None    Number of children: None    Years of education: None    Highest education level: None   Occupational History    None   Tobacco Use    Smoking status: Former Smoker     Packs/day: 0.05     Years: 20.00     Pack years: 1.00     Types: Cigarettes     Quit date: 11/3/2018     Years since quitting: 3.4    Smokeless tobacco: Never Used   Substance and Sexual Activity    Alcohol use: Yes     Alcohol/week: 0.0 standard drinks     Comment: social    Drug use: No    Sexual activity: None   Other Topics Concern    None   Social History Narrative    None     Social Determinants of Health     Financial Resource Strain: Low Risk     Difficulty of Paying Living Expenses: Not very hard   Food Insecurity: No Food Insecurity    Worried About Running Out of Food in the Last Year: Never true    [employer] of Food in the Last Year: Never true   Transportation Needs:     Lack of Transportation (Medical): Not on file    Lack of Transportation (Non-Medical):  Not on file   Physical Activity:     Days of Exercise per Week: Not on file    Minutes of Exercise per Session: Not on file   Stress:     Feeling of Stress : Not on file   Social Connections:     Frequency of Communication with Friends and Family: Not on file    Frequency of Social Gatherings with Friends and Family: Not on file    Attends [employer] Services: Not on file    Active Member of Clubs or Organizations: Not on file    Attends Club or Organization Meetings: Not on file    Marital Status: Not on file   Intimate Partner Violence:     Fear of Current or Ex-Partner: Not on file    Emotionally Abused: Not on file    Physically Abused: Not on file    Sexually Abused: Not on file   Housing Stability:     Unable to Pay for Housing in the Last Year: Not on file    Number of Places Lived in the Last Year: Not on file    Unstable Housing in the Last Year: Not on file     Allergies   Allergen Reactions    Dye [Iodides] Anaphylaxis    Erythromycin Rash    Avelox [Moxifloxacin Hcl In Nacl]     Lyrica [Pregabalin]      confusion    Metformin Hives    Neurontin [Gabapentin]      No focus    Sulfa Antibiotics Hives and Rash       Review of Systems   Constitutional: Negative for chills and fever. HENT: Negative for congestion, facial swelling and nosebleeds. Eyes: Negative for photophobia and visual disturbance. Respiratory: Negative for apnea, cough and choking. Cardiovascular: Negative for chest pain and palpitations. Gastrointestinal: Negative for abdominal distention and blood in stool. Genitourinary: Positive for hematuria. Negative for enuresis. Musculoskeletal: Negative for gait problem and joint swelling. Skin: Negative for rash. Neurological: Negative for dizziness, syncope and speech difficulty. Hematological: Does not bruise/bleed easily. Psychiatric/Behavioral: Negative for confusion, hallucinations and suicidal ideas. Vitals:    04/22/22 1314 04/22/22 1321   BP: (!) 150/72 (!) 144/78   Site: Right Upper Arm Left Upper Arm   Position: Sitting Sitting   Cuff Size: Medium Adult Medium Adult   Pulse: 82    Temp: 98 °F (36.7 °C)    SpO2: 99%    Weight: 162 lb (73.5 kg)    Height: 5' (1.524 m)        Physical Exam  Constitutional:       Appearance: She is well-developed. HENT:      Head: Normocephalic and atraumatic. Eyes:      Conjunctiva/sclera: Conjunctivae normal.   Cardiovascular:      Rate and Rhythm: Normal rate and regular rhythm. Heart sounds: Normal heart sounds. Pulmonary:      Effort: No respiratory distress. Breath sounds: Normal breath sounds. No wheezing.    Abdominal: General: Bowel sounds are normal. There is no distension. Musculoskeletal:         General: Normal range of motion. Cervical back: Normal range of motion. Skin:     Coloration: Skin is not jaundiced. Neurological:      Mental Status: She is alert and oriented to person, place, and time. Cranial Nerves: No cranial nerve deficit. Psychiatric:         Mood and Affect: Mood normal.        Assessment/Plan  Patience Del Cid was seen today for annual exam, diabetes and hypertension. Diagnoses and all orders for this visit:    Type 2 diabetes mellitus without complication, without long-term current use of insulin (HCC)  -     Microalbumin / Creatinine Urine Ratio; Future  -     Hemoglobin A1C; Future  -     Comprehensive Metabolic Panel; Future    Gross hematuria  -     Urinalysis; Future    Preventative health care  -     [facility] Referral to [facility] Clinical Specialist        No follow-ups on file.     Santos Ceron MD

## 2022-04-25 ENCOUNTER — CLINICAL DOCUMENTATION (OUTPATIENT)
Dept: SPIRITUAL SERVICES | Age: 62
End: 2022-04-25

## 2022-04-25 NOTE — ACP (ADVANCE CARE PLANNING)
Advance Care Planning    Ambulatory ACP Specialist Patient Outreach    Date:  4/25/2022  ACP Specialist:  Mychal Rao    Outreach call to patient in follow-up to ACP Specialist referral from: Norma Mann MD    [x] PCP  [] Provider   [] Ambulatory Care Management [] Other for Reason:        [] Early ACP Decision-Making   [x] Advance Directive Assistance   [] Discuss Goals of Care   [] Code Status Discussion   [] Completion of Portable DNR order   [] Complete POST/MOST   [] Other (Specify)    Date Referral Received:  4/22/2022    Today's Outreach:  [x] First   [] Second  [] Third                               First outreach made by [x]  phone  [x] email []   [employer]     Intervention:  [] Spoke with Patient  [x] Left VM requesting return call      Outcome:  ACP Coordinator left  offering Advance Care Planning assistance to patient. Requested a return call. Sent ACP package to email on file. Will follow up with patient in one week. Next Step:   [] ACP scheduled conversation  [x] Outreach again in one week               [x] Email / Mail ACP Info Sheets  [] Email / Mail Advance Directive            [] Close Referral. Routing closure to referring provider/staff and to ACP Specialist .      Thank you for this referral.

## 2022-04-26 ENCOUNTER — OFFICE VISIT (OUTPATIENT)
Dept: UROLOGY | Age: 62
End: 2022-04-26
Payer: MEDICARE

## 2022-04-26 VITALS
BODY MASS INDEX: 31.8 KG/M2 | HEIGHT: 60 IN | DIASTOLIC BLOOD PRESSURE: 70 MMHG | OXYGEN SATURATION: 99 % | WEIGHT: 162 LBS | SYSTOLIC BLOOD PRESSURE: 130 MMHG | HEART RATE: 76 BPM

## 2022-04-26 DIAGNOSIS — Z85.51 HISTORY OF BLADDER CANCER: Primary | ICD-10-CM

## 2022-04-26 LAB
BILIRUBIN, POC: ABNORMAL
BLOOD URINE, POC: ABNORMAL
CLARITY, POC: CLEAR
COLOR, POC: YELLOW
GLUCOSE URINE, POC: ABNORMAL
KETONES, POC: ABNORMAL
LEUKOCYTE EST, POC: ABNORMAL
NITRITE, POC: ABNORMAL
PH, POC: 7.5
PROTEIN, POC: ABNORMAL
SPECIFIC GRAVITY, POC: 1.01
UROBILINOGEN, POC: 0.2

## 2022-04-26 PROCEDURE — 51720 TREATMENT OF BLADDER LESION: CPT | Performed by: UROLOGY

## 2022-04-26 PROCEDURE — 81003 URINALYSIS AUTO W/O SCOPE: CPT | Performed by: UROLOGY

## 2022-04-26 NOTE — PROGRESS NOTES
Urology  Patient here to have second instillation of 6 weeks of BCG immunotherapy intravesically  FINAL DIAGNOSIS:   A: BLADDER TUMOR -   LOW-GRADE PAPILLARY UROTHELIAL CARCINOMA, NONINVASIVE. B: DEEP BLADDER WALL -   NO EVIDENCE OF TUMOR.      URINARY BLADDER:  BIOPSY AND TRANSURETHRAL RESECTION OF BLADDER TUMOR   (TURBT)     PROCEDURE:  TURBT   TUMOR SITE: NOT SPECIFIED   HISTOLOGIC TYPE: PAPILLARY UROTHELIAL CARCINOMA, NONINVASIVE   HISTOLOGIC GRADE: LOW-GRADE   TUMOR EXTENT: NONINVASIVE PAPILLARY CARCINOMA   LYMPH-VASCULAR INVASION:  NOT IDENTIFIED   MUSCULARIS PROPRIA: PRESENT   ADDITIONAL FINDINGS: CAUTERY ARTIFACT     Patient will undergo BCG immunotherapy due to extensive disease within the bladder        Procedure note  BCG installation #2  BCG solution 50 cc instilled into bladder under sterile conditions  Minimal residual about 100 cc  Follow-up 1 week  Urinalysis clear  Theresa Cummings MD

## 2022-04-28 ASSESSMENT — ENCOUNTER SYMPTOMS
FACIAL SWELLING: 0
PHOTOPHOBIA: 0
CHOKING: 0

## 2022-05-02 ENCOUNTER — CLINICAL DOCUMENTATION (OUTPATIENT)
Dept: SPIRITUAL SERVICES | Age: 62
End: 2022-05-02

## 2022-05-02 NOTE — ACP (ADVANCE CARE PLANNING)
Advance Care Planning    Ambulatory ACP Specialist Patient Outreach    Date:  5/2/2022  ACP Specialist:  Brigida Dee    Outreach call to patient in follow-up to ACP Specialist referral from: Yesica Johansen MD    [x] PCP  [] Provider   [] Ambulatory Care Management [] Other for Reason:        [] Early ACP Decision-Making   [x] Advance Directive Assistance   [] Discuss Goals of Care   [] Code Status Discussion   [] Completion of Portable DNR order   [] Complete POST/MOST   [] Other (Specify)    Date Referral Received:  4/22/2022    Today's Outreach:  [] First   [x] Second  [] Third                               Third outreach made by []  phone  [] email []   [employer]     Intervention:  [] Spoke with Patient  [x] Left VM requesting return call      Outcome:   Left detailed VM offering appointment with ACP Specialist for assistance with advance care planning. Will reach out again in one week if return call not received. Next Step:   [] ACP scheduled conversation  [x] Outreach again in one week               [] Email / Mail ACP Info Sheets  [] Email / Mail Advance Directive            [] Close Referral. Routing closure to referring provider/staff and to ACP Specialist .      Thank you for this referral.

## 2022-05-03 ENCOUNTER — OFFICE VISIT (OUTPATIENT)
Dept: UROLOGY | Age: 62
End: 2022-05-03
Payer: MEDICARE

## 2022-05-03 VITALS
SYSTOLIC BLOOD PRESSURE: 128 MMHG | HEART RATE: 79 BPM | OXYGEN SATURATION: 98 % | DIASTOLIC BLOOD PRESSURE: 60 MMHG | WEIGHT: 162 LBS | HEIGHT: 60 IN | BODY MASS INDEX: 31.8 KG/M2

## 2022-05-03 DIAGNOSIS — Z85.51 HISTORY OF BLADDER CANCER: Primary | ICD-10-CM

## 2022-05-03 PROCEDURE — 51720 TREATMENT OF BLADDER LESION: CPT | Performed by: UROLOGY

## 2022-05-03 PROCEDURE — 81003 URINALYSIS AUTO W/O SCOPE: CPT | Performed by: UROLOGY

## 2022-05-03 NOTE — PROGRESS NOTES
Urology  Patient here for BCG No. 3  Had some mild arthralgia following the last BCG treatment  Urinalysis clear  Patient wants to continue with treatments and does not want to take a week off which was offered to her    Procedure note  BCG solution 50 cc was instilled to the bladder via straight cath  Minimal residual  Follow-up next week  Nuzhat Umana MD

## 2022-05-09 ENCOUNTER — CLINICAL DOCUMENTATION (OUTPATIENT)
Dept: SPIRITUAL SERVICES | Age: 62
End: 2022-05-09

## 2022-05-10 ENCOUNTER — OFFICE VISIT (OUTPATIENT)
Dept: UROLOGY | Age: 62
End: 2022-05-10
Payer: MEDICARE

## 2022-05-10 VITALS
DIASTOLIC BLOOD PRESSURE: 60 MMHG | BODY MASS INDEX: 30.43 KG/M2 | HEIGHT: 60 IN | SYSTOLIC BLOOD PRESSURE: 106 MMHG | WEIGHT: 155 LBS | HEART RATE: 82 BPM

## 2022-05-10 DIAGNOSIS — Z85.51 HISTORY OF BLADDER CANCER: Primary | ICD-10-CM

## 2022-05-10 LAB
BILIRUBIN, POC: ABNORMAL
BLOOD URINE, POC: ABNORMAL
CLARITY, POC: CLEAR
COLOR, POC: YELLOW
GLUCOSE URINE, POC: ABNORMAL
KETONES, POC: ABNORMAL
LEUKOCYTE EST, POC: ABNORMAL
NITRITE, POC: ABNORMAL
PH, POC: 5.5
PROTEIN, POC: ABNORMAL
SPECIFIC GRAVITY, POC: 1.01
UROBILINOGEN, POC: 0.2

## 2022-05-10 PROCEDURE — 81003 URINALYSIS AUTO W/O SCOPE: CPT | Performed by: UROLOGY

## 2022-05-10 PROCEDURE — 51720 TREATMENT OF BLADDER LESION: CPT | Performed by: UROLOGY

## 2022-05-10 NOTE — ACP (ADVANCE CARE PLANNING)
Advance Care Planning    Ambulatory ACP Specialist Patient Outreach    Date:  5/9/2022  ACP Specialist:  Lori Hoyos    Outreach call to patient in follow-up to ACP Specialist referral from: Santos Ceron MD    [x] PCP  [] Provider   [] Ambulatory Care Management [] Other for Reason:        [] Early ACP Decision-Making   [x] Advance Directive Assistance   [] Discuss Goals of Care   [] Code Status Discussion   [] Completion of Portable DNR order   [] Complete POST/MOST   [] Other (Specify)    Date Referral Received:  4/22/2022    Today's Outreach:  [] First   [] Second  [] Third                               Third outreach made by []  phone  [x] email [x]   [employer]     Intervention:  [] Spoke with Patient  [] Left VM requesting return call      Outcome:  Message sent to patient's email on file and [employer] explaining that current referral for ACP Specialist conversation will be closed if response not received within 5 business days expressing interest in scheduling conversation. Next Step:   [] ACP scheduled conversation  [] Outreach again in one week               [] Email / Mail ACP Info Sheets  [] Email / Mail Advance Directive            [x] Close Referral. Routing closure to referring provider/staff and to ACP Specialist .      Thank you for this referral.

## 2022-05-10 NOTE — PROGRESS NOTES
Urology  Patient here for BCG treatment #4  No issues with last instillation  Urinalysis clear      Procedure note  Under sterile conditions 50 cc of BCG solution instilled into the bladder via a red rubber catheter  100 cc residual  Follow-up 1 week for BCG No. 5  Cecily Hammond MD

## 2022-05-17 ENCOUNTER — OFFICE VISIT (OUTPATIENT)
Dept: UROLOGY | Age: 62
End: 2022-05-17
Payer: MEDICARE

## 2022-05-17 VITALS
BODY MASS INDEX: 30.43 KG/M2 | HEART RATE: 79 BPM | SYSTOLIC BLOOD PRESSURE: 136 MMHG | OXYGEN SATURATION: 97 % | HEIGHT: 60 IN | DIASTOLIC BLOOD PRESSURE: 66 MMHG | WEIGHT: 155 LBS

## 2022-05-17 DIAGNOSIS — Z85.51 HISTORY OF BLADDER CANCER: Primary | ICD-10-CM

## 2022-05-17 LAB
BILIRUBIN, POC: NORMAL
BLOOD URINE, POC: NORMAL
CLARITY, POC: CLEAR
COLOR, POC: YELLOW
GLUCOSE URINE, POC: NORMAL
KETONES, POC: NORMAL
LEUKOCYTE EST, POC: NORMAL
NITRITE, POC: NORMAL
PH, POC: NORMAL
PROTEIN, POC: NORMAL
SPECIFIC GRAVITY, POC: 1.01
UROBILINOGEN, POC: 0.2

## 2022-05-17 PROCEDURE — 51720 TREATMENT OF BLADDER LESION: CPT | Performed by: UROLOGY

## 2022-05-17 PROCEDURE — 81003 URINALYSIS AUTO W/O SCOPE: CPT | Performed by: UROLOGY

## 2022-05-17 NOTE — PROGRESS NOTES
Urology  Patient here for BCG treatment #5  No issues with last instillation  Urinalysis clear        Procedure note  Under sterile conditions 50 cc of BCG solution instilled into the bladder via a red rubber catheter  100 cc residual  Follow-up 1 week for BCG No. 6  Cysto 6 weeks thereafter  Cecily Hammond MD

## 2022-05-24 ENCOUNTER — OFFICE VISIT (OUTPATIENT)
Dept: UROLOGY | Age: 62
End: 2022-05-24
Payer: MEDICARE

## 2022-05-24 VITALS
WEIGHT: 155 LBS | HEIGHT: 60 IN | SYSTOLIC BLOOD PRESSURE: 128 MMHG | OXYGEN SATURATION: 97 % | DIASTOLIC BLOOD PRESSURE: 78 MMHG | HEART RATE: 73 BPM | BODY MASS INDEX: 30.43 KG/M2

## 2022-05-24 DIAGNOSIS — Z85.51 HISTORY OF BLADDER CANCER: Primary | ICD-10-CM

## 2022-05-24 LAB
BILIRUBIN, POC: NORMAL
BLOOD URINE, POC: NORMAL
CLARITY, POC: CLEAR
COLOR, POC: YELLOW
GLUCOSE URINE, POC: NORMAL
KETONES, POC: NORMAL
LEUKOCYTE EST, POC: NORMAL
NITRITE, POC: NORMAL
PH, POC: 6
PROTEIN, POC: NORMAL
SPECIFIC GRAVITY, POC: <=1.005
UROBILINOGEN, POC: 0.2

## 2022-05-24 PROCEDURE — 51720 TREATMENT OF BLADDER LESION: CPT | Performed by: UROLOGY

## 2022-05-24 PROCEDURE — 81003 URINALYSIS AUTO W/O SCOPE: CPT | Performed by: UROLOGY

## 2022-05-24 NOTE — PROGRESS NOTES
Urology  Patient here for BCG treatment #6  No issues with last instillation  Urinalysis clear        Procedure note  Under sterile conditions 50 cc of BCG solution instilled into the bladder via a red rubber catheter  100 cc residual  Cysto 6 weeks   Hernandez Dee MD

## 2022-07-18 ENCOUNTER — PROCEDURE VISIT (OUTPATIENT)
Dept: UROLOGY | Age: 62
End: 2022-07-18
Payer: MEDICARE

## 2022-07-18 VITALS
BODY MASS INDEX: 30.43 KG/M2 | OXYGEN SATURATION: 99 % | WEIGHT: 155 LBS | HEIGHT: 60 IN | DIASTOLIC BLOOD PRESSURE: 62 MMHG | SYSTOLIC BLOOD PRESSURE: 116 MMHG | HEART RATE: 76 BPM

## 2022-07-18 DIAGNOSIS — Z85.51 HISTORY OF BLADDER CANCER: Primary | ICD-10-CM

## 2022-07-18 LAB
BILIRUBIN, POC: ABNORMAL
BLOOD URINE, POC: ABNORMAL
CLARITY, POC: CLEAR
COLOR, POC: YELLOW
GLUCOSE URINE, POC: ABNORMAL
KETONES, POC: ABNORMAL
LEUKOCYTE EST, POC: ABNORMAL
NITRITE, POC: ABNORMAL
PH, POC: 7.5
PROTEIN, POC: ABNORMAL
SPECIFIC GRAVITY, POC: 1.02
UROBILINOGEN, POC: 0.2

## 2022-07-18 PROCEDURE — 52000 CYSTOURETHROSCOPY: CPT | Performed by: UROLOGY

## 2022-07-18 PROCEDURE — 81003 URINALYSIS AUTO W/O SCOPE: CPT | Performed by: UROLOGY

## 2022-07-18 RX ORDER — NITROFURANTOIN 25; 75 MG/1; MG/1
100 CAPSULE ORAL ONCE
Qty: 1 CAPSULE | Refills: 0 | COMMUNITY
Start: 2022-07-18 | End: 2022-07-18

## 2022-07-18 NOTE — PROGRESS NOTES
Urology  History of superficial low-grade bladder cancer without invasion  Patient received 6 weeks of BCG immunotherapy  Here for surveillance cystoscopy      Procedure note  Flexible cystoscopy/local anesthetic/premedicated with antibiotic  Urinalysis clear  Normal urethra  Normal bladder neck  Clear urine jets from both ureters  No evidence of recurrent urothelial carcinoma  No evidence of inflammation  Normal cystoscopy  Schedule surveillance cystoscopy in 3 months  Sandra Chilel MD

## 2022-10-17 ENCOUNTER — PROCEDURE VISIT (OUTPATIENT)
Dept: UROLOGY | Age: 62
End: 2022-10-17
Payer: MEDICARE

## 2022-10-17 VITALS
HEIGHT: 60 IN | DIASTOLIC BLOOD PRESSURE: 64 MMHG | BODY MASS INDEX: 30.43 KG/M2 | OXYGEN SATURATION: 98 % | WEIGHT: 155 LBS | SYSTOLIC BLOOD PRESSURE: 130 MMHG | HEART RATE: 79 BPM

## 2022-10-17 DIAGNOSIS — Z85.51 HISTORY OF BLADDER CANCER: Primary | ICD-10-CM

## 2022-10-17 LAB
BILIRUBIN, POC: NORMAL
BLOOD URINE, POC: NORMAL
CLARITY, POC: CLEAR
COLOR, POC: YELLOW
GLUCOSE URINE, POC: NORMAL
KETONES, POC: NORMAL
LEUKOCYTE EST, POC: NORMAL
NITRITE, POC: NORMAL
PH, POC: 6.5
PROTEIN, POC: NORMAL
SPECIFIC GRAVITY, POC: 1.02
UROBILINOGEN, POC: 0.2

## 2022-10-17 PROCEDURE — 81003 URINALYSIS AUTO W/O SCOPE: CPT | Performed by: UROLOGY

## 2022-10-17 PROCEDURE — 52000 CYSTOURETHROSCOPY: CPT | Performed by: UROLOGY

## 2022-10-17 RX ORDER — NITROFURANTOIN 25; 75 MG/1; MG/1
100 CAPSULE ORAL ONCE
Qty: 1 CAPSULE | Refills: 0 | COMMUNITY
Start: 2022-10-17 | End: 2022-10-17

## 2022-10-17 NOTE — PROGRESS NOTES
Urology  History of superficial bladder cancer last cystoscopy 3 months ago showed no evidence of recurrent disease  Here for surveillance cystoscopy    Procedure note  Flexible cystoscope/local anesthetic/premedicated with antibiotic  Normal urethra  Normal bladder neck  Normal bladder mucosa  Clean jets of urine both ureters  No evidence of recurrent disease  Follow-up 3 months for surveillance cystoscopy  We will go to every 6-month regimen after next 3-month survey cystoscopy  Leanne Berg MD

## 2022-11-11 ENCOUNTER — HOSPITAL ENCOUNTER (OUTPATIENT)
Dept: NEUROLOGY | Age: 62
Discharge: HOME | End: 2022-11-11
Payer: MEDICARE

## 2022-11-11 DIAGNOSIS — M54.16 LUMBAR RADICULOPATHY: ICD-10-CM

## 2022-11-11 PROCEDURE — 95886 MUSC TEST DONE W/N TEST COMP: CPT

## 2022-11-11 PROCEDURE — 95910 NRV CNDJ TEST 7-8 STUDIES: CPT

## 2022-11-11 NOTE — PROCEDURES
[address]                      [address], [address] [facility]                             ELECTROMYOGRAM REPORT    PATIENT NAME: Soni Aguirre                       :        1960  MED REC NO:   [id number]                            ROOM:  ACCOUNT NO:   [de-identified]                           ADMIT DATE: 2022  PROVIDER:     Risa Dahl MD    DATE OF EM2022    REFERRING PROVIDER:  Julian Bailey MD.    REASON FOR STUDY:  The patient had pain in the back with pain going to  her legs. She has a history of diabetes. FINDINGS:  Motor nerve conduction velocities are normal in all the  nerves tested. F-wave latencies are delayed in all the nerves tested. Distal motor latencies are normal in all the nerves tested. Distal sensory latency is delayed in the left sural nerve and could not  be obtained in other nerves tested. Amplitudes of motor and sensory responses are decreased in all the  nerves tested. On concentric needle electrode examination, mild denervation changes are  present in the L5 and S1 root distribution. CLINICAL INTERPRETATION:  Electromyographic studies are consistent with  the patient's clinical diagnosis of peripheral neuropathy. Probably,this is due to her diabetes mellitus type 2, non-insulin dependent. If  clinically indicated, we shall screen her for other causes of peripheral  neuropathy such as hypothyroidism, exposure to toxins, autoimmune  disorder, etc.    Changes of lumbar radiculopathy could be part of polyradicular  neuropathic process. The patient shall be continued on conservative management. If  clinically indicated, I may repeat the study in a year. Thank you Dr. Telma Jin for allowing me to see this patient. Please feel  free to call me if I can be of any further assistance regarding this  patient's evaluation.         Berry Harrington MD    D: 2022 16:21:41       T: 2022 16:25:49 [email or web address]  Job#: [id number]     Doc#: [id number]    CC:

## 2023-01-17 ENCOUNTER — PROCEDURE VISIT (OUTPATIENT)
Dept: UROLOGY | Age: 63
End: 2023-01-17
Payer: MEDICARE

## 2023-01-17 VITALS
DIASTOLIC BLOOD PRESSURE: 62 MMHG | OXYGEN SATURATION: 99 % | WEIGHT: 156 LBS | HEART RATE: 66 BPM | BODY MASS INDEX: 29.45 KG/M2 | HEIGHT: 61 IN | SYSTOLIC BLOOD PRESSURE: 116 MMHG

## 2023-01-17 DIAGNOSIS — Z85.51 HISTORY OF BLADDER CANCER: Primary | ICD-10-CM

## 2023-01-17 DIAGNOSIS — Z85.51 HISTORY OF BLADDER CANCER: ICD-10-CM

## 2023-01-17 PROCEDURE — 52000 CYSTOURETHROSCOPY: CPT | Performed by: UROLOGY

## 2023-01-17 PROCEDURE — 81003 URINALYSIS AUTO W/O SCOPE: CPT | Performed by: UROLOGY

## 2023-01-17 RX ORDER — NITROFURANTOIN 25; 75 MG/1; MG/1
100 CAPSULE ORAL ONCE
Qty: 1 CAPSULE | Refills: 0 | COMMUNITY
Start: 2023-01-17 | End: 2023-01-17

## 2023-01-17 NOTE — PROGRESS NOTES
Urology  History of superficial bladder cancer last cystoscopy 3 months ago showed no evidence of recurrent disease  Here for surveillance cystoscopy     Procedure note  Flexible cystoscope/local anesthetic/premedicated with antibiotic  Normal urethra  Normal bladder neck  Normal bladder mucosa  Clean jets of urine both ureters  No evidence of recurrent disease  Next surveillance cystoscopy will be done in 6 months  Micheline Bunch MD

## 2023-01-18 LAB — URINE CULTURE, ROUTINE: NORMAL

## 2023-04-17 SDOH — ECONOMIC STABILITY: FOOD INSECURITY: WITHIN THE PAST 12 MONTHS, YOU WORRIED THAT YOUR FOOD WOULD RUN OUT BEFORE YOU GOT MONEY TO BUY MORE.: PATIENT DECLINED

## 2023-04-17 SDOH — ECONOMIC STABILITY: TRANSPORTATION INSECURITY
IN THE PAST 12 MONTHS, HAS LACK OF TRANSPORTATION KEPT YOU FROM MEETINGS, WORK, OR FROM GETTING THINGS NEEDED FOR DAILY LIVING?: PATIENT DECLINED

## 2023-04-17 SDOH — ECONOMIC STABILITY: INCOME INSECURITY: HOW HARD IS IT FOR YOU TO PAY FOR THE VERY BASICS LIKE FOOD, HOUSING, MEDICAL CARE, AND HEATING?: PATIENT DECLINED

## 2023-04-17 SDOH — ECONOMIC STABILITY: FOOD INSECURITY: WITHIN THE PAST 12 MONTHS, THE FOOD YOU BOUGHT JUST DIDN'T LAST AND YOU DIDN'T HAVE MONEY TO GET MORE.: PATIENT DECLINED

## 2023-04-17 SDOH — ECONOMIC STABILITY: HOUSING INSECURITY
IN THE LAST 12 MONTHS, WAS THERE A TIME WHEN YOU DID NOT HAVE A STEADY PLACE TO SLEEP OR SLEPT IN A SHELTER (INCLUDING NOW)?: PATIENT REFUSED

## 2023-04-18 ENCOUNTER — OFFICE VISIT (OUTPATIENT)
Dept: PRIMARY CARE CLINIC | Age: 63
End: 2023-04-18
Payer: MEDICARE

## 2023-04-18 VITALS
OXYGEN SATURATION: 98 % | SYSTOLIC BLOOD PRESSURE: 142 MMHG | WEIGHT: 188 LBS | BODY MASS INDEX: 35.5 KG/M2 | HEART RATE: 84 BPM | HEIGHT: 61 IN | DIASTOLIC BLOOD PRESSURE: 86 MMHG

## 2023-04-18 DIAGNOSIS — Z00.00 MEDICARE ANNUAL WELLNESS VISIT, SUBSEQUENT: Primary | ICD-10-CM

## 2023-04-18 DIAGNOSIS — I77.1 ILIAC ARTERY STENOSIS, BILATERAL (HCC): ICD-10-CM

## 2023-04-18 DIAGNOSIS — I89.0 LYMPHEDEMA: ICD-10-CM

## 2023-04-18 DIAGNOSIS — C68.9 UROTHELIAL CARCINOMA (HCC): ICD-10-CM

## 2023-04-18 DIAGNOSIS — L03.115 BILATERAL CELLULITIS OF LOWER LEG: Primary | ICD-10-CM

## 2023-04-18 DIAGNOSIS — E66.01 SEVERE OBESITY (BMI 35.0-39.9) WITH COMORBIDITY (HCC): ICD-10-CM

## 2023-04-18 DIAGNOSIS — E11.9 TYPE 2 DIABETES MELLITUS WITHOUT COMPLICATION, WITHOUT LONG-TERM CURRENT USE OF INSULIN (HCC): ICD-10-CM

## 2023-04-18 DIAGNOSIS — D59.8 OTHER ACQUIRED HEMOLYTIC ANEMIAS (HCC): ICD-10-CM

## 2023-04-18 DIAGNOSIS — M79.605 LEG PAIN, LEFT: ICD-10-CM

## 2023-04-18 DIAGNOSIS — L03.116 BILATERAL CELLULITIS OF LOWER LEG: Primary | ICD-10-CM

## 2023-04-18 DIAGNOSIS — M79.604 LEG PAIN, RIGHT: ICD-10-CM

## 2023-04-18 PROCEDURE — 3046F HEMOGLOBIN A1C LEVEL >9.0%: CPT | Performed by: INTERNAL MEDICINE

## 2023-04-18 PROCEDURE — 1036F TOBACCO NON-USER: CPT | Performed by: INTERNAL MEDICINE

## 2023-04-18 PROCEDURE — G8417 CALC BMI ABV UP PARAM F/U: HCPCS | Performed by: INTERNAL MEDICINE

## 2023-04-18 PROCEDURE — 99214 OFFICE O/P EST MOD 30 MIN: CPT | Performed by: INTERNAL MEDICINE

## 2023-04-18 PROCEDURE — G0439 PPPS, SUBSEQ VISIT: HCPCS | Performed by: INTERNAL MEDICINE

## 2023-04-18 PROCEDURE — G8427 DOCREV CUR MEDS BY ELIG CLIN: HCPCS | Performed by: INTERNAL MEDICINE

## 2023-04-18 PROCEDURE — 3017F COLORECTAL CA SCREEN DOC REV: CPT | Performed by: INTERNAL MEDICINE

## 2023-04-18 PROCEDURE — 2022F DILAT RTA XM EVC RTNOPTHY: CPT | Performed by: INTERNAL MEDICINE

## 2023-04-18 PROCEDURE — 3074F SYST BP LT 130 MM HG: CPT | Performed by: INTERNAL MEDICINE

## 2023-04-18 PROCEDURE — 3078F DIAST BP <80 MM HG: CPT | Performed by: INTERNAL MEDICINE

## 2023-04-18 RX ORDER — LEVOFLOXACIN 500 MG/1
500 TABLET, FILM COATED ORAL DAILY
Qty: 10 TABLET | Refills: 0 | Status: SHIPPED | OUTPATIENT
Start: 2023-04-18 | End: 2023-04-28

## 2023-04-18 RX ORDER — POTASSIUM CHLORIDE 20 MEQ/1
TABLET, EXTENDED RELEASE ORAL
COMMUNITY
Start: 2023-03-30

## 2023-04-18 RX ORDER — CEPHALEXIN 500 MG/1
500 CAPSULE ORAL 4 TIMES DAILY
Qty: 40 CAPSULE | Refills: 0 | Status: SHIPPED | OUTPATIENT
Start: 2023-04-18

## 2023-04-18 RX ORDER — FUROSEMIDE 40 MG/1
TABLET ORAL
COMMUNITY
Start: 2023-03-30 | End: 2023-04-18 | Stop reason: SDUPTHER

## 2023-04-18 RX ORDER — FUROSEMIDE 40 MG/1
80 TABLET ORAL DAILY
Qty: 60 TABLET | Refills: 2 | Status: SHIPPED | OUTPATIENT
Start: 2023-04-18

## 2023-04-18 RX ORDER — ATORVASTATIN CALCIUM 80 MG/1
80 TABLET, FILM COATED ORAL NIGHTLY
COMMUNITY
Start: 2023-02-27

## 2023-04-18 SDOH — ECONOMIC STABILITY: INCOME INSECURITY: HOW HARD IS IT FOR YOU TO PAY FOR THE VERY BASICS LIKE FOOD, HOUSING, MEDICAL CARE, AND HEATING?: NOT HARD AT ALL

## 2023-04-18 SDOH — ECONOMIC STABILITY: FOOD INSECURITY: WITHIN THE PAST 12 MONTHS, YOU WORRIED THAT YOUR FOOD WOULD RUN OUT BEFORE YOU GOT MONEY TO BUY MORE.: NEVER TRUE

## 2023-04-18 SDOH — ECONOMIC STABILITY: HOUSING INSECURITY
IN THE LAST 12 MONTHS, WAS THERE A TIME WHEN YOU DID NOT HAVE A STEADY PLACE TO SLEEP OR SLEPT IN A SHELTER (INCLUDING NOW)?: NO

## 2023-04-18 SDOH — ECONOMIC STABILITY: FOOD INSECURITY: WITHIN THE PAST 12 MONTHS, THE FOOD YOU BOUGHT JUST DIDN'T LAST AND YOU DIDN'T HAVE MONEY TO GET MORE.: NEVER TRUE

## 2023-04-18 ASSESSMENT — PATIENT HEALTH QUESTIONNAIRE - PHQ9
SUM OF ALL RESPONSES TO PHQ QUESTIONS 1-9: 0
3. TROUBLE FALLING OR STAYING ASLEEP: 0
1. LITTLE INTEREST OR PLEASURE IN DOING THINGS: 0
2. FEELING DOWN, DEPRESSED OR HOPELESS: 0
4. FEELING TIRED OR HAVING LITTLE ENERGY: 0
SUM OF ALL RESPONSES TO PHQ QUESTIONS 1-9: 0
7. TROUBLE CONCENTRATING ON THINGS, SUCH AS READING THE NEWSPAPER OR WATCHING TELEVISION: 0
10. IF YOU CHECKED OFF ANY PROBLEMS, HOW DIFFICULT HAVE THESE PROBLEMS MADE IT FOR YOU TO DO YOUR WORK, TAKE CARE OF THINGS AT HOME, OR GET ALONG WITH OTHER PEOPLE: 0
SUM OF ALL RESPONSES TO PHQ QUESTIONS 1-9: 0
5. POOR APPETITE OR OVEREATING: 0
9. THOUGHTS THAT YOU WOULD BE BETTER OFF DEAD, OR OF HURTING YOURSELF: 0
SUM OF ALL RESPONSES TO PHQ QUESTIONS 1-9: 0
6. FEELING BAD ABOUT YOURSELF - OR THAT YOU ARE A FAILURE OR HAVE LET YOURSELF OR YOUR FAMILY DOWN: 0
8. MOVING OR SPEAKING SO SLOWLY THAT OTHER PEOPLE COULD HAVE NOTICED. OR THE OPPOSITE, BEING SO FIGETY OR RESTLESS THAT YOU HAVE BEEN MOVING AROUND A LOT MORE THAN USUAL: 0
SUM OF ALL RESPONSES TO PHQ9 QUESTIONS 1 & 2: 0

## 2023-04-19 ENCOUNTER — HOSPITAL ENCOUNTER (OUTPATIENT)
Dept: ULTRASOUND IMAGING | Age: 63
Discharge: HOME | End: 2023-04-21
Payer: MEDICARE

## 2023-04-19 DIAGNOSIS — I77.1 ILIAC ARTERY STENOSIS, BILATERAL (HCC): ICD-10-CM

## 2023-04-19 DIAGNOSIS — M79.605 LEG PAIN, LEFT: ICD-10-CM

## 2023-04-19 DIAGNOSIS — M79.604 LEG PAIN, RIGHT: ICD-10-CM

## 2023-04-19 PROCEDURE — 93970 EXTREMITY STUDY: CPT

## 2023-04-19 PROCEDURE — 93925 LOWER EXTREMITY STUDY: CPT

## 2023-04-24 ASSESSMENT — ENCOUNTER SYMPTOMS
FACIAL SWELLING: 0
APNEA: 0
CHOKING: 0
ABDOMINAL DISTENTION: 0
ABDOMINAL DISTENTION: 0
CHOKING: 0
FACIAL SWELLING: 0
BLOOD IN STOOL: 0
BLOOD IN STOOL: 0
APNEA: 0
PHOTOPHOBIA: 0
PHOTOPHOBIA: 0

## 2023-04-25 NOTE — PROGRESS NOTES
Cathyjimmy Tripp 58 y.o. female presents today with No chief complaint on file. HPIannual wellness visit    Past Medical History:   Diagnosis Date    Anemia     she sees hematology    Anxiety     Back pain with right-sided radiculopathy     Bladder CA in situ 2022    Chronic left shoulder pain     Depression     Fibromyalgia     Hepatitis     Hyperlipidemia     Hypertension     Osteoarthritis     SHOULDER    Restless legs syndrome     Type II or unspecified type diabetes mellitus without mention of complication, not stated as uncontrolled      Patient Active Problem List    Diagnosis Date Noted    Urothelial carcinoma ([address] 04/18/2023    Severe obesity (BMI 35.0-39. 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    Iron deficiency anemia due to dietary causes 01/22/2019    Other acquired hemolytic anemias ([address] 01/22/2019    Low back pain 05/20/2015    DDD (degenerative disc disease), lumbar 05/20/2015    Ulnar neuropathy 05/20/2015    Depressive disorder, not elsewhere classified 05/18/2012    Essential hypertension, benign 05/18/2012    Type 2 diabetes mellitus without complication, without long-term current use of insulin ([facility] 10/04/2011     Past Surgical History:   Procedure Laterality Date    APPENDECTOMY  09/24/2022    [address] [place] [address]    BLADDER SURGERY Right 01/12/2022    CYSTOSCOPY RIGHT  RETROGRADE PYELOGRAM RIGHT DOUBLE J STENT performed by Blake De La Cruz MD at [facility] Right 01/12/2022    TRANSURETHRAL RESECTION OF  BLADDER TUMOR G.E.T. WITH MUSCLE RELAXATION [facility] ([address] [place] [address])      [facility]  03/02/2011    ROTATOR CUFF REPAIR Left 10/2021    TONSILLECTOMY AND ADENOIDECTOMY       Family History   Problem Relation Age of Onset    Diabetes Mother     Diabetes Father     Heart Attack Father     Heart Disease Father     Diabetes Brother     Depression Daughter      Social

## 2023-04-25 NOTE — PATIENT INSTRUCTIONS
disease. Your doctor has found that you have a chance of having heart disease. You can do lots of things to keep your heart healthy. It may not be easy, but you can change your diet, exercise more, and quit smoking. These steps really work to lower your chance of heart disease. Follow-up care is a key part of your treatment and safety. Be sure to make and go to all appointments, and call your doctor if you are having problems. It's also a good idea to know your test results and keep a list of the medicines you take. How can you care for yourself at home? Diet    Use less salt when you cook and eat. This helps lower your blood pressure. Taste food before salting. Add only a little salt when you think you need it. With time, your taste buds will adjust to less salt.     Eat fewer snack items, fast foods, canned soups, and other high-salt, high-fat, processed foods.     Read food labels and try to avoid saturated and trans fats. They increase your risk of heart disease by raising cholesterol levels.     Limit the amount of solid fat-butter, margarine, and shortening-you eat. Use olive, peanut, or canola oil when you cook. Bake, broil, and steam foods instead of frying them.     Eat a variety of fruit and vegetables every day. Dark green, deep orange, red, or yellow fruits and vegetables are especially good for you. Examples include spinach, carrots, peaches, and berries.     Foods high in fiber can reduce your cholesterol and provide important vitamins and minerals. High-fiber foods include whole-grain cereals and breads, oatmeal, beans, brown rice, citrus fruits, and apples.     Eat lean proteins. Heart-healthy proteins include seafood, lean meats and poultry, eggs, beans, peas, nuts, seeds, and soy products.     Limit drinks and foods with added sugar. These include candy, desserts, and soda pop. Lifestyle changes    If your doctor recommends it, get more exercise. Walking is a good choice.  Bit by bit, increase

## 2023-05-01 LAB
ALANINE AMINOTRANSFERASE (SGPT) (U/L) IN SER/PLAS: 23 U/L (ref 7–45)
ALBUMIN (G/DL) IN SER/PLAS: 4.7 G/DL (ref 3.4–5)
ALBUMIN: 4.7 G/DL (ref 3.4–5)
ALKALINE PHOSPHATASE (U/L) IN SER/PLAS: 104 U/L (ref 33–136)
ALP BLD-CCNC: 104 U/L (ref 33–136)
ALT SERPL-CCNC: 23 U/L (ref 7–45)
ANION GAP IN SER/PLAS: 11 MMOL/L (ref 10–20)
ANION GAP SERPL CALCULATED.3IONS-SCNC: 11 MMOL/L (ref 10–20)
ASPARTATE AMINOTRANSFERASE (SGOT) (U/L) IN SER/PLAS: 17 U/L (ref 9–39)
AST SERPL-CCNC: 17 U/L (ref 9–39)
BICARBONATE: 22 MMOL/L (ref 21–32)
BILIRUB SERPL-MCNC: 0.4 MG/DL (ref 0–1.2)
BILIRUBIN TOTAL (MG/DL) IN SER/PLAS: 0.4 MG/DL (ref 0–1.2)
C REACTIVE PROTEIN (MG/L) IN SER/PLAS: <0.1 MG/DL
CALCIUM (MG/DL) IN SER/PLAS: 9.7 MG/DL (ref 8.6–10.3)
CALCIUM SERPL-MCNC: 9.7 MG/DL (ref 8.6–10.3)
CARBON DIOXIDE, TOTAL (MMOL/L) IN SER/PLAS: 22 MMOL/L (ref 21–32)
CHLORIDE (MMOL/L) IN SER/PLAS: 108 MMOL/L (ref 98–107)
CHLORIDE BLD-SCNC: 108 MMOL/L (ref 98–107)
CREAT SERPL-MCNC: 0.79 MG/DL (ref 0.5–1.05)
CREATININE (MG/DL) IN SER/PLAS: 0.79 MG/DL (ref 0.5–1.05)
EGFR FEMALE: 84 ML/MIN/1.73M2
GFR FEMALE: 84 ML/MIN/1.73M2
GLUCOSE (MG/DL) IN SER/PLAS: 144 MG/DL (ref 74–99)
GLUCOSE: 144 MG/DL (ref 74–99)
POTASSIUM (MMOL/L) IN SER/PLAS: 4.4 MMOL/L (ref 3.5–5.3)
POTASSIUM SERPL-SCNC: 4.4 MMOL/L (ref 3.5–5.3)
PROTEIN TOTAL: 7.6 G/DL (ref 6.4–8.2)
SEDIMENTATION RATE, ERYTHROCYTE: 5 MM/H (ref 0–30)
SODIUM (MMOL/L) IN SER/PLAS: 137 MMOL/L (ref 136–145)
SODIUM BLD-SCNC: 137 MMOL/L (ref 136–145)
TOTAL PROTEIN: 7.6 G/DL (ref 6.4–8.2)
UREA NITROGEN (MG/DL) IN SER/PLAS: 11 MG/DL (ref 6–23)
UREA NITROGEN: 11 MG/DL (ref 6–23)

## 2023-05-03 LAB — ANTI-NUCLEAR ANTIBODY (ANA): NEGATIVE

## 2023-05-04 LAB
ALBUMIN ELP: 4.5 G/DL (ref 3.4–5)
ALPHA 1: 0.3 G/DL (ref 0.2–0.6)
ALPHA 2: 0.9 G/DL (ref 0.4–1.1)
BETA: 0.9 G/DL (ref 0.5–1.2)
GAMMA GLOBULIN: 1 G/DL (ref 0.5–1.4)
PATH REVIEW - SERUM IMMUNOFIXATION: NORMAL
PATH REVIEW-SERUM PROTEIN ELECTROPHORESIS: NORMAL
PROTEIN ELECTROPHORESIS INTERPRETATION: NORMAL
PROTEIN TOTAL: 7.6 G/DL (ref 6.4–8.2)
SERUM IMMUNOFIXATION INTERPRETATION: NORMAL

## 2023-07-17 ENCOUNTER — OFFICE VISIT (OUTPATIENT)
Dept: UROLOGY | Age: 63
End: 2023-07-17
Payer: MEDICARE

## 2023-07-17 VITALS
HEART RATE: 65 BPM | DIASTOLIC BLOOD PRESSURE: 70 MMHG | WEIGHT: 160 LBS | SYSTOLIC BLOOD PRESSURE: 124 MMHG | BODY MASS INDEX: 30.21 KG/M2 | HEIGHT: 61 IN

## 2023-07-17 DIAGNOSIS — Z85.51 HISTORY OF BLADDER CANCER: Primary | ICD-10-CM

## 2023-07-17 PROCEDURE — 3074F SYST BP LT 130 MM HG: CPT | Performed by: PHYSICIAN ASSISTANT

## 2023-07-17 PROCEDURE — 3078F DIAST BP <80 MM HG: CPT | Performed by: PHYSICIAN ASSISTANT

## 2023-07-17 PROCEDURE — G8417 CALC BMI ABV UP PARAM F/U: HCPCS | Performed by: PHYSICIAN ASSISTANT

## 2023-07-17 PROCEDURE — G8427 DOCREV CUR MEDS BY ELIG CLIN: HCPCS | Performed by: PHYSICIAN ASSISTANT

## 2023-07-17 PROCEDURE — 3017F COLORECTAL CA SCREEN DOC REV: CPT | Performed by: PHYSICIAN ASSISTANT

## 2023-07-17 PROCEDURE — 99213 OFFICE O/P EST LOW 20 MIN: CPT | Performed by: PHYSICIAN ASSISTANT

## 2023-07-17 PROCEDURE — 1036F TOBACCO NON-USER: CPT | Performed by: PHYSICIAN ASSISTANT

## 2023-07-17 ASSESSMENT — ENCOUNTER SYMPTOMS: APNEA: 0

## 2023-08-14 ENCOUNTER — PROCEDURE VISIT (OUTPATIENT)
Dept: UROLOGY | Age: 63
End: 2023-08-14
Payer: MEDICARE

## 2023-08-14 VITALS
HEART RATE: 65 BPM | HEIGHT: 60 IN | WEIGHT: 168 LBS | DIASTOLIC BLOOD PRESSURE: 70 MMHG | BODY MASS INDEX: 32.98 KG/M2 | SYSTOLIC BLOOD PRESSURE: 138 MMHG

## 2023-08-14 DIAGNOSIS — Z85.51 HISTORY OF BLADDER CANCER: Primary | ICD-10-CM

## 2023-08-14 LAB
BILIRUBIN, POC: ABNORMAL
BLOOD URINE, POC: ABNORMAL
CLARITY, POC: CLEAR
COLOR, POC: YELLOW
GLUCOSE URINE, POC: ABNORMAL
KETONES, POC: ABNORMAL
LEUKOCYTE EST, POC: ABNORMAL
NITRITE, POC: ABNORMAL
PH, POC: 5.5
PROTEIN, POC: ABNORMAL
SPECIFIC GRAVITY, POC: >1.03
UROBILINOGEN, POC: 0.2

## 2023-08-14 PROCEDURE — 52000 CYSTOURETHROSCOPY: CPT | Performed by: UROLOGY

## 2023-08-14 RX ORDER — NITROFURANTOIN 25; 75 MG/1; MG/1
100 CAPSULE ORAL ONCE
Qty: 1 CAPSULE | Refills: 0 | Status: SHIPPED | COMMUNITY
Start: 2023-08-14 | End: 2023-08-14

## 2023-08-14 ASSESSMENT — ENCOUNTER SYMPTOMS: ABDOMINAL PAIN: 0

## 2023-08-14 NOTE — PROGRESS NOTES
Subjective:      Patient ID: Alexis Ayala is a 58 y.o. female    HPI  This is a 57 yo female with h/o Anxiety, CBP, Fibromyalgia, HTN, DM, CAD with stents on Plavix, and prior h/o Ta low grade UC of bladder diagnosed in 1/22 and back for surveillance cystoscopy. She is former patient of Dr Denisa Meraz. I reviewed the prior records today and she had 6 weeks of BCG after the initial diagnosis in 2022. She has had interval cystoscopy showing no recurrences since that time and is now on 6 mo regimen (last 1/23 and neg). She has no interval  complaints and no hematuria or dysuria. She wants to proceed with cystoscopy today.      Past Medical History:   Diagnosis Date    Anemia     she sees hematology    Anxiety     Back pain with right-sided radiculopathy     Bladder CA in situ 2022    Chronic left shoulder pain     Depression     Fibromyalgia     Hepatitis     Hyperlipidemia     Hypertension     Osteoarthritis     SHOULDER    Restless legs syndrome     Type II or unspecified type diabetes mellitus without mention of complication, not stated as uncontrolled      Past Surgical History:   Procedure Laterality Date    APPENDECTOMY  09/24/2022    [id number] -     BLADDER SURGERY Right 01/12/2022    CYSTOSCOPY RIGHT  RETROGRADE PYELOGRAM RIGHT DOUBLE J STENT performed by Benito Rosa MD at [address] [place] [address] Right 01/12/2022    TRANSURETHRAL RESECTION OF  BLADDER TUMOR G.E.T. WITH MUSCLE RELAXATION performed by Benito Rosa MD at [facility] ([address] [place] [address])      [address] [place] [address]  03/02/2011    ROTATOR CUFF REPAIR Left 10/2021    TONSILLECTOMY AND ADENOIDECTOMY       Social History     Socioeconomic History    Marital status:      Spouse name: None    Number of children: None    Years of education: None    Highest education level: None   Tobacco Use    Smoking status: Former     Packs/day: 0.05     Years: 20.00     Pack years: 1.00     Types: Cigarettes     Quit

## 2023-09-11 ENCOUNTER — HOSPITAL ENCOUNTER (OUTPATIENT)
Dept: DATA CONVERSION | Facility: HOSPITAL | Age: 63
End: 2023-09-11
Attending: ANESTHESIOLOGY | Admitting: ANESTHESIOLOGY
Payer: MEDICARE

## 2023-09-11 DIAGNOSIS — M47.816 SPONDYLOSIS WITHOUT MYELOPATHY OR RADICULOPATHY, LUMBAR REGION: ICD-10-CM

## 2023-09-11 DIAGNOSIS — M47.896 OTHER SPONDYLOSIS, LUMBAR REGION: ICD-10-CM

## 2023-09-11 DIAGNOSIS — M47.817 SPONDYLOSIS WITHOUT MYELOPATHY OR RADICULOPATHY, LUMBOSACRAL REGION: ICD-10-CM

## 2023-10-01 NOTE — OP NOTE
Post Operative Note:     PreOp Diagnosis: Lumbar spinal spondylosis with facet  arthritis bilateral L4-5 and L5-S1   Post-Procedure Diagnosis: Same as preop   Procedure: 1.  Right L4-5 facet steroid injection  under fluoroscopy  2.  Right L5-S1 facet steroid injection under fluoroscopy  3.  Left L4-5 facet steroid injection under fluoroscopy  4.  Left L5-S1 steroid facet injection under fluoroscopy  5.   Surgeon: Shell Mayo MD   Resident/Fellow/Other Assistant: None   Anesthesia: Local   Estimated Blood Loss (mL): none   Specimen: no   Findings: Bilateral L4-5 and L5-S1 facets     Operative Report Dictated:  Dictation: not applicable - note contains Operative  Report   Operative Report:    Ms. Laguerre is a pleasant 62 years old lady.  She is suffered severe back pain with severe degenerative disc disease lumbar region.  She has bilateral facet hypertrophy  with arthritis and spondylosis.  Conservative treatment failed to give her any pain relief.  I referred her to a surgeon who recommended also bilateral L4-5 and L5-S1 facet injection.  I discussed with the patient risks and benefits of the procedure and  the patient agreed to proceed.    Procedure description    The patient was taken to the operating room and placed on prone position.  All pressure points were protected.  Basic ASA monitors were applied.  Timeout on the huddle were obtained.  The skin was prepped and draped using Betadine and Hibiclens.  Under  fluoroscopy the left L4-5 and L5 facets were identified.  The skin and subcutaneous tissue were infiltrated over the area with 1% preservative-free lidocaine.  A 22 spinal needle with a curved was introduced to the level of the left L4-5 facet.  Another  1 was advanced it on the same p.m. to L5-S1 facet joint.  Aspiration was negative for blood or CSF.  The patient is allergic to contrast no contrast was injected.  Each facet joint received 1.5 cc of 0.5% preservative-free bupivacaine and 2.5 mg  preservative-free  dexamethasone.  The needles were removed intact.  The procedure was repeated on the right side.    Total number of injections were 4    Blood loss none    Immediate complications none    The patient tolerated the procedure very well and went to phase 2 recovery in stable condition.      Electronic Signatures:  Shell Mayo)  (Signed 11-Sep-2023 14:34)   Authored: Post Operative Note, Note Completion      Last Updated: 11-Sep-2023 14:34 by Shell Mayo)

## 2023-10-03 ENCOUNTER — TELEPHONE (OUTPATIENT)
Dept: CARDIOLOGY | Facility: CLINIC | Age: 63
End: 2023-10-03
Payer: MEDICARE

## 2023-10-03 NOTE — TELEPHONE ENCOUNTER
Pt. Called stating she has been on Spironolactone since her appt. With Dr. Bernard on 9/27/23. Pt. C/o swelling in her legs. Pt. Has Lasix PRN but has not taken it as she cannot take Potassium. Is pt. Able to take Lasix?

## 2023-10-04 ENCOUNTER — LAB (OUTPATIENT)
Dept: LAB | Facility: LAB | Age: 63
End: 2023-10-04
Payer: MEDICARE

## 2023-10-04 DIAGNOSIS — I10 ESSENTIAL (PRIMARY) HYPERTENSION: Primary | ICD-10-CM

## 2023-10-04 DIAGNOSIS — I10 ESSENTIAL (PRIMARY) HYPERTENSION: ICD-10-CM

## 2023-10-04 LAB
ALBUMIN SERPL BCP-MCNC: 4.2 G/DL (ref 3.4–5)
ANION GAP SERPL CALC-SCNC: 10 MMOL/L (ref 10–20)
BUN SERPL-MCNC: 8 MG/DL (ref 6–23)
CALCIUM SERPL-MCNC: 9.5 MG/DL (ref 8.6–10.3)
CHLORIDE SERPL-SCNC: 105 MMOL/L (ref 98–107)
CO2 SERPL-SCNC: 26 MMOL/L (ref 21–32)
CREAT SERPL-MCNC: 0.74 MG/DL (ref 0.5–1.05)
GFR SERPL CREATININE-BSD FRML MDRD: >90 ML/MIN/1.73M*2
GLUCOSE SERPL-MCNC: 120 MG/DL (ref 74–99)
PHOSPHATE SERPL-MCNC: 3.9 MG/DL (ref 2.5–4.9)
POTASSIUM SERPL-SCNC: 5 MMOL/L (ref 3.5–5.3)
SODIUM SERPL-SCNC: 136 MMOL/L (ref 136–145)

## 2023-10-04 PROCEDURE — 36415 COLL VENOUS BLD VENIPUNCTURE: CPT

## 2023-10-04 PROCEDURE — 80069 RENAL FUNCTION PANEL: CPT

## 2023-10-04 NOTE — TELEPHONE ENCOUNTER
10/4  Call returned to patient and left voice mail message requesting return call for recommendations.    10/5/23  Spoke with pt. Who will take Furosemide 40mg once a day for the next few days, and she will go get her labs drawn on Tuesday next week.  Order placed for renal profile per physician orders here.  Pt. Also asked if she needs parameters about holding her Metoprolol for HR under 60 while she is taking diuretic.  Advised note will be reviewed with physician.  Shubham

## 2023-10-04 NOTE — TELEPHONE ENCOUNTER
Jj Bernard MD  You15 hours ago (3:40 PM)       Yes, if she is going to take it for several days in a row she should have a renal profile performed to check on her potassium

## 2023-10-05 DIAGNOSIS — R60.0 LEG EDEMA: Primary | ICD-10-CM

## 2023-10-05 DIAGNOSIS — I50.9 CONGESTIVE HEART FAILURE, UNSPECIFIED HF CHRONICITY, UNSPECIFIED HEART FAILURE TYPE (MULTI): ICD-10-CM

## 2023-10-05 NOTE — TELEPHONE ENCOUNTER
10/5  Per Dr. Jj Bernard no parameters are necessary for Metoprolol while taking diuretic.  Call returned to patient and advised.  Patient verbalized understanding.

## 2023-10-06 ENCOUNTER — TELEPHONE (OUTPATIENT)
Dept: CARDIOLOGY | Facility: CLINIC | Age: 63
End: 2023-10-06
Payer: MEDICARE

## 2023-10-09 ENCOUNTER — LAB (OUTPATIENT)
Dept: LAB | Facility: LAB | Age: 63
End: 2023-10-09
Payer: MEDICARE

## 2023-10-09 ENCOUNTER — HOSPITAL ENCOUNTER (OUTPATIENT)
Dept: CARDIOLOGY | Facility: CLINIC | Age: 63
Discharge: HOME | End: 2023-10-09
Payer: MEDICARE

## 2023-10-09 DIAGNOSIS — R60.0 LEG EDEMA: ICD-10-CM

## 2023-10-09 DIAGNOSIS — I10 ESSENTIAL (PRIMARY) HYPERTENSION: ICD-10-CM

## 2023-10-09 LAB
ALBUMIN SERPL BCP-MCNC: 4.3 G/DL (ref 3.4–5)
ANION GAP SERPL CALC-SCNC: 11 MMOL/L (ref 10–20)
BUN SERPL-MCNC: 11 MG/DL (ref 6–23)
CALCIUM SERPL-MCNC: 9.8 MG/DL (ref 8.6–10.3)
CHLORIDE SERPL-SCNC: 104 MMOL/L (ref 98–107)
CO2 SERPL-SCNC: 25 MMOL/L (ref 21–32)
CREAT SERPL-MCNC: 0.78 MG/DL (ref 0.5–1.05)
GFR SERPL CREATININE-BSD FRML MDRD: 86 ML/MIN/1.73M*2
GLUCOSE SERPL-MCNC: 154 MG/DL (ref 74–99)
PHOSPHATE SERPL-MCNC: 4 MG/DL (ref 2.5–4.9)
POTASSIUM SERPL-SCNC: 4.1 MMOL/L (ref 3.5–5.3)
SODIUM SERPL-SCNC: 136 MMOL/L (ref 136–145)

## 2023-10-09 PROCEDURE — 36415 COLL VENOUS BLD VENIPUNCTURE: CPT

## 2023-10-09 PROCEDURE — 80069 RENAL FUNCTION PANEL: CPT

## 2023-10-09 PROCEDURE — 93975 VASCULAR STUDY: CPT | Performed by: INTERNAL MEDICINE

## 2023-10-19 PROBLEM — C68.9 UROTHELIAL CARCINOMA (MULTI): Status: ACTIVE | Noted: 2023-04-18

## 2023-10-19 PROBLEM — E78.00 HYPERCHOLESTEROLEMIA: Status: ACTIVE | Noted: 2023-10-19

## 2023-10-19 PROBLEM — S46.219A BICEPS TENDON TEAR: Status: ACTIVE | Noted: 2023-10-19

## 2023-10-19 PROBLEM — M76.30 ILIOTIBIAL BAND SYNDROME: Status: ACTIVE | Noted: 2023-10-19

## 2023-10-19 PROBLEM — M75.02 ADHESIVE CAPSULITIS OF LEFT SHOULDER: Status: ACTIVE | Noted: 2023-10-19

## 2023-10-19 PROBLEM — S46.212A TEAR OF LEFT BICEPS MUSCLE: Status: ACTIVE | Noted: 2023-10-19

## 2023-10-19 PROBLEM — R52: Status: ACTIVE | Noted: 2023-10-19

## 2023-10-19 PROBLEM — E66.9 CLASS 1 OBESITY WITH BODY MASS INDEX (BMI) OF 33.0 TO 33.9 IN ADULT: Status: ACTIVE | Noted: 2023-10-19

## 2023-10-19 PROBLEM — D64.9 ANEMIA: Status: ACTIVE | Noted: 2023-10-19

## 2023-10-19 PROBLEM — M75.100 ROTATOR CUFF TEAR: Status: ACTIVE | Noted: 2023-10-19

## 2023-10-19 PROBLEM — E66.811 CLASS 1 OBESITY WITH BODY MASS INDEX (BMI) OF 33.0 TO 33.9 IN ADULT: Status: ACTIVE | Noted: 2023-10-19

## 2023-10-19 PROBLEM — R74.8 ELEVATED LIVER ENZYMES: Status: ACTIVE | Noted: 2023-10-19

## 2023-10-19 PROBLEM — R20.2 NUMBNESS AND TINGLING: Status: ACTIVE | Noted: 2023-10-19

## 2023-10-19 PROBLEM — D64.9 LOW HEMOGLOBIN: Status: ACTIVE | Noted: 2023-10-19

## 2023-10-19 PROBLEM — I25.10 CAD S/P PERCUTANEOUS CORONARY ANGIOPLASTY: Status: ACTIVE | Noted: 2023-10-19

## 2023-10-19 PROBLEM — M79.643 HAND PAIN: Status: ACTIVE | Noted: 2023-10-19

## 2023-10-19 PROBLEM — M25.612 STIFFNESS OF LEFT SHOULDER, NOT ELSEWHERE CLASSIFIED: Status: ACTIVE | Noted: 2023-10-19

## 2023-10-19 PROBLEM — S30.1XXA HEMATOMA OF GROIN: Status: ACTIVE | Noted: 2023-10-19

## 2023-10-19 PROBLEM — M19.90 GENERALIZED ARTHRITIS: Status: ACTIVE | Noted: 2023-10-19

## 2023-10-19 PROBLEM — E55.9 VITAMIN D DEFICIENCY: Status: ACTIVE | Noted: 2023-10-19

## 2023-10-19 PROBLEM — L24.A9 WOUND DRAINAGE: Status: ACTIVE | Noted: 2023-10-19

## 2023-10-19 PROBLEM — I25.2 HISTORY OF MYOCARDIAL INFARCTION: Status: ACTIVE | Noted: 2023-10-19

## 2023-10-19 PROBLEM — R07.9 CHEST PAIN: Status: ACTIVE | Noted: 2023-10-19

## 2023-10-19 PROBLEM — K55.20 AVM (ARTERIOVENOUS MALFORMATION) OF SMALL BOWEL, ACQUIRED: Status: ACTIVE | Noted: 2023-10-19

## 2023-10-19 PROBLEM — R52 PAIN: Status: ACTIVE | Noted: 2023-10-19

## 2023-10-19 PROBLEM — D50.0 BLOOD LOSS ANEMIA: Status: ACTIVE | Noted: 2023-10-19

## 2023-10-19 PROBLEM — K90.9 MALABSORPTION OF IRON (HHS-HCC): Status: ACTIVE | Noted: 2019-02-19

## 2023-10-19 PROBLEM — D50.9 IRON DEFICIENCY ANEMIA: Status: ACTIVE | Noted: 2023-10-19

## 2023-10-19 PROBLEM — D50.8 IRON DEFICIENCY ANEMIA DUE TO DIETARY CAUSES: Status: ACTIVE | Noted: 2019-01-22

## 2023-10-19 PROBLEM — E78.2 MIXED HYPERLIPIDEMIA: Status: ACTIVE | Noted: 2023-10-19

## 2023-10-19 PROBLEM — M25.419 SWELLING OF SHOULDER JOINT: Status: ACTIVE | Noted: 2023-10-19

## 2023-10-19 PROBLEM — I25.119 CORONARY ARTERY DISEASE WITH ANGINA PECTORIS (CMS-HCC): Status: ACTIVE | Noted: 2023-10-19

## 2023-10-19 PROBLEM — E11.9 DIABETES MELLITUS (MULTI): Status: ACTIVE | Noted: 2023-10-19

## 2023-10-19 PROBLEM — D59.8: Status: ACTIVE | Noted: 2019-01-22

## 2023-10-19 PROBLEM — F32.9 MAJOR DEPRESSION: Status: ACTIVE | Noted: 2023-10-19

## 2023-10-19 PROBLEM — E66.01 SEVERE OBESITY (BMI 35.0-39.9) WITH COMORBIDITY (MULTI): Status: ACTIVE | Noted: 2023-04-18

## 2023-10-19 PROBLEM — I51.9 HEART DISEASE: Status: ACTIVE | Noted: 2023-10-19

## 2023-10-19 PROBLEM — Z85.51 HISTORY OF BLADDER CANCER: Status: ACTIVE | Noted: 2023-07-17

## 2023-10-19 PROBLEM — G56.20 CUBITAL TUNNEL SYNDROME: Status: ACTIVE | Noted: 2023-10-19

## 2023-10-19 PROBLEM — Z98.61 CAD S/P PERCUTANEOUS CORONARY ANGIOPLASTY: Status: ACTIVE | Noted: 2023-10-19

## 2023-10-19 PROBLEM — R20.0 NUMBNESS AND TINGLING: Status: ACTIVE | Noted: 2023-10-19

## 2023-10-19 PROBLEM — I25.2 H/O NON-ST ELEVATION MYOCARDIAL INFARCTION (NSTEMI): Status: ACTIVE | Noted: 2023-10-19

## 2023-10-19 PROBLEM — M75.42 IMPINGEMENT SYNDROME OF LEFT SHOULDER: Status: ACTIVE | Noted: 2023-10-19

## 2023-10-19 PROBLEM — R09.89 CAROTID ARTERY BRUIT: Status: ACTIVE | Noted: 2023-10-19

## 2023-10-19 PROBLEM — R29.898 BILATERAL LEG WEAKNESS: Status: ACTIVE | Noted: 2023-10-19

## 2023-10-19 PROBLEM — E66.9 OBESE: Status: ACTIVE | Noted: 2023-10-19

## 2023-10-19 PROBLEM — R00.2 PALPITATIONS: Status: ACTIVE | Noted: 2023-10-19

## 2023-10-19 PROBLEM — S43.439A SUPERIOR GLENOID LABRUM LESION OF SHOULDER: Status: ACTIVE | Noted: 2023-10-19

## 2023-10-19 PROBLEM — G47.00 INSOMNIA: Status: ACTIVE | Noted: 2023-10-19

## 2023-10-19 PROBLEM — M62.838 MUSCLE SPASM: Status: ACTIVE | Noted: 2023-10-19

## 2023-10-19 PROBLEM — I87.2 CHRONIC VENOUS INSUFFICIENCY: Status: ACTIVE | Noted: 2023-10-19

## 2023-10-19 PROBLEM — F41.9 ANXIETY: Status: ACTIVE | Noted: 2023-10-19

## 2023-10-19 PROBLEM — M79.7 FIBROMYALGIA: Status: ACTIVE | Noted: 2023-10-19

## 2023-10-19 PROBLEM — R60.9 EDEMA: Status: ACTIVE | Noted: 2023-10-19

## 2023-10-19 PROBLEM — Z98.890 H/O COLONOSCOPY: Status: ACTIVE | Noted: 2023-10-19

## 2023-10-19 RX ORDER — AMITRIPTYLINE HYDROCHLORIDE 25 MG/1
1 TABLET, FILM COATED ORAL NIGHTLY
COMMUNITY

## 2023-10-19 RX ORDER — ACARBOSE 25 MG/1
TABLET ORAL
COMMUNITY
Start: 2021-09-30

## 2023-10-19 RX ORDER — SPIRONOLACTONE 25 MG/1
TABLET ORAL
COMMUNITY
Start: 2023-09-27 | End: 2023-10-20 | Stop reason: SDUPTHER

## 2023-10-19 RX ORDER — POTASSIUM CHLORIDE 20 MEQ/1
TABLET, EXTENDED RELEASE ORAL
COMMUNITY
Start: 2022-01-10

## 2023-10-19 RX ORDER — ATORVASTATIN CALCIUM 40 MG/1
TABLET, FILM COATED ORAL
COMMUNITY
End: 2023-10-20 | Stop reason: ALTCHOICE

## 2023-10-19 RX ORDER — NAPROXEN SODIUM 220 MG/1
TABLET, FILM COATED ORAL
COMMUNITY
Start: 2023-01-31 | End: 2023-10-20 | Stop reason: ALTCHOICE

## 2023-10-19 RX ORDER — NITROGLYCERIN 0.4 MG/1
TABLET SUBLINGUAL
COMMUNITY

## 2023-10-19 RX ORDER — TIZANIDINE 4 MG/1
4 TABLET ORAL EVERY 8 HOURS PRN
COMMUNITY
Start: 2021-06-26

## 2023-10-19 RX ORDER — METOPROLOL TARTRATE 50 MG/1
1 TABLET ORAL 2 TIMES DAILY
COMMUNITY
Start: 2018-12-27

## 2023-10-19 RX ORDER — LOSARTAN POTASSIUM 25 MG/1
TABLET ORAL
COMMUNITY
Start: 2023-09-27 | End: 2023-10-20 | Stop reason: SDUPTHER

## 2023-10-19 RX ORDER — ASCORBIC ACID 125 MG
1 TABLET,CHEWABLE ORAL DAILY
COMMUNITY

## 2023-10-19 RX ORDER — CLOPIDOGREL BISULFATE 75 MG/1
75 TABLET ORAL DAILY
COMMUNITY
End: 2024-05-16 | Stop reason: SDUPTHER

## 2023-10-19 RX ORDER — OLIVE LEAF EXTRACT 250 MG
1 CAPSULE ORAL 2 TIMES DAILY
COMMUNITY

## 2023-10-19 RX ORDER — TOPIRAMATE 100 MG/1
1 TABLET, FILM COATED ORAL 2 TIMES DAILY
COMMUNITY
Start: 2019-01-09 | End: 2023-10-20 | Stop reason: ALTCHOICE

## 2023-10-19 RX ORDER — OXYCODONE AND ACETAMINOPHEN 5; 325 MG/1; MG/1
TABLET ORAL
Status: ON HOLD | COMMUNITY
End: 2024-03-13 | Stop reason: SDUPTHER

## 2023-10-19 RX ORDER — TOPIRAMATE 50 MG/1
50 TABLET, FILM COATED ORAL DAILY
COMMUNITY

## 2023-10-19 RX ORDER — LANOLIN ALCOHOL/MO/W.PET/CERES
CREAM (GRAM) TOPICAL
COMMUNITY

## 2023-10-19 RX ORDER — ATORVASTATIN CALCIUM 80 MG/1
TABLET, FILM COATED ORAL
COMMUNITY
End: 2024-02-28 | Stop reason: SDUPTHER

## 2023-10-19 RX ORDER — FUROSEMIDE 40 MG/1
2 TABLET ORAL DAILY
COMMUNITY
Start: 2023-04-18

## 2023-10-19 RX ORDER — CLONAZEPAM 0.5 MG/1
TABLET ORAL
COMMUNITY

## 2023-10-20 ENCOUNTER — OFFICE VISIT (OUTPATIENT)
Dept: CARDIOLOGY | Facility: CLINIC | Age: 63
End: 2023-10-20
Payer: MEDICARE

## 2023-10-20 VITALS
WEIGHT: 168.7 LBS | BODY MASS INDEX: 33.12 KG/M2 | DIASTOLIC BLOOD PRESSURE: 100 MMHG | HEIGHT: 60 IN | SYSTOLIC BLOOD PRESSURE: 168 MMHG | HEART RATE: 82 BPM

## 2023-10-20 DIAGNOSIS — E78.2 MIXED HYPERLIPIDEMIA: ICD-10-CM

## 2023-10-20 DIAGNOSIS — E66.9 CLASS 1 OBESITY WITH BODY MASS INDEX (BMI) OF 32.0 TO 32.9 IN ADULT, UNSPECIFIED OBESITY TYPE, UNSPECIFIED WHETHER SERIOUS COMORBIDITY PRESENT: ICD-10-CM

## 2023-10-20 DIAGNOSIS — I25.10 CAD S/P PERCUTANEOUS CORONARY ANGIOPLASTY: ICD-10-CM

## 2023-10-20 DIAGNOSIS — D64.9 ANEMIA, UNSPECIFIED TYPE: ICD-10-CM

## 2023-10-20 DIAGNOSIS — I87.2 CHRONIC VENOUS INSUFFICIENCY: ICD-10-CM

## 2023-10-20 DIAGNOSIS — I10 ESSENTIAL HYPERTENSION, BENIGN: ICD-10-CM

## 2023-10-20 DIAGNOSIS — Z98.61 CAD S/P PERCUTANEOUS CORONARY ANGIOPLASTY: ICD-10-CM

## 2023-10-20 PROCEDURE — 3080F DIAST BP >= 90 MM HG: CPT | Performed by: INTERNAL MEDICINE

## 2023-10-20 PROCEDURE — 3077F SYST BP >= 140 MM HG: CPT | Performed by: INTERNAL MEDICINE

## 2023-10-20 PROCEDURE — 1036F TOBACCO NON-USER: CPT | Performed by: INTERNAL MEDICINE

## 2023-10-20 PROCEDURE — 4010F ACE/ARB THERAPY RXD/TAKEN: CPT | Performed by: INTERNAL MEDICINE

## 2023-10-20 PROCEDURE — 99214 OFFICE O/P EST MOD 30 MIN: CPT | Performed by: INTERNAL MEDICINE

## 2023-10-20 PROCEDURE — 3008F BODY MASS INDEX DOCD: CPT | Performed by: INTERNAL MEDICINE

## 2023-10-20 RX ORDER — LOSARTAN POTASSIUM 50 MG/1
50 TABLET ORAL NIGHTLY
Qty: 30 TABLET | Refills: 0 | Status: SHIPPED | OUTPATIENT
Start: 2023-10-20 | End: 2023-11-03 | Stop reason: SDUPTHER

## 2023-10-20 RX ORDER — SPIRONOLACTONE 50 MG/1
50 TABLET, FILM COATED ORAL DAILY
Qty: 30 TABLET | Refills: 0 | Status: SHIPPED | OUTPATIENT
Start: 2023-10-20 | End: 2023-11-03 | Stop reason: SDUPTHER

## 2023-10-20 NOTE — PROGRESS NOTES
CARDIOLOGY OFFICE VISIT      CHIEF COMPLAINT    2 week follow up    HISTORY OF PRESENT ILLNESS  The patient states she has not been doing well.  She states she is having severe headaches.  Her blood pressures been running high between 160-200 systolically.  She states in general she just feels very poor.  She has not slept.  She is trying her current medication any problems.  She had a renal artery duplex performed which demonstrated no high-grade lesions.  I told her I am going to increase her spironolactone and losartan.  I went over her recent renal profile with her which is satisfactory.  I will see her back in 2 weeks.   IMPRESSION:   1. Cardiac status is stable  2. Coronary artery disease, no angina.   3. Non ST-segment elevation myocardial infarction, November 6, 2018.  4. Percutaneous coronary intervention with drug-eluting stent,  proximal left anterior descending coronary, proximal right coronary  artery, November 6, 2018.  5. Mixed hyperlipidemia.  6.Anemia being followed by Dr. Khan, she obtains iron infusions once in a while for this.  7.Obesity  8. Chronic venous insufficiency and lymphedema of lower extremities bilaterally           Please excuse any errors in grammar or translation related to this dictation. Voice recognition software was utilized to prepare this document.             Past Medical History  History reviewed. No pertinent past medical history.    Social History  Social History     Tobacco Use    Smoking status: Never    Smokeless tobacco: Never   Substance Use Topics    Alcohol use: Not on file     Comment: socially    Drug use: Not on file       Family History     Family History   Problem Relation Name Age of Onset    Dementia Mother      Other (cardiac disorder) Father      Diabetes Father      Diabetes Brother          Allergies:  Allergies   Allergen Reactions    Iodides Anaphylaxis     Contrast DYE ONLY    Other reaction(s): Hives, Respiratory distress   IVP dye    Iodinated  Contrast Media Hives, Rash and Shortness of breath     IVP dye    Erythromycin Rash    Gabapentin Other     No focus    Moxifloxacin Unknown    Pregabalin Other     confusion    Metformin Hives, Itching and Rash    Sulfa (Sulfonamide Antibiotics) Hives and Rash     Other reaction(s): Hives        Outpatient Medications:  Current Outpatient Medications   Medication Instructions    acarbose (Precose) 25 mg tablet Take 1 tablet by mouth 3 times daily (with meals)    amitriptyline (Elavil) 25 mg tablet 1 tablet, oral, Nightly    atorvastatin (Lipitor) 80 mg tablet TAKE ONE TABLET BY MOUTH AT BEDTIME    blood sugar diagnostic strip 1 each by In Vitro route daily As needed.    cholecalciferol, vitamin D3, 25 mcg (1,000 unit) tablet,chewable 1 tablet, oral, Daily    clonazePAM (KlonoPIN) 0.5 mg tablet TAKE ONE TABLET BY MOUTH EVERY DAY AT BEDTIME AS TOLERATED    clopidogrel (PLAVIX) 75 mg, oral, Daily    furosemide (Lasix) 40 mg tablet 2 tablets, oral, Daily    horse chestnut 300 mg capsule TAKE 1 CAPSULE Every twelve hours    losartan (Cozaar) 25 mg tablet TAKE ONE TABLET BY MOUTH EVERY DAY IN THE EVENING    magnesium oxide (Mag-Ox) 400 mg (241.3 mg magnesium) tablet Take 1 tablet by mouth daily    metoprolol tartrate (Lopressor) 50 mg tablet 1 tablet, oral, 2 times daily    nitroglycerin (Nitrostat) 0.4 mg SL tablet PLACE 1 TABLET UNDER THE TONGUE EVERY 5 MINUTES FOR UP TO 3 DOSES AS NEEDED FOR CHEST PAIN    oxyCODONE-acetaminophen (Percocet) 5-325 mg tablet TAKE ONE TABLET BY MOUTH THREE TIMES A DAY AS NEEDED FOR PAIN    potassium chloride CR 20 mEq ER tablet TAKE ONE TABLET BY MOUTH DAILY AS NEEDED WHEN TAKING FUROSEMIDE    spironolactone (Aldactone) 25 mg tablet TAKE ONE TABLET BY MOUTH EVERY DAY    tiZANidine (Zanaflex) 4 mg tablet     topiramate (Topamax) 50 mg tablet Take 1 tablet (50 mg) by mouth once daily.          REVIEW OF SYSTEMS  Review of Systems   All other systems reviewed and are  negative.        VITALS  Vitals:    10/20/23 1126   BP: (!) 168/100   Pulse: 82       PHYSICAL EXAM  Vitals and nursing note reviewed.   Constitutional:       Appearance: Healthy appearance.   Eyes:      Conjunctiva/sclera: Conjunctivae normal.      Pupils: Pupils are equal, round, and reactive to light.   Pulmonary:      Effort: Pulmonary effort is normal.      Breath sounds: Normal breath sounds.   Cardiovascular:      PMI at left midclavicular line. Normal rate. Regular rhythm.      Murmurs: There is no murmur.      No gallop.  No click. No rub.   Pulses:     Intact distal pulses.   Edema:     Peripheral edema absent.   Musculoskeletal: Normal range of motion. Skin:     General: Skin is warm and dry.   Neurological:      Mental Status: Alert and oriented to person, place and time.           ASSESSMENT AND PLAN  Diagnoses and all orders for this visit:  CAD S/P percutaneous coronary angioplasty  Mixed hyperlipidemia  Anemia, unspecified type  Class 1 obesity with body mass index (BMI) of 32.0 to 32.9 in adult, unspecified obesity type, unspecified whether serious comorbidity present  Chronic venous insufficiency  Essential hypertension, benign      [unfilled]

## 2023-10-20 NOTE — PATIENT INSTRUCTIONS
Increase Spironolactone to 500 mg daily.  Increase Losartan to 50 mg daily  If no improvement in B/P after a few days, may increase Losartan to 50 mg twice a day    Follow up office visit in 2 weeks    Continue same medications/treatment.  Patient educated on proper medication use.  Please bring all medicines, vitamins and herbal supplements with you when you come to the office.    I, Mattie Ko LPN, am scribing for and in the presence of Dr. Jj Bernard MD, FACC

## 2023-11-03 ENCOUNTER — OFFICE VISIT (OUTPATIENT)
Dept: CARDIOLOGY | Facility: CLINIC | Age: 63
End: 2023-11-03
Payer: MEDICARE

## 2023-11-03 VITALS
SYSTOLIC BLOOD PRESSURE: 120 MMHG | HEIGHT: 60 IN | WEIGHT: 172.9 LBS | HEART RATE: 64 BPM | DIASTOLIC BLOOD PRESSURE: 72 MMHG | BODY MASS INDEX: 33.95 KG/M2

## 2023-11-03 DIAGNOSIS — E78.2 MIXED HYPERLIPIDEMIA: ICD-10-CM

## 2023-11-03 DIAGNOSIS — E78.00 HYPERCHOLESTEROLEMIA: ICD-10-CM

## 2023-11-03 DIAGNOSIS — Z87.891 FORMER CIGARETTE SMOKER: ICD-10-CM

## 2023-11-03 DIAGNOSIS — I25.2 H/O NON-ST ELEVATION MYOCARDIAL INFARCTION (NSTEMI): ICD-10-CM

## 2023-11-03 DIAGNOSIS — I25.10 CAD S/P PERCUTANEOUS CORONARY ANGIOPLASTY: ICD-10-CM

## 2023-11-03 DIAGNOSIS — D50.8 OTHER IRON DEFICIENCY ANEMIA: ICD-10-CM

## 2023-11-03 DIAGNOSIS — I10 ESSENTIAL HYPERTENSION, BENIGN: ICD-10-CM

## 2023-11-03 DIAGNOSIS — Z98.61 CAD S/P PERCUTANEOUS CORONARY ANGIOPLASTY: ICD-10-CM

## 2023-11-03 DIAGNOSIS — E11.9 TYPE 2 DIABETES MELLITUS WITHOUT COMPLICATION, WITHOUT LONG-TERM CURRENT USE OF INSULIN (MULTI): ICD-10-CM

## 2023-11-03 DIAGNOSIS — I25.2 HISTORY OF MYOCARDIAL INFARCTION: ICD-10-CM

## 2023-11-03 PROBLEM — I25.119 CORONARY ARTERY DISEASE WITH ANGINA PECTORIS (CMS-HCC): Status: RESOLVED | Noted: 2023-10-19 | Resolved: 2023-11-03

## 2023-11-03 PROCEDURE — 4010F ACE/ARB THERAPY RXD/TAKEN: CPT | Performed by: INTERNAL MEDICINE

## 2023-11-03 PROCEDURE — 1036F TOBACCO NON-USER: CPT | Performed by: INTERNAL MEDICINE

## 2023-11-03 PROCEDURE — 3074F SYST BP LT 130 MM HG: CPT | Performed by: INTERNAL MEDICINE

## 2023-11-03 PROCEDURE — 3078F DIAST BP <80 MM HG: CPT | Performed by: INTERNAL MEDICINE

## 2023-11-03 PROCEDURE — 99214 OFFICE O/P EST MOD 30 MIN: CPT | Performed by: INTERNAL MEDICINE

## 2023-11-03 PROCEDURE — 3008F BODY MASS INDEX DOCD: CPT | Performed by: INTERNAL MEDICINE

## 2023-11-03 RX ORDER — LOSARTAN POTASSIUM 50 MG/1
50 TABLET ORAL 2 TIMES DAILY
Qty: 180 TABLET | Refills: 3 | Status: SHIPPED | OUTPATIENT
Start: 2023-11-03 | End: 2024-11-02

## 2023-11-03 RX ORDER — SPIRONOLACTONE 50 MG/1
50 TABLET, FILM COATED ORAL DAILY
Qty: 90 TABLET | Refills: 3 | Status: SHIPPED | OUTPATIENT
Start: 2023-11-03 | End: 2024-11-02

## 2023-11-03 NOTE — PATIENT INSTRUCTIONS
Patient to follow up in 1 year with Dr. Jj Simpson MD     Please INCREASE Losartan to 50mg TWICE Daily.   Call with updates and concerns.     No other changes today.   Continue same medications and treatments with above changes noted.   Patient educated on proper medication use.   Patient educated on risk factor modification.   Please bring any lab results from other providers / physicians to your next appointment.     Please bring all medicines, vitamins, and herbal supplements with you when you come to the office.     Prescriptions will not be filled unless you are compliant with your follow up appointments or have a follow up appointment scheduled as per instruction of your physician. Refills should be requested at the time of your visit.    IDavid RN am scribing for and in the presence of Dr. Jj Bernard MD

## 2023-11-03 NOTE — PROGRESS NOTES
CARDIOLOGY OFFICE VISIT      CHIEF COMPLAINT      HISTORY OF PRESENT ILLNESS  The patient states she is doing much better at this time.  She states her blood pressure is under much better control.  She has not had any more headaches.  She states she does not get much sleep at night because of severe back pain.  She states when she first takes her blood pressure in the morning after getting up and without taking her medications its still little bit high in the 150-160 systolic and 90 diastolic at times.  I told her she could try to increase her losartan to 50 mg twice a day to see if any improvement in this.  She will let me know if she has any problems.  Otherwise I will see her back in approximately 1 year.    IMPRESSION:   Coronary artery disease, no angina.   Non ST-segment elevation myocardial infarction, 2018.  Percutaneous coronary intervention with drug-eluting stent, proximal left anterior descending coronary, proximal right coronary artery, 2018.  Mixed hyperlipidemia  Hypertension  Anemia being followed by Dr. Khan, she obtains iron infusions once in a while for this.  Obesity    Please excuse any errors in grammar or translation related to this dictation. Voice recognition software was utilized to prepare this document.    Past Medical History  History reviewed. No pertinent past medical history.    Social History  Social History     Tobacco Use    Smoking status: Former     Packs/day: 0.50     Years: 10.00     Additional pack years: 0.00     Total pack years: 5.00     Types: Cigarettes     Quit date: 2019     Years since quittin.0    Smokeless tobacco: Never   Substance Use Topics    Alcohol use: Yes     Comment: socially    Drug use: Never       Family History     Family History   Problem Relation Name Age of Onset    Dementia Mother      Other (cardiac disorder) Father      Diabetes Father      Diabetes Brother          Allergies:  Allergies   Allergen Reactions     Iodides Anaphylaxis     Contrast DYE ONLY    Other reaction(s): Hives, Respiratory distress   IVP dye    Iodinated Contrast Media Hives, Rash and Shortness of breath     IVP dye    Erythromycin Rash    Gabapentin Other     No focus    Moxifloxacin Unknown    Pregabalin Other     confusion    Metformin Hives, Itching and Rash    Sulfa (Sulfonamide Antibiotics) Hives and Rash     Other reaction(s): Hives        Outpatient Medications:  Current Outpatient Medications   Medication Instructions    acarbose (Precose) 25 mg tablet Take 1 tablet by mouth 3 times daily (with meals)    amitriptyline (Elavil) 25 mg tablet 1 tablet, oral, Nightly    atorvastatin (Lipitor) 80 mg tablet TAKE ONE TABLET BY MOUTH AT BEDTIME    blood sugar diagnostic strip 1 each by In Vitro route daily As needed.    cholecalciferol, vitamin D3, 25 mcg (1,000 unit) tablet,chewable 1 tablet, oral, Daily    clonazePAM (KlonoPIN) 0.5 mg tablet TAKE ONE TABLET BY MOUTH EVERY DAY AT BEDTIME AS TOLERATED    clopidogrel (PLAVIX) 75 mg, oral, Daily    furosemide (Lasix) 40 mg tablet 2 tablets, oral, Daily, PRN    horse chestnut 300 mg capsule 1 capsule, oral, 2 times daily, TAKE 1 CAPSULE Every twelve hours PRN    losartan (COZAAR) 50 mg, oral, Nightly    magnesium oxide (Mag-Ox) 400 mg (241.3 mg magnesium) tablet Take 1 tablet by mouth daily    metoprolol tartrate (Lopressor) 50 mg tablet 1 tablet, oral, 2 times daily    nitroglycerin (Nitrostat) 0.4 mg SL tablet PLACE 1 TABLET UNDER THE TONGUE EVERY 5 MINUTES FOR UP TO 3 DOSES AS NEEDED FOR CHEST PAIN    oxyCODONE-acetaminophen (Percocet) 5-325 mg tablet TAKE ONE TABLET BY MOUTH THREE TIMES A DAY AS NEEDED FOR PAIN    potassium chloride CR 20 mEq ER tablet TAKE ONE TABLET BY MOUTH DAILY AS NEEDED WHEN TAKING FUROSEMIDE    spironolactone (ALDACTONE) 50 mg, oral, Daily    tiZANidine (Zanaflex) 4 mg tablet Take 1 tablet (4 mg) by mouth every 8 hours if needed for muscle spasms.    topiramate (Topamax) 50 mg  tablet Take 1 tablet (50 mg) by mouth once daily.          REVIEW OF SYSTEMS  Review of Systems   All other systems reviewed and are negative.        VITALS  Vitals:    11/03/23 1124   BP: 120/72   Pulse: 64       PHYSICAL EXAM  Vitals reviewed.   Constitutional:       Appearance: Normal and healthy appearance. Well-developed and not in distress.   Eyes:      Conjunctiva/sclera: Conjunctivae normal.      Pupils: Pupils are equal, round, and reactive to light.   Neck:      Vascular: No JVR. JVD normal.   Pulmonary:      Effort: Pulmonary effort is normal.      Breath sounds: Normal breath sounds. No wheezing. No rhonchi. No rales.   Chest:      Chest wall: Not tender to palpatation.   Cardiovascular:      PMI at left midclavicular line. Normal rate. Regular rhythm. Normal S1. Normal S2.       Murmurs: There is no murmur.      No gallop.  No click. No rub.   Pulses:     Intact distal pulses.   Edema:     Peripheral edema absent.   Abdominal:      Tenderness: There is no abdominal tenderness.   Musculoskeletal: Normal range of motion.         General: No tenderness.      Cervical back: Normal range of motion. Skin:     General: Skin is warm and dry.   Neurological:      General: No focal deficit present.      Mental Status: Alert and oriented to person, place and time.   Psychiatric:         Behavior: Behavior is cooperative.           ASSESSMENT AND PLAN  Diagnoses and all orders for this visit:  CAD S/P percutaneous coronary angioplasty  H/O non-ST elevation myocardial infarction (NSTEMI)  Mixed hyperlipidemia  Hypercholesterolemia  History of myocardial infarction  Essential hypertension, benign  Type 2 diabetes mellitus without complication, without long-term current use of insulin (CMS/[facility])  BMI 33.0-33.9,adult  Other iron deficiency anemia  Former cigarette smoker      [unfilled]

## 2024-01-10 ENCOUNTER — TELEPHONE (OUTPATIENT)
Dept: PRIMARY CARE CLINIC | Age: 64
End: 2024-01-10

## 2024-02-14 ENCOUNTER — PROCEDURE VISIT (OUTPATIENT)
Dept: UROLOGY | Age: 64
End: 2024-02-14
Payer: MEDICARE

## 2024-02-14 VITALS
HEART RATE: 67 BPM | WEIGHT: 165 LBS | BODY MASS INDEX: 32.39 KG/M2 | HEIGHT: 60 IN | DIASTOLIC BLOOD PRESSURE: 70 MMHG | SYSTOLIC BLOOD PRESSURE: 138 MMHG

## 2024-02-14 DIAGNOSIS — Z85.51 HISTORY OF BLADDER CANCER: Primary | ICD-10-CM

## 2024-02-14 DIAGNOSIS — C67.9 RECURRENT MALIGNANT NEOPLASM OF BLADDER (HCC): ICD-10-CM

## 2024-02-14 LAB
BILIRUBIN, POC: ABNORMAL
BLOOD URINE, POC: ABNORMAL
CLARITY, POC: CLEAR
COLOR, POC: YELLOW
GLUCOSE URINE, POC: ABNORMAL
KETONES, POC: ABNORMAL
LEUKOCYTE EST, POC: ABNORMAL
NITRITE, POC: ABNORMAL
PH, POC: 6
PROTEIN, POC: ABNORMAL
SPECIFIC GRAVITY, POC: >1.03
UROBILINOGEN, POC: 0.2

## 2024-02-14 PROCEDURE — G8417 CALC BMI ABV UP PARAM F/U: HCPCS | Performed by: UROLOGY

## 2024-02-14 PROCEDURE — 3017F COLORECTAL CA SCREEN DOC REV: CPT | Performed by: UROLOGY

## 2024-02-14 PROCEDURE — 52000 CYSTOURETHROSCOPY: CPT | Performed by: UROLOGY

## 2024-02-14 PROCEDURE — 81003 URINALYSIS AUTO W/O SCOPE: CPT | Performed by: UROLOGY

## 2024-02-14 PROCEDURE — G8427 DOCREV CUR MEDS BY ELIG CLIN: HCPCS | Performed by: UROLOGY

## 2024-02-14 PROCEDURE — 3075F SYST BP GE 130 - 139MM HG: CPT | Performed by: UROLOGY

## 2024-02-14 PROCEDURE — G8484 FLU IMMUNIZE NO ADMIN: HCPCS | Performed by: UROLOGY

## 2024-02-14 PROCEDURE — 1036F TOBACCO NON-USER: CPT | Performed by: UROLOGY

## 2024-02-14 PROCEDURE — 3078F DIAST BP <80 MM HG: CPT | Performed by: UROLOGY

## 2024-02-14 PROCEDURE — 99213 OFFICE O/P EST LOW 20 MIN: CPT | Performed by: UROLOGY

## 2024-02-14 RX ORDER — NITROFURANTOIN 25; 75 MG/1; MG/1
100 CAPSULE ORAL ONCE
Qty: 1 CAPSULE | Refills: 0 | Status: SHIPPED | COMMUNITY
Start: 2024-02-14 | End: 2024-02-14

## 2024-02-14 RX ORDER — SPIRONOLACTONE 50 MG/1
50 TABLET, FILM COATED ORAL DAILY
COMMUNITY

## 2024-02-14 RX ORDER — LOSARTAN POTASSIUM 50 MG/1
50 TABLET ORAL 2 TIMES DAILY
COMMUNITY

## 2024-02-14 NOTE — PROGRESS NOTES
Subjective:      Patient ID: Laureen Noriega is a 63 y.o. female    HPI This is a 63 yo female with h/o Anxiety, CBP, Fibromyalgia, HTN, DM, CAD with stents on Plavix, and prior h/o Ta low grade UC of bladder diagnosed in 1/22 and back for surveillance cystoscopy. She is former patient of Dr Larson. She had 6 weeks of BCG after the initial diagnosis in 2022. When last seen on 8/14/23, she had cystoscopy showing no recurrence and is now on 6 mo regimen. She has no interval  complaints and no gross hematuria or dysuria. She wants to proceed with cystoscopy today.      Past Medical History:   Diagnosis Date    Anemia     she sees hematology    Anxiety     Back pain with right-sided radiculopathy     Bladder CA in situ 2022    Chronic left shoulder pain     Depression     Fibromyalgia     Hepatitis     Hyperlipidemia     Hypertension     Osteoarthritis     SHOULDER    Restless legs syndrome     Type II or unspecified type diabetes mellitus without mention of complication, not stated as uncontrolled      Past Surgical History:   Procedure Laterality Date    APPENDECTOMY  09/24/2022    [facility]    BLADDER SURGERY Right 01/12/2022    CYSTOSCOPY RIGHT  RETROGRADE PYELOGRAM RIGHT DOUBLE J STENT performed by Tk Larson MD at [facility] [place]    COLONOSCOPY      CYSTOSCOPY Right 01/12/2022    TRANSURETHRAL RESECTION OF  BLADDER TUMOR G.E.T. WITH MUSCLE RELAXATION performed by Tk Larson MD at [facility] [place]    HYSTERECTOMY (CERVIX STATUS UNKNOWN)      MAMMO IMPLANT DIGITAL DIAG BI  03/02/2011    ROTATOR CUFF REPAIR Left 10/2021    TONSILLECTOMY AND ADENOIDECTOMY       Social History     Socioeconomic History    Marital status:      Spouse name: None    Number of children: None    Years of education: None    Highest education level: None   Tobacco Use    Smoking status: Former     Current packs/day: 0.00     Average packs/day: 0.1 packs/day for 20.0 years (1.0 ttl pk-yrs)     Types: Cigarettes     Start date: 11/3/1998

## 2024-02-28 DIAGNOSIS — E78.2 MIXED HYPERLIPIDEMIA: ICD-10-CM

## 2024-02-28 NOTE — TELEPHONE ENCOUNTER
Received request for prescription refills for patient.   Patient follows with Dr. Jj Bernard      Last OV 11/3/23  Next OV 11/8/24    Pended for signing and sent to provider

## 2024-02-29 RX ORDER — ATORVASTATIN CALCIUM 80 MG/1
80 TABLET, FILM COATED ORAL NIGHTLY
Qty: 90 TABLET | Refills: 2 | Status: SHIPPED | OUTPATIENT
Start: 2024-02-29

## 2024-03-06 ENCOUNTER — OFFICE VISIT (OUTPATIENT)
Dept: UROLOGY | Facility: CLINIC | Age: 64
End: 2024-03-06
Payer: MEDICARE

## 2024-03-06 VITALS
HEART RATE: 80 BPM | BODY MASS INDEX: 32.75 KG/M2 | TEMPERATURE: 97.5 F | SYSTOLIC BLOOD PRESSURE: 114 MMHG | WEIGHT: 167.7 LBS | DIASTOLIC BLOOD PRESSURE: 65 MMHG

## 2024-03-06 DIAGNOSIS — R31.9 HEMATURIA, UNSPECIFIED TYPE: ICD-10-CM

## 2024-03-06 DIAGNOSIS — C67.6 MALIGNANT NEOPLASM OF URETERIC ORIFICE (MULTI): Primary | ICD-10-CM

## 2024-03-06 PROCEDURE — 1036F TOBACCO NON-USER: CPT | Performed by: UROLOGY

## 2024-03-06 PROCEDURE — 3074F SYST BP LT 130 MM HG: CPT | Performed by: UROLOGY

## 2024-03-06 PROCEDURE — 4010F ACE/ARB THERAPY RXD/TAKEN: CPT | Performed by: UROLOGY

## 2024-03-06 PROCEDURE — 99204 OFFICE O/P NEW MOD 45 MIN: CPT | Performed by: UROLOGY

## 2024-03-06 PROCEDURE — 3078F DIAST BP <80 MM HG: CPT | Performed by: UROLOGY

## 2024-03-06 PROCEDURE — 87086 URINE CULTURE/COLONY COUNT: CPT

## 2024-03-06 PROCEDURE — 3008F BODY MASS INDEX DOCD: CPT | Performed by: UROLOGY

## 2024-03-06 RX ORDER — CEFAZOLIN SODIUM 2 G/100ML
2 INJECTION, SOLUTION INTRAVENOUS ONCE
Status: CANCELLED | OUTPATIENT
Start: 2024-03-06 | End: 2024-03-06

## 2024-03-06 RX ORDER — SODIUM CHLORIDE 9 MG/ML
100 INJECTION, SOLUTION INTRAVENOUS CONTINUOUS
Status: CANCELLED | OUTPATIENT
Start: 2024-03-06

## 2024-03-06 NOTE — PROGRESS NOTES
Subjective   Patient ID: Cuca Laguerre is a 63 y.o. female new patient kindly referred by Dr. Wang Almeida who presents for Bladder Cancer. Patient has a history of bladder cancer diagnosed in 1/2022.    HPI  Previously followed by Dr. Sorto. She was diagnosed with TCC bladder after TURBT July 2022 and had BCG treatments >2 years ago. Patient notes she routinely has occult blood. She relates that recently she has had some UTI symptoms.     Patient has HTN that is coming under control. Her daughter was recently diagnosed with a brain tumor and the patient is her main caregiver. The patient would like to get her own medical issues resolved quickly so she can focus on her daughter's health.     She is on Plavix.   Patient works as an oncology nurse.     Past Medical History  No past medical history on file.     Surgical History  Past Surgical History:   Procedure Laterality Date    CT HEAD ANGIO W AND WO IV CONTRAST  1/30/2023    CT HEAD ANGIO W AND WO IV CONTRAST 1/30/2023 DOCTOR [facility]    HYSTERECTOMY  04/01/2014    Hysterectomy    MR HEAD ANGIO WO IV CONTRAST  1/29/2023    MR HEAD ANGIO WO IV CONTRAST 1/29/2023 DOCTOR OFFICE [facility]    OTHER SURGICAL HISTORY  09/15/2022    Appendectomy    OTHER SURGICAL HISTORY  07/25/2022    Transurethral resection of bladder tumor    OTHER SURGICAL HISTORY  06/15/2022    Surgery    OTHER SURGICAL HISTORY  06/15/2022    Colonoscopy    OTHER SURGICAL HISTORY  06/15/2022    Ulnar nerve transposition    OTHER SURGICAL HISTORY  07/25/2022    Shoulder surgery    TONSILLECTOMY  04/01/2014    Tonsillectomy         Social History  She reports that she quit smoking about 4 years ago. Her smoking use included cigarettes. She has a 5.00 pack-year smoking history. She has never used smokeless tobacco. She reports current alcohol use. She reports that she does not use drugs.    Family History  Family History   Problem Relation Name Age of Onset    Dementia Mother      Other (cardiac  disorder) Father      Diabetes Father      Diabetes Brother         Medications    Current Outpatient Medications:     acarbose (Precose) 25 mg tablet, Take 1 tablet by mouth 3 times daily (with meals), Disp: , Rfl:     amitriptyline (Elavil) 25 mg tablet, Take 1 tablet (25 mg) by mouth once daily at bedtime., Disp: , Rfl:     atorvastatin (Lipitor) 80 mg tablet, Take 1 tablet (80 mg) by mouth once daily at bedtime., Disp: 90 tablet, Rfl: 2    blood sugar diagnostic strip, 1 each by In Vitro route daily As needed., Disp: , Rfl:     cholecalciferol, vitamin D3, 25 mcg (1,000 unit) tablet,chewable, Chew 1 tablet (25 mcg) once daily., Disp: , Rfl:     clonazePAM (KlonoPIN) 0.5 mg tablet, TAKE ONE TABLET BY MOUTH EVERY DAY AT BEDTIME AS TOLERATED, Disp: , Rfl:     clopidogrel (Plavix) 75 mg tablet, Take 1 tablet (75 mg) by mouth once daily., Disp: , Rfl:     furosemide (Lasix) 40 mg tablet, Take 2 tablets (80 mg) by mouth once daily. PRN, Disp: , Rfl:     losartan (Cozaar) 50 mg tablet, Take 1 tablet (50 mg) by mouth 2 times a day., Disp: 180 tablet, Rfl: 3    magnesium oxide (Mag-Ox) 400 mg (241.3 mg magnesium) tablet, Take 1 tablet by mouth daily, Disp: , Rfl:     metoprolol tartrate (Lopressor) 50 mg tablet, Take 1 tablet by mouth 2 times a day., Disp: , Rfl:     nitroglycerin (Nitrostat) 0.4 mg SL tablet, PLACE 1 TABLET UNDER THE TONGUE EVERY 5 MINUTES FOR UP TO 3 DOSES AS NEEDED FOR CHEST PAIN, Disp: , Rfl:     oxyCODONE-acetaminophen (Percocet) 5-325 mg tablet, TAKE ONE TABLET BY MOUTH THREE TIMES A DAY AS NEEDED FOR PAIN, Disp: , Rfl:     potassium chloride CR 20 mEq ER tablet, TAKE ONE TABLET BY MOUTH DAILY AS NEEDED WHEN TAKING FUROSEMIDE, Disp: , Rfl:     spironolactone (Aldactone) 50 mg tablet, Take 1 tablet (50 mg) by mouth once daily., Disp: 90 tablet, Rfl: 3    tiZANidine (Zanaflex) 4 mg tablet, Take 1 tablet (4 mg) by mouth every 8 hours if needed for muscle spasms., Disp: , Rfl:     topiramate (Topamax)  50 mg tablet, Take 1 tablet (50 mg) by mouth once daily., Disp: , Rfl:     horse chestnut 300 mg capsule, Take 1 capsule by mouth 2 times a day. TAKE 1 CAPSULE Every twelve hours PRN, Disp: , Rfl:      Allergies  Iodides, Iodinated contrast media, Erythromycin, Gabapentin, Moxifloxacin, Pregabalin, Metformin, and Sulfa (sulfonamide antibiotics)     Review of Systems  A 12 system review was completed and is negative with the exception of those signs and symptoms noted in the history of present illness.    Objective   Physical Exam  General: in NAD, appears stated age  Head: normocephalic, atraumatic  Respiratory: normal effort, no use of accessory muscles  Cardiovascular: no edema noted  Skin: normal turgor, no rashes  Neurologic: grossly intact, oriented to person/place/time  Psychiatric: mode and affect appropriate     Assessment/Plan     She will hold her Plavix for 1 week before surgery. Schedule TURBT and follow-up with cystoscopy stent removal and pathology.     Follow-up with TURBT and aftercare for continued management of TCC bladder.     Scribe Attestation  By signing my name below, IYolis , Scribe   attest that this documentation has been prepared under the direction and in the presence of Lee Pitt MD.

## 2024-03-07 ENCOUNTER — LAB (OUTPATIENT)
Dept: LAB | Facility: LAB | Age: 64
End: 2024-03-07
Payer: MEDICARE

## 2024-03-07 DIAGNOSIS — C67.6 MALIGNANT NEOPLASM OF URETERIC ORIFICE (MULTI): ICD-10-CM

## 2024-03-07 LAB
ANION GAP SERPL CALC-SCNC: 12 MMOL/L (ref 10–20)
BACTERIA UR CULT: NORMAL
BUN SERPL-MCNC: 9 MG/DL (ref 6–23)
CALCIUM SERPL-MCNC: 9.6 MG/DL (ref 8.6–10.3)
CHLORIDE SERPL-SCNC: 107 MMOL/L (ref 98–107)
CO2 SERPL-SCNC: 22 MMOL/L (ref 21–32)
CREAT SERPL-MCNC: 0.8 MG/DL (ref 0.5–1.05)
EGFRCR SERPLBLD CKD-EPI 2021: 83 ML/MIN/1.73M*2
ERYTHROCYTE [DISTWIDTH] IN BLOOD BY AUTOMATED COUNT: 14.7 % (ref 11.5–14.5)
GLUCOSE SERPL-MCNC: 181 MG/DL (ref 74–99)
HCT VFR BLD AUTO: 43.3 % (ref 36–46)
HGB BLD-MCNC: 13.5 G/DL (ref 12–16)
MCH RBC QN AUTO: 29.2 PG (ref 26–34)
MCHC RBC AUTO-ENTMCNC: 31.2 G/DL (ref 32–36)
MCV RBC AUTO: 94 FL (ref 80–100)
NRBC BLD-RTO: 0 /100 WBCS (ref 0–0)
PLATELET # BLD AUTO: 220 X10*3/UL (ref 150–450)
POTASSIUM SERPL-SCNC: 4.5 MMOL/L (ref 3.5–5.3)
RBC # BLD AUTO: 4.62 X10*6/UL (ref 4–5.2)
SODIUM SERPL-SCNC: 136 MMOL/L (ref 136–145)
WBC # BLD AUTO: 8.1 X10*3/UL (ref 4.4–11.3)

## 2024-03-07 PROCEDURE — 36415 COLL VENOUS BLD VENIPUNCTURE: CPT

## 2024-03-07 PROCEDURE — 85027 COMPLETE CBC AUTOMATED: CPT

## 2024-03-07 PROCEDURE — 80048 BASIC METABOLIC PNL TOTAL CA: CPT

## 2024-03-12 PROBLEM — C67.6: Status: ACTIVE | Noted: 2024-03-06

## 2024-03-13 ENCOUNTER — TELEPHONE (OUTPATIENT)
Dept: PRIMARY CARE CLINIC | Age: 64
End: 2024-03-13

## 2024-03-13 ENCOUNTER — ANESTHESIA EVENT (OUTPATIENT)
Dept: OPERATING ROOM | Facility: HOSPITAL | Age: 64
End: 2024-03-13
Payer: MEDICARE

## 2024-03-13 ENCOUNTER — APPOINTMENT (OUTPATIENT)
Dept: RADIOLOGY | Facility: HOSPITAL | Age: 64
End: 2024-03-13
Payer: MEDICARE

## 2024-03-13 ENCOUNTER — HOSPITAL ENCOUNTER (OUTPATIENT)
Facility: HOSPITAL | Age: 64
Setting detail: OUTPATIENT SURGERY
Discharge: HOME | End: 2024-03-13
Attending: UROLOGY | Admitting: UROLOGY
Payer: MEDICARE

## 2024-03-13 ENCOUNTER — ANESTHESIA (OUTPATIENT)
Dept: OPERATING ROOM | Facility: HOSPITAL | Age: 64
End: 2024-03-13
Payer: MEDICARE

## 2024-03-13 VITALS
BODY MASS INDEX: 30.38 KG/M2 | HEIGHT: 60 IN | RESPIRATION RATE: 16 BRPM | HEART RATE: 71 BPM | OXYGEN SATURATION: 97 % | SYSTOLIC BLOOD PRESSURE: 123 MMHG | WEIGHT: 154.76 LBS | TEMPERATURE: 97.3 F | DIASTOLIC BLOOD PRESSURE: 85 MMHG

## 2024-03-13 DIAGNOSIS — C67.6 MALIGNANT NEOPLASM OF URETERIC ORIFICE (MULTI): ICD-10-CM

## 2024-03-13 LAB
GLUCOSE BLD MANUAL STRIP-MCNC: 105 MG/DL (ref 74–99)
GLUCOSE BLD MANUAL STRIP-MCNC: 123 MG/DL (ref 74–99)

## 2024-03-13 PROCEDURE — 3700000002 HC GENERAL ANESTHESIA TIME - EACH INCREMENTAL 1 MINUTE: Performed by: UROLOGY

## 2024-03-13 PROCEDURE — 7100000001 HC RECOVERY ROOM TIME - INITIAL BASE CHARGE: Performed by: UROLOGY

## 2024-03-13 PROCEDURE — 88307 TISSUE EXAM BY PATHOLOGIST: CPT | Mod: TC,ELYLAB | Performed by: UROLOGY

## 2024-03-13 PROCEDURE — 2500000005 HC RX 250 GENERAL PHARMACY W/O HCPCS: Performed by: ANESTHESIOLOGY

## 2024-03-13 PROCEDURE — 3600000003 HC OR TIME - INITIAL BASE CHARGE - PROCEDURE LEVEL THREE: Performed by: UROLOGY

## 2024-03-13 PROCEDURE — 88307 TISSUE EXAM BY PATHOLOGIST: CPT | Performed by: PATHOLOGY

## 2024-03-13 PROCEDURE — 7100000002 HC RECOVERY ROOM TIME - EACH INCREMENTAL 1 MINUTE: Performed by: UROLOGY

## 2024-03-13 PROCEDURE — 3600000008 HC OR TIME - EACH INCREMENTAL 1 MINUTE - PROCEDURE LEVEL THREE: Performed by: UROLOGY

## 2024-03-13 PROCEDURE — 2500000004 HC RX 250 GENERAL PHARMACY W/ HCPCS (ALT 636 FOR OP/ED): Performed by: STUDENT IN AN ORGANIZED HEALTH CARE EDUCATION/TRAINING PROGRAM

## 2024-03-13 PROCEDURE — 7100000009 HC PHASE TWO TIME - INITIAL BASE CHARGE: Performed by: UROLOGY

## 2024-03-13 PROCEDURE — 2720000007 HC OR 272 NO HCPCS: Performed by: UROLOGY

## 2024-03-13 PROCEDURE — 2500000004 HC RX 250 GENERAL PHARMACY W/ HCPCS (ALT 636 FOR OP/ED): Performed by: ANESTHESIOLOGY

## 2024-03-13 PROCEDURE — 7100000010 HC PHASE TWO TIME - EACH INCREMENTAL 1 MINUTE: Performed by: UROLOGY

## 2024-03-13 PROCEDURE — 2500000004 HC RX 250 GENERAL PHARMACY W/ HCPCS (ALT 636 FOR OP/ED): Performed by: UROLOGY

## 2024-03-13 PROCEDURE — 3700000001 HC GENERAL ANESTHESIA TIME - INITIAL BASE CHARGE: Performed by: UROLOGY

## 2024-03-13 PROCEDURE — 82947 ASSAY GLUCOSE BLOOD QUANT: CPT

## 2024-03-13 PROCEDURE — 52234 CYSTOSCOPY AND TREATMENT: CPT | Performed by: UROLOGY

## 2024-03-13 RX ORDER — ONDANSETRON HYDROCHLORIDE 2 MG/ML
4 INJECTION, SOLUTION INTRAVENOUS ONCE AS NEEDED
Status: DISCONTINUED | OUTPATIENT
Start: 2024-03-13 | End: 2024-03-13 | Stop reason: HOSPADM

## 2024-03-13 RX ORDER — OXYCODONE HYDROCHLORIDE 5 MG/1
5 TABLET ORAL EVERY 4 HOURS PRN
Status: CANCELLED | OUTPATIENT
Start: 2024-03-13

## 2024-03-13 RX ORDER — SODIUM CHLORIDE, SODIUM LACTATE, POTASSIUM CHLORIDE, CALCIUM CHLORIDE 600; 310; 30; 20 MG/100ML; MG/100ML; MG/100ML; MG/100ML
100 INJECTION, SOLUTION INTRAVENOUS CONTINUOUS
Status: DISCONTINUED | OUTPATIENT
Start: 2024-03-13 | End: 2024-03-13 | Stop reason: HOSPADM

## 2024-03-13 RX ORDER — ONDANSETRON HYDROCHLORIDE 2 MG/ML
INJECTION, SOLUTION INTRAVENOUS AS NEEDED
Status: DISCONTINUED | OUTPATIENT
Start: 2024-03-13 | End: 2024-03-13

## 2024-03-13 RX ORDER — FENTANYL CITRATE 50 UG/ML
25 INJECTION, SOLUTION INTRAMUSCULAR; INTRAVENOUS EVERY 5 MIN PRN
Status: DISCONTINUED | OUTPATIENT
Start: 2024-03-13 | End: 2024-03-13 | Stop reason: HOSPADM

## 2024-03-13 RX ORDER — PROPOFOL 10 MG/ML
INJECTION, EMULSION INTRAVENOUS AS NEEDED
Status: DISCONTINUED | OUTPATIENT
Start: 2024-03-13 | End: 2024-03-13

## 2024-03-13 RX ORDER — LABETALOL HYDROCHLORIDE 5 MG/ML
5 INJECTION, SOLUTION INTRAVENOUS ONCE AS NEEDED
Status: DISCONTINUED | OUTPATIENT
Start: 2024-03-13 | End: 2024-03-13 | Stop reason: HOSPADM

## 2024-03-13 RX ORDER — ROCURONIUM BROMIDE 10 MG/ML
INJECTION, SOLUTION INTRAVENOUS AS NEEDED
Status: DISCONTINUED | OUTPATIENT
Start: 2024-03-13 | End: 2024-03-13

## 2024-03-13 RX ORDER — SODIUM CHLORIDE 9 MG/ML
100 INJECTION, SOLUTION INTRAVENOUS CONTINUOUS
Status: DISCONTINUED | OUTPATIENT
Start: 2024-03-13 | End: 2024-03-13 | Stop reason: HOSPADM

## 2024-03-13 RX ORDER — GLYCOPYRROLATE 0.2 MG/ML
INJECTION INTRAMUSCULAR; INTRAVENOUS AS NEEDED
Status: DISCONTINUED | OUTPATIENT
Start: 2024-03-13 | End: 2024-03-13

## 2024-03-13 RX ORDER — OXYCODONE AND ACETAMINOPHEN 5; 325 MG/1; MG/1
1 TABLET ORAL EVERY 4 HOURS PRN
Qty: 12 TABLET | Refills: 0 | Status: SHIPPED | OUTPATIENT
Start: 2024-03-13

## 2024-03-13 RX ORDER — LIDOCAINE HYDROCHLORIDE 20 MG/ML
INJECTION, SOLUTION INFILTRATION; PERINEURAL AS NEEDED
Status: DISCONTINUED | OUTPATIENT
Start: 2024-03-13 | End: 2024-03-13

## 2024-03-13 RX ORDER — OXYCODONE AND ACETAMINOPHEN 5; 325 MG/1; MG/1
1 TABLET ORAL EVERY 4 HOURS PRN
Qty: 12 TABLET | Refills: 0 | Status: SHIPPED | OUTPATIENT
Start: 2024-03-13 | End: 2024-03-13 | Stop reason: SDUPTHER

## 2024-03-13 RX ORDER — FENTANYL CITRATE 50 UG/ML
INJECTION, SOLUTION INTRAMUSCULAR; INTRAVENOUS AS NEEDED
Status: DISCONTINUED | OUTPATIENT
Start: 2024-03-13 | End: 2024-03-13

## 2024-03-13 RX ORDER — MIDAZOLAM HYDROCHLORIDE 1 MG/ML
INJECTION, SOLUTION INTRAMUSCULAR; INTRAVENOUS AS NEEDED
Status: DISCONTINUED | OUTPATIENT
Start: 2024-03-13 | End: 2024-03-13

## 2024-03-13 RX ORDER — OXYBUTYNIN CHLORIDE 5 MG/1
5 TABLET ORAL EVERY 8 HOURS PRN
Qty: 20 TABLET | Refills: 0 | Status: SHIPPED | OUTPATIENT
Start: 2024-03-13 | End: 2024-09-09

## 2024-03-13 RX ORDER — CEPHALEXIN 500 MG/1
500 CAPSULE ORAL EVERY 8 HOURS
Qty: 9 CAPSULE | Refills: 0 | Status: SHIPPED | OUTPATIENT
Start: 2024-03-13 | End: 2024-03-16

## 2024-03-13 RX ORDER — CEFAZOLIN SODIUM 2 G/100ML
2 INJECTION, SOLUTION INTRAVENOUS ONCE
Status: COMPLETED | OUTPATIENT
Start: 2024-03-13 | End: 2024-03-13

## 2024-03-13 RX ADMIN — FENTANYL CITRATE 25 MCG: 50 INJECTION, SOLUTION INTRAMUSCULAR; INTRAVENOUS at 15:34

## 2024-03-13 RX ADMIN — SODIUM CHLORIDE, POTASSIUM CHLORIDE, SODIUM LACTATE AND CALCIUM CHLORIDE 100 ML/HR: 600; 310; 30; 20 INJECTION, SOLUTION INTRAVENOUS at 15:11

## 2024-03-13 RX ADMIN — FENTANYL CITRATE 100 MCG: 50 INJECTION, SOLUTION INTRAMUSCULAR; INTRAVENOUS at 14:12

## 2024-03-13 RX ADMIN — HYDROMORPHONE HYDROCHLORIDE 0.5 MG: 1 INJECTION, SOLUTION INTRAMUSCULAR; INTRAVENOUS; SUBCUTANEOUS at 15:24

## 2024-03-13 RX ADMIN — MIDAZOLAM 2 MG: 1 INJECTION INTRAMUSCULAR; INTRAVENOUS at 14:00

## 2024-03-13 RX ADMIN — HYDROMORPHONE HYDROCHLORIDE 0.5 MG: 1 INJECTION, SOLUTION INTRAMUSCULAR; INTRAVENOUS; SUBCUTANEOUS at 15:05

## 2024-03-13 RX ADMIN — PROPOFOL 200 MG: 10 INJECTION, EMULSION INTRAVENOUS at 14:13

## 2024-03-13 RX ADMIN — FENTANYL CITRATE 25 MCG: 50 INJECTION, SOLUTION INTRAMUSCULAR; INTRAVENOUS at 15:44

## 2024-03-13 RX ADMIN — SODIUM CHLORIDE, SODIUM LACTATE, POTASSIUM CHLORIDE, AND CALCIUM CHLORIDE: .6; .31; .03; .02 INJECTION, SOLUTION INTRAVENOUS at 13:57

## 2024-03-13 RX ADMIN — CEFAZOLIN SODIUM 2 G: 2 INJECTION, SOLUTION INTRAVENOUS at 14:19

## 2024-03-13 RX ADMIN — LIDOCAINE HYDROCHLORIDE 40 MG: 20 INJECTION, SOLUTION INFILTRATION; PERINEURAL at 14:13

## 2024-03-13 RX ADMIN — SODIUM CHLORIDE 100 ML/HR: 9 INJECTION, SOLUTION INTRAVENOUS at 11:00

## 2024-03-13 RX ADMIN — GLYCOPYRROLATE 0.2 MG: 0.2 INJECTION, SOLUTION INTRAMUSCULAR; INTRAVENOUS at 14:00

## 2024-03-13 RX ADMIN — HYDROMORPHONE HYDROCHLORIDE 0.5 MG: 1 INJECTION, SOLUTION INTRAMUSCULAR; INTRAVENOUS; SUBCUTANEOUS at 15:29

## 2024-03-13 RX ADMIN — FENTANYL CITRATE 25 MCG: 50 INJECTION, SOLUTION INTRAMUSCULAR; INTRAVENOUS at 15:39

## 2024-03-13 RX ADMIN — ROCURONIUM BROMIDE 50 MG: 10 SOLUTION INTRAVENOUS at 14:13

## 2024-03-13 RX ADMIN — FENTANYL CITRATE 25 MCG: 50 INJECTION, SOLUTION INTRAMUSCULAR; INTRAVENOUS at 15:49

## 2024-03-13 RX ADMIN — ONDANSETRON 4 MG: 2 INJECTION, SOLUTION INTRAMUSCULAR; INTRAVENOUS at 14:00

## 2024-03-13 SDOH — HEALTH STABILITY: MENTAL HEALTH: CURRENT SMOKER: 0

## 2024-03-13 ASSESSMENT — PAIN SCALES - GENERAL
PAINLEVEL_OUTOF10: 5 - MODERATE PAIN
PAINLEVEL_OUTOF10: 5 - MODERATE PAIN
PAINLEVEL_OUTOF10: 6
PAINLEVEL_OUTOF10: 6
PAINLEVEL_OUTOF10: 4
PAINLEVEL_OUTOF10: 7
PAIN_LEVEL: 0
PAINLEVEL_OUTOF10: 6
PAINLEVEL_OUTOF10: 6
PAINLEVEL_OUTOF10: 8
PAINLEVEL_OUTOF10: 8
PAINLEVEL_OUTOF10: 6

## 2024-03-13 ASSESSMENT — PAIN - FUNCTIONAL ASSESSMENT
PAIN_FUNCTIONAL_ASSESSMENT: 0-10

## 2024-03-13 ASSESSMENT — PAIN DESCRIPTION - LOCATION
LOCATION: ABDOMEN

## 2024-03-13 ASSESSMENT — PAIN DESCRIPTION - DESCRIPTORS: DESCRIPTORS: BURNING;NAGGING

## 2024-03-13 ASSESSMENT — COLUMBIA-SUICIDE SEVERITY RATING SCALE - C-SSRS
1. IN THE PAST MONTH, HAVE YOU WISHED YOU WERE DEAD OR WISHED YOU COULD GO TO SLEEP AND NOT WAKE UP?: NO
2. HAVE YOU ACTUALLY HAD ANY THOUGHTS OF KILLING YOURSELF?: NO
6. HAVE YOU EVER DONE ANYTHING, STARTED TO DO ANYTHING, OR PREPARED TO DO ANYTHING TO END YOUR LIFE?: NO

## 2024-03-13 NOTE — ANESTHESIA PREPROCEDURE EVALUATION
Cuca Laguerre is a 63 y.o. female here for:    TURBT  With Lee Pitt MD  Pre-Op Diagnosis Codes:     * Cancer of bladder [C67.6]    Relevant Problems   Cardiovascular   (+) CAD S/P percutaneous coronary angioplasty (Percutaneous coronary intervention with drug-eluting stent, proximal left anterior descending coronary, proximal right coronary artery, 2018)   (+) Essential hypertension, benign   (+) Mixed hyperlipidemia      Endocrine   (+) Class 1 obesity with body mass index (BMI) of 32.0 to 32.9 in adult   (+) Type 2 diabetes mellitus without complication, without long-term current use of insulin (CMS/HCC)      Neuro/Psych   (+) Anxiety   (+) Depressive disorder      Genitourinary   (+) History of bladder cancer   (+) Malignant neoplasm of ureteric orifice (CMS/HCC)      Tobacco   (+) Former cigarette smoker      Cardiac and Vasculature   (+) H/O non-ST elevation myocardial infarction (NSTEMI)      Hematology and Neoplasia   (+) Urothelial carcinoma (CMS/HCC)       Lab Results   Component Value Date    HGB 13.5 2024    HCT 43.3 2024    WBC 8.1 2024     2024     2024    K 4.5 2024     2024    CREATININE 0.80 2024    BUN 9 2024     TTE 2023:  CONCLUSIONS:   1. Left ventricular systolic function is normal with a 60-65% estimated ejection fraction.   2. Spectral Doppler shows a pseudonormal pattern of left ventricular diastolic filling.   3. Mild tricuspid regurgitation is visualized.      Social History     Tobacco Use   Smoking Status Former    Packs/day: 0.50    Years: 10.00    Additional pack years: 0.00    Total pack years: 5.00    Types: Cigarettes    Quit date: 2019    Years since quittin.3   Smokeless Tobacco Never       Allergies   Allergen Reactions    Iodides Anaphylaxis     Contrast DYE ONLY    Other reaction(s): Hives, Respiratory distress   IVP dye    Iodinated Contrast Media Hives, Rash and Shortness of  breath     IVP dye    Erythromycin Rash    Gabapentin Other     No focus    Moxifloxacin Unknown    Pregabalin Other     confusion    Metformin Hives, Itching and Rash    Sulfa (Sulfonamide Antibiotics) Hives and Rash     Other reaction(s): Hives       Current Outpatient Medications   Medication Instructions    acarbose (Precose) 25 mg tablet Take 1 tablet by mouth 3 times daily (with meals)    amitriptyline (Elavil) 25 mg tablet 1 tablet, oral, Nightly    atorvastatin (LIPITOR) 80 mg, oral, Nightly    blood sugar diagnostic strip 1 each by In Vitro route daily As needed.    cholecalciferol, vitamin D3, 25 mcg (1,000 unit) tablet,chewable 1 tablet, oral, Daily    clonazePAM (KlonoPIN) 0.5 mg tablet TAKE ONE TABLET BY MOUTH EVERY DAY AT BEDTIME AS TOLERATED    clopidogrel (PLAVIX) 75 mg, oral, Daily    furosemide (Lasix) 40 mg tablet 2 tablets, oral, Daily, PRN    horse chestnut 300 mg capsule 1 capsule, oral, 2 times daily, TAKE 1 CAPSULE Every twelve hours PRN    losartan (COZAAR) 50 mg, oral, 2 times daily    magnesium oxide (Mag-Ox) 400 mg (241.3 mg magnesium) tablet Take 1 tablet by mouth daily    metoprolol tartrate (Lopressor) 50 mg tablet 1 tablet, oral, 2 times daily    nitroglycerin (Nitrostat) 0.4 mg SL tablet PLACE 1 TABLET UNDER THE TONGUE EVERY 5 MINUTES FOR UP TO 3 DOSES AS NEEDED FOR CHEST PAIN    oxyCODONE-acetaminophen (Percocet) 5-325 mg tablet TAKE ONE TABLET BY MOUTH THREE TIMES A DAY AS NEEDED FOR PAIN    potassium chloride CR 20 mEq ER tablet TAKE ONE TABLET BY MOUTH DAILY AS NEEDED WHEN TAKING FUROSEMIDE    spironolactone (ALDACTONE) 50 mg, oral, Daily    tiZANidine (Zanaflex) 4 mg tablet Take 1 tablet (4 mg) by mouth every 8 hours if needed for muscle spasms.    topiramate (Topamax) 50 mg tablet Take 1 tablet (50 mg) by mouth once daily.       Past Surgical History:   Procedure Laterality Date    CT HEAD ANGIO W AND WO IV CONTRAST  1/30/2023    CT HEAD ANGIO W AND WO IV CONTRAST 1/30/2023  DOCTOR OFFICE [facility]GACY    HYSTERECTOMY  04/01/2014    Hysterectomy    MR HEAD ANGIO WO IV CONTRAST  1/29/2023    MR HEAD ANGIO WO IV CONTRAST 1/29/2023 DOCTOR OFFICE LEGACY    OTHER SURGICAL HISTORY  09/15/2022    Appendectomy    OTHER SURGICAL HISTORY  07/25/2022    Transurethral resection of bladder tumor    OTHER SURGICAL HISTORY  06/15/2022    Surgery    OTHER SURGICAL HISTORY  06/15/2022    Colonoscopy    OTHER SURGICAL HISTORY  06/15/2022    Ulnar nerve transposition    OTHER SURGICAL HISTORY  07/25/2022    Shoulder surgery    TONSILLECTOMY  04/01/2014    Tonsillectomy       Family History   Problem Relation Name Age of Onset    Dementia Mother      Other (cardiac disorder) Father      Diabetes Father      Diabetes Brother         NPO Details:  NPO/Void Status  Carbohydrate Drink Given Prior to Surgery? : N  Date of Last Liquid: 03/12/24  Time of Last Liquid: 2000  Date of Last Solid: 03/12/24  Time of Last Solid: 2000  Last Intake Type: Light meal; Food  Time of Last Void: 0900        Physical Exam    Anesthesia Plan    History of general anesthesia?: yes  History of complications of general anesthesia?: no    ASA 3     general     The patient is not a current smoker.    intravenous induction   Postoperative administration of opioids is intended.  Trial extubation is planned.  Anesthetic plan and risks discussed with patient.    Plan discussed with CRNA.

## 2024-03-13 NOTE — DISCHARGE INSTRUCTIONS
DEPARTMENT OF UROLOGY  DISCHARGE INSTRUCTIONS TURBT/BIOPSY  Outpatient Surgery    C O N F I D E N T I A L   I N F O R M A T I O N    Cuca Laguerre      Call [phone] during regular daytime business hours (8:00 am - 5:00 pm) and after 5:00 pm and ask for the Urology resident with any questions or concerns.      If it is a life-threatening situation, proceed to the nearest emergency department.        Follow-up appointment:      Thank you for the opportunity to care for you today.  Your health and healing are very important to us.  We hope we made you feel as comfortable as possible and are committed to your recovery and continued well-being.      The following is a brief overview of your transurethral bladder tumor resection/biopsy today. Some of the information contained on this summary may be confidential.  This information should be kept in your records and should be shared with your regular doctor.    Physicians:   Dr. Pitt    Procedure performed: bladder tumor resection/biopsy  Pending results:   pathology of tissue taken from your bladder (we will go over these results at your post-operative appointment.)    What to Expect During your Recovery and Home Care  Anesthesia Side Effects   You received general anesthesia today.  You may feel sleepy, tired, or have a sore throat.   You may also feel drowsiness, dizziness, or inability to think clearly.  For your safety, do not drive, drink alcoholic beverages, take any unprescribed medication or make any important decisions for 24 hours.  A responsible adult should be with you for 24 hours.        Activity and Recovery    No heavy lifting over ten pounds x 2 weeks. Limit activity if you have a urinary catheter in place. Avoid activities that would cause pulling or tugging on your catheter.    Do not drive or operate heavy machinery while taking narcotic pain medications as these medications can alter perception, impair judgement, and slow reaction  times.      Pain Control  Unfortunately, you may experience pain after your procedure.  Adequate management can include alternative measures to help ease your pain and can include over the counter Tylenol or Ibuprofen can be taken as prescribed as needed for breakthrough pain. Do not take more than 4,000mg of Tylenol in a 24-hour period.      Percocet is a narcotic and can become addictive and may also induce constipation.  Please take Colace(prescribed or not) stool softeners when taking this medication to prevent constipation.    You may also experience bladder spasms due to the catheter. Ditropan may be prescribed to help alleviate this problem.    Nausea/Vomiting   Clear liquids are best tolerated at first. Start slow, advance your diet as tolerated to normal foods. Avoid spicy, greasy, heavy foods at first. Also, you may feel nauseous or like you need to vomit if you take any type of medication on an empty stomach.  Call your physician if you are unable to eat or drink and have persistent vomiting.    Signs of Bleeding   You are going to have some blood in your urine. Your urine will be light pink to yellow. If you have a catheter you always want to look at the urine in the tubing of your catheter and not in the large urine collection bag to check for bleeding. If urine becomes thick dark venancio red, has large clots or stops draining, please notify your physician.    Treatment/wound care:   Keep area(s) clean and dry. Clean around insertion site of catheter daily with mild soap and water.  It is okay to shower 24 hours after time of surgery.    Do not submerge your catheter in standing water until seen for follow up appointment (no tub bathing, swimming, or hot tubs).      Signs of Infection  Signs of infection can include fever, drainage(green/yellow), chills, burning sensation with passing of urine, catheter leakage, or severe abdominal pain.  If you see any of these occur, please contact your doctor's office  at [phone]. Any fever higher than 100.4, especially if associated with an ill feeling, abdominal pain, chills, or nausea should be reported to your surgeon.          Assist in bowel movements/urination  Increase fiber in diet  Increase water (6 to 8 glasses)  Increase walking   Urination should occur within 6 hours of anesthesia  If you have tried these methods and your bladder still feels full and you cannot use the bathroom, please go to your nearest Emergency room.    Additional Instructions: CATHETER CARE  Always keep the catheters tubing and drainage bag below the level of your bladder.  Avoid loops and kinks in the catheter tubing.  NOTIFY your physician if catheter falls out or catheter seems clogged and urine is not draining.   Do not wear the small leg bag to bed you should be provided with a larger overnight bag that you should wear to bed and can hang over the side of the bed.  We recommend wearing the large bag in the shower as well as this is easy to dry, and you do not get your leg straps wet from your leg bag.   Your catheter should be secured to your upper thigh, do not allow it to hang or dangle.  Your catheter will be removed at your post-operative appointment.

## 2024-03-13 NOTE — OP NOTE
Date: 3/13/2024  OR Location: [facility]    Name: Cuca Laguerre, : 1960, Age: 63 y.o., MRN: [id number], Sex: female    Diagnosis  Pre-op Diagnosis     * Malignant neoplasm of ureteric orifice (CMS/HCC) [C67.6] Post-op Diagnosis     * Malignant neoplasm of ureteric orifice (CMS/HCC) [C67.6]     Procedures  TURBT (small)    Surgeons      * Lee Pitt - Primary    Resident/Fellow/Other Assistant:  Surgeon(s) and Role:    Procedure Summary  Anesthesia: General  ASA: III  Anesthesia Staff:   Anesthesiologist: Travis Ramsey MD  CRNA: JOSS Curran-CRNA; LARRY Cruz  Estimated Blood Loss: Minimal  Intra-op Medications:   Medication Name Total Dose   sodium chloride 0.9 % irrigation solution 3,000 mL   ceFAZolin in dextrose (iso-os) (Ancef) IVPB 2 g 2 g              Anesthesia Record               Intraprocedure I/O Totals       None           Specimen:   Order Name Source Comment Collection Info Order Time   SURGICAL PATHOLOGY EXAM BLADDER TRANSURETHRAL RESECTION Pre-op diagnosis:  Malignant neoplasm of ureteric orifice (CMS/HCC) [C67.6] Collected By: Lee Pitt MD 3/13/2024  2:37 PM       Staff:   Circulator: Latosha Santos RN  Scrub Person: Marielle Montanez         Drains and/or Catheters: * None in log *    Tourniquet Times:         Implants:     Findings: Small papillary recurrence just medial to right ureteral orifice, additional small papillary recurrence right posterior wall    Indications: Cuca Laguerre is an 63 y.o. female who is having surgery for Malignant neoplasm of ureteric orifice (CMS/HCC) [C67.6].  Patient has a history of urothelial carcinoma.  On recent office cystoscopy she was found to have a recurrence adjacent to the right ureteral orifice.  She presents today for resection.  The risk, benefits, and alternatives were discussed with patient.  Informed consent was obtained.    Procedure Details: Patient was brought to the operating room and placed in the supine  position.  General anesthesia was induced.  Once anesthetized, her legs were placed in the dorsolithotomy position.  Patient's genitalia were prepped and draped in usual sterile fashion.  A 26 Malaysian resectoscope sheath was passed with a visual obturator.  Once we entered the bladder, the obturator was removed and the working element of the resectoscope was inserted.  We sequentially inspected the bladder.  Just medial to the right ureteral orifice there was a small 5 mm papillary recurrence.  There was a additional 3 mm papillary recurrence along the right posterior wall.  We first resected the recurrence on the posterior wall.  We then resected the recurrence at the right ureteral orifice.  We resected the right ureteral orifice.  The remainder of the ureteral orifice appeared uninvolved with tumor.  There was prompt E flux of urine after resection.  Decision was made not to leave a ureteral stent.  The specimen was collected and sent to pathology.  The bladder was drained and the resectoscope was removed.  The patient was awakened and taken to the PACU in stable condition.    Complications:  None; patient tolerated the procedure well.    Disposition: PACU - hemodynamically stable.  Condition: stable       Lee Pitt  Phone Number: [phone]

## 2024-03-13 NOTE — ANESTHESIA POSTPROCEDURE EVALUATION
Patient: Cuca Laguerre    Procedure Summary       Date: 03/13/24 Room / Location: [facility] OR 08 / [facility]    Anesthesia Start: 1410 Anesthesia Stop: 1449    Procedure: TURBT Diagnosis:       Malignant neoplasm of ureteric orifice (CMS/HCC)      (Malignant neoplasm of ureteric orifice (CMS/HCC) [C67.6])    Surgeons: Lee Pitt MD Responsible Provider: Travis Ramsey MD    Anesthesia Type: general ASA Status: 3            Anesthesia Type: general    Vitals Value Taken Time   /79 03/13/24 1449   Temp 36 03/13/24 1449   Pulse 77 03/13/24 1449   Resp 22 03/13/24 1449   SpO2 100 03/13/24 1449       Anesthesia Post Evaluation    Patient location during evaluation: PACU  Patient participation: complete - patient participated  Level of consciousness: awake and alert  Pain score: 0  Pain management: adequate  Airway patency: patent  Cardiovascular status: acceptable  Respiratory status: acceptable  Hydration status: acceptable  Postoperative Nausea and Vomiting: none        No notable events documented.

## 2024-03-14 ENCOUNTER — TELEPHONE (OUTPATIENT)
Dept: UROLOGY | Facility: CLINIC | Age: 64
End: 2024-03-14
Payer: MEDICARE

## 2024-03-14 DIAGNOSIS — C68.9 UROTHELIAL CARCINOMA (MULTI): Primary | ICD-10-CM

## 2024-03-14 RX ORDER — OXYCODONE HYDROCHLORIDE 5 MG/1
5 TABLET ORAL EVERY 4 HOURS PRN
Qty: 12 TABLET | Refills: 0 | Status: SHIPPED | OUTPATIENT
Start: 2024-03-14 | End: 2024-03-21

## 2024-03-14 RX ORDER — OXYCODONE AND ACETAMINOPHEN 5; 325 MG/1; MG/1
1 TABLET ORAL EVERY 6 HOURS PRN
Qty: 12 TABLET | Refills: 0 | Status: SHIPPED | OUTPATIENT
Start: 2024-03-14 | End: 2024-03-21

## 2024-03-14 RX ORDER — OXYCODONE AND ACETAMINOPHEN 5; 325 MG/1; MG/1
1 TABLET ORAL EVERY 4 HOURS PRN
Qty: 10 TABLET | Refills: 0 | Status: SHIPPED | OUTPATIENT
Start: 2024-03-14 | End: 2024-03-14 | Stop reason: SDUPTHER

## 2024-03-14 NOTE — TELEPHONE ENCOUNTER
Patient calling again stating she is in a lot of pain, states she can come here and  script if you don't have the time to send.

## 2024-03-14 NOTE — TELEPHONE ENCOUNTER
Patient calling she is saying that [facility]  escript is down that is why she will come and get now???? Please advise

## 2024-03-14 NOTE — TELEPHONE ENCOUNTER
[facility] called and said patient keeps calling them for the prescription.    Looks like script went to her mail away not [facility].  Please advise

## 2024-03-14 NOTE — TELEPHONE ENCOUNTER
Patient called and said that Yary tsang did not receive and she is in a lot of pain.  As I was verifying pharmacy I see it said [facility].  Please advise  This is for the Perocet

## 2024-03-21 LAB
LABORATORY COMMENT REPORT: NORMAL
PATH REPORT.FINAL DX SPEC: NORMAL
PATH REPORT.GROSS SPEC: NORMAL
PATH REPORT.RELEVANT HX SPEC: NORMAL
PATH REPORT.TOTAL CANCER: NORMAL
RESIDENT REVIEW: NORMAL

## 2024-03-26 ENCOUNTER — OFFICE VISIT (OUTPATIENT)
Dept: UROLOGY | Facility: CLINIC | Age: 64
End: 2024-03-26
Payer: MEDICARE

## 2024-03-26 VITALS
TEMPERATURE: 97.7 F | SYSTOLIC BLOOD PRESSURE: 94 MMHG | BODY MASS INDEX: 30.08 KG/M2 | WEIGHT: 154 LBS | HEART RATE: 72 BPM | DIASTOLIC BLOOD PRESSURE: 60 MMHG

## 2024-03-26 DIAGNOSIS — C68.9 UROTHELIAL CARCINOMA (MULTI): ICD-10-CM

## 2024-03-26 PROCEDURE — 3074F SYST BP LT 130 MM HG: CPT | Performed by: UROLOGY

## 2024-03-26 PROCEDURE — 99213 OFFICE O/P EST LOW 20 MIN: CPT | Performed by: UROLOGY

## 2024-03-26 PROCEDURE — 3008F BODY MASS INDEX DOCD: CPT | Performed by: UROLOGY

## 2024-03-26 PROCEDURE — 3078F DIAST BP <80 MM HG: CPT | Performed by: UROLOGY

## 2024-03-26 PROCEDURE — 4010F ACE/ARB THERAPY RXD/TAKEN: CPT | Performed by: UROLOGY

## 2024-03-26 PROCEDURE — 1036F TOBACCO NON-USER: CPT | Performed by: UROLOGY

## 2024-03-26 NOTE — PROGRESS NOTES
Subjective   Patient ID: Cuca Laguerre is a 63 y.o. female who presents for Path results. Patient is s/p TURBT on 3/13/24.    HPI  Patient states she is doing well since the procedure. She relates that for a few days after the procedure she had a sensation of UTI and achy back and feeling like she might have a fever but all symptoms resolved.     Patient's daughter has had to start psychotherapy in an effort to deal with her diagnosis and prognosis of terminal cancer at age 27.     Results  Surgical Pathology  FINAL DIAGNOSIS   A.  BLADDER LESIONS, TRANSURETHRAL RESECTION:  --FRAGMENT OF STROMAL-TYPE TISSUE, DEVOID OF UROTHELIUM, AND SHOWING NO DIAGNOSTIC FINDINGS.  --FRAGMENT OF BLADDER MUCOSA WITH NO EVIDENCE OF NEOPLASIA.  --SINGLE MINUTE DETACHED UNORIENTED FRAGMENT OF PAPILLARY UROTHELIAL NEOPLASM LACKING SIGNIFICANT CYTOLOGIC ATYPIA; FAVOR PAPILLARY UROTHELIAL NEOPLASM OF LOW MALIGNANT POTENTIAL. SEE NOTE.  Note: Multiple deeper levels were examined.  Specimen does not include detrusor muscle.      Review of Systems  A 12 system review was completed and is negative with the exception of those signs and symptoms noted in the history of present illness.    Objective   Physical Exam  General: in NAD, appears stated age  Head: normocephalic, atraumatic  Respiratory: normal effort, no use of accessory muscles  Cardiovascular: no edema noted  Skin: normal turgor, no rashes  Neurologic: grossly intact, oriented to person/place/time  Psychiatric: mode and affect appropriate     Assessment/Plan   Problem List Items Addressed This Visit             ICD-10-CM    Urothelial carcinoma (CMS/[facility]) C68.9    Relevant Orders    Follow Up In Urology     Pathology results were benign. Schedule for cystoscopy in 6 months.     Follow-up in 6 months for continued management of bladder lesions.     Scribe Attestation  By signing my name below, IYolis Scribe   attest that this documentation has been prepared under the  direction and in the presence of Lee Pitt MD.

## 2024-05-02 ENCOUNTER — TELEPHONE (OUTPATIENT)
Dept: FAMILY MEDICINE CLINIC | Age: 64
End: 2024-05-02

## 2024-05-16 DIAGNOSIS — I25.10 CORONARY ARTERY DISEASE INVOLVING NATIVE CORONARY ARTERY OF NATIVE HEART, UNSPECIFIED WHETHER ANGINA PRESENT: Primary | ICD-10-CM

## 2024-05-16 NOTE — TELEPHONE ENCOUNTER
Received request for prescription refill for patient.  Patient follows with Dr. Jj Bernard MD     Request is for Plavix  Is patient currently on medication- yes    Last OV- 11/3/23  Next OV- 7/17/24    Pended for signing and sent to provider.

## 2024-05-17 RX ORDER — CLOPIDOGREL BISULFATE 75 MG/1
75 TABLET ORAL DAILY
Qty: 90 TABLET | Refills: 3 | Status: SHIPPED | OUTPATIENT
Start: 2024-05-17 | End: 2025-05-17

## 2024-05-29 ENCOUNTER — TELEPHONE (OUTPATIENT)
Dept: PRIMARY CARE CLINIC | Age: 64
End: 2024-05-29

## 2024-07-17 ENCOUNTER — APPOINTMENT (OUTPATIENT)
Dept: CARDIOLOGY | Facility: CLINIC | Age: 64
End: 2024-07-17
Payer: MEDICARE

## 2024-08-01 DIAGNOSIS — I10 ESSENTIAL HYPERTENSION, BENIGN: ICD-10-CM

## 2024-08-01 RX ORDER — METOPROLOL TARTRATE 50 MG/1
50 TABLET ORAL 2 TIMES DAILY
Qty: 180 TABLET | Refills: 3 | Status: SHIPPED | OUTPATIENT
Start: 2024-08-01 | End: 2025-08-01

## 2024-08-01 NOTE — TELEPHONE ENCOUNTER
Received request for prescription refill for patient.  Patient follows with Dr. Jj Bernard MD     Request is for metoprolol  Is patient currently on medication- yes    Last OV- 11/3/23  Next OV- 11/8/24    Pended for signing and sent to provider.

## 2024-08-08 ENCOUNTER — TELEPHONE (OUTPATIENT)
Dept: PRIMARY CARE CLINIC | Age: 64
End: 2024-08-08

## 2024-08-08 NOTE — TELEPHONE ENCOUNTER
Vm left for patient if interested in making a follow up appt she can reach out to the [place] location at [phone]

## 2024-08-23 ENCOUNTER — TELEPHONE (OUTPATIENT)
Dept: PRIMARY CARE CLINIC | Age: 64
End: 2024-08-23

## 2024-09-18 RX ORDER — LIDOCAINE HYDROCHLORIDE 20 MG/ML
1 JELLY TOPICAL ONCE
Status: DISCONTINUED | OUTPATIENT
Start: 2024-09-26 | End: 2024-09-18

## 2024-09-18 RX ORDER — LIDOCAINE HYDROCHLORIDE 20 MG/ML
1 JELLY TOPICAL ONCE
Status: COMPLETED | OUTPATIENT
Start: 2024-09-26 | End: 2024-09-26

## 2024-09-19 NOTE — ANESTHESIA PROCEDURE NOTES
Airway  Date/Time: 3/13/2024 2:17 PM  Urgency: elective    Airway not difficult    Staffing  Performed: CRNA   Authorized by: Travis Ramsey MD    Performed by: JOSS Curran-UZAIR  Patient location during procedure: OR    Indications and Patient Condition  Indications for airway management: anesthesia and airway protection  Spontaneous Ventilation: absent  Sedation level: deep  Preoxygenated: yes  Mask difficulty assessment: 1 - vent by mask    Final Airway Details  Final airway type: endotracheal airway      Successful airway: ETT  Cuffed: yes   Successful intubation technique: video laryngoscopy  Facilitating devices/methods: intubating stylet  Endotracheal tube insertion site: oral  Blade size: #3  ETT size (mm): 7.5  Cormack-Lehane Classification: grade I - full view of glottis  Placement verified by: chest auscultation and capnometry   Measured from: gums  ETT to gums (cm): 22  Number of attempts at approach: 1           Dharmesh Guillen MD

## 2024-09-26 ENCOUNTER — APPOINTMENT (OUTPATIENT)
Dept: UROLOGY | Facility: CLINIC | Age: 64
End: 2024-09-26
Payer: MEDICARE

## 2024-09-26 VITALS
BODY MASS INDEX: 30.08 KG/M2 | SYSTOLIC BLOOD PRESSURE: 115 MMHG | HEART RATE: 51 BPM | TEMPERATURE: 98.2 F | WEIGHT: 154 LBS | DIASTOLIC BLOOD PRESSURE: 62 MMHG

## 2024-09-26 DIAGNOSIS — C68.9 UROTHELIAL CARCINOMA (MULTI): ICD-10-CM

## 2024-09-26 DIAGNOSIS — R35.0 URINARY FREQUENCY: ICD-10-CM

## 2024-09-26 LAB
POC APPEARANCE, URINE: CLEAR
POC BILIRUBIN, URINE: NEGATIVE
POC BLOOD, URINE: NEGATIVE
POC COLOR, URINE: YELLOW
POC GLUCOSE, URINE: NEGATIVE MG/DL
POC KETONES, URINE: NEGATIVE MG/DL
POC LEUKOCYTES, URINE: ABNORMAL
POC NITRITE,URINE: NEGATIVE
POC PH, URINE: 6.5 PH
POC PROTEIN, URINE: ABNORMAL MG/DL
POC SPECIFIC GRAVITY, URINE: >=1.03
POC UROBILINOGEN, URINE: 0.2 EU/DL

## 2024-09-26 PROCEDURE — 52000 CYSTOURETHROSCOPY: CPT | Performed by: UROLOGY

## 2024-09-26 PROCEDURE — 99214 OFFICE O/P EST MOD 30 MIN: CPT | Performed by: UROLOGY

## 2024-09-26 PROCEDURE — 81003 URINALYSIS AUTO W/O SCOPE: CPT | Performed by: UROLOGY

## 2024-09-26 RX ORDER — OXYBUTYNIN CHLORIDE 10 MG/1
10 TABLET, EXTENDED RELEASE ORAL DAILY
Qty: 30 TABLET | Refills: 11 | Status: SHIPPED | OUTPATIENT
Start: 2024-09-26 | End: 2025-09-21

## 2024-09-26 NOTE — PROGRESS NOTES
Subjective   Patient ID: Cuca Laguerre is a 63 y.o. female who presents for cystoscopy. Last seen 3/26/24 when Pathology results were benign. Schedule for cystoscopy in 6 months.      HPI  The patient states that since the surgery (TURBT 3/13/24) she has had an increase in frequency. The patient thinks she has oxybutynin but has not routinely taken this medication.       Review of Systems  A 12 system review was completed and is negative with the exception of those signs and symptoms noted in the history of present illness.    Objective   Physical Exam  General: in NAD, appears stated age  Head: normocephalic, atraumatic  Respiratory: normal effort, no use of accessory muscles  Cardiovascular: no edema noted  Skin: normal turgor, no rashes  Neurologic: grossly intact, oriented to person/place/time  Psychiatric: mode and affect appropriate     Procedure  Cystoscopy for TCC bladder  ?Patient's genitalia were prepped and draped in usual sterile fashion. A 17 Guinean flexible cystoscope was passed atraumatically per the urethra and the bladder was inspected. No tumors, clots, stones, or foreign bodies were identified. The right and left ureteral orifice were in their normal anatomic position and effluxing clear urine. The scope was retroflexed and the bladder neck was unremarkable. The patient tolerated the procedure well.     Assessment/Plan   Problem List Items Addressed This Visit             ICD-10-CM    Urothelial carcinoma (Multi) C68.9    Relevant Medications    lidocaine 2 % mucosal jelly (Uro-Jet) 1 Application (Completed) (Start on 9/26/2024 10:00 AM)    Other Relevant Orders    POCT UA Automated manually resulted (Completed)    Cystourethroscopy (Completed)    Follow Up In Urology    Urinary frequency R35.0    Relevant Medications    oxybutynin XL (Ditropan-XL) 10 mg 24 hr tablet     The patient tolerated the cystoscopy procedure well. Cystoscopy is SAMEER. I explained that anticholinergic medication must be taken  for several weeks to notice a benefit and must continue taking it to maintain benefit. Order oxybutynin XL 10 mg. We will see the patient back in 6 months for surveillance cystoscopy.     Follow-up in 6 months for continued management of TCC bladder.    Scribe Attestation  By signing my name below, IYolis Scribe   attest that this documentation has been prepared under the direction and in the presence of Lee Pitt MD.

## 2024-09-28 ENCOUNTER — HOSPITAL ENCOUNTER (OUTPATIENT)
Facility: HOSPITAL | Age: 64
Setting detail: OBSERVATION
Discharge: HOME | End: 2024-09-29
Attending: STUDENT IN AN ORGANIZED HEALTH CARE EDUCATION/TRAINING PROGRAM | Admitting: STUDENT IN AN ORGANIZED HEALTH CARE EDUCATION/TRAINING PROGRAM
Payer: MEDICARE

## 2024-09-28 ENCOUNTER — APPOINTMENT (OUTPATIENT)
Dept: RADIOLOGY | Facility: HOSPITAL | Age: 64
End: 2024-09-28
Payer: MEDICARE

## 2024-09-28 DIAGNOSIS — K92.2 GASTROINTESTINAL HEMORRHAGE, UNSPECIFIED GASTROINTESTINAL HEMORRHAGE TYPE: Primary | ICD-10-CM

## 2024-09-28 LAB
ALBUMIN SERPL BCP-MCNC: 4.1 G/DL (ref 3.4–5)
ALP SERPL-CCNC: 52 U/L (ref 33–136)
ALT SERPL W P-5'-P-CCNC: 6 U/L (ref 7–45)
ANION GAP SERPL CALC-SCNC: 11 MMOL/L (ref 10–20)
AST SERPL W P-5'-P-CCNC: 8 U/L (ref 9–39)
BASOPHILS # BLD AUTO: 0.03 X10*3/UL (ref 0–0.1)
BASOPHILS NFR BLD AUTO: 0.3 %
BILIRUB DIRECT SERPL-MCNC: 0.1 MG/DL (ref 0–0.3)
BILIRUB SERPL-MCNC: 0.6 MG/DL (ref 0–1.2)
BUN SERPL-MCNC: 15 MG/DL (ref 6–23)
CALCIUM SERPL-MCNC: 9.3 MG/DL (ref 8.6–10.3)
CHLORIDE SERPL-SCNC: 108 MMOL/L (ref 98–107)
CO2 SERPL-SCNC: 23 MMOL/L (ref 21–32)
CREAT SERPL-MCNC: 0.74 MG/DL (ref 0.5–1.05)
EGFRCR SERPLBLD CKD-EPI 2021: >90 ML/MIN/1.73M*2
EOSINOPHIL # BLD AUTO: 0.01 X10*3/UL (ref 0–0.7)
EOSINOPHIL NFR BLD AUTO: 0.1 %
ERYTHROCYTE [DISTWIDTH] IN BLOOD BY AUTOMATED COUNT: 14.5 % (ref 11.5–14.5)
FERRITIN SERPL-MCNC: 18 NG/ML (ref 8–150)
GLUCOSE BLD MANUAL STRIP-MCNC: 92 MG/DL (ref 74–99)
GLUCOSE SERPL-MCNC: 115 MG/DL (ref 74–99)
HCT VFR BLD AUTO: 36.5 % (ref 36–46)
HGB BLD-MCNC: 11.7 G/DL (ref 12–16)
IMM GRANULOCYTES # BLD AUTO: 0.03 X10*3/UL (ref 0–0.7)
IMM GRANULOCYTES NFR BLD AUTO: 0.3 % (ref 0–0.9)
INR PPP: 1.1 (ref 0.9–1.1)
IRON SATN MFR SERPL: 19 % (ref 25–45)
IRON SERPL-MCNC: 73 UG/DL (ref 35–150)
LACTATE SERPL-SCNC: 0.6 MMOL/L (ref 0.4–2)
LIPASE SERPL-CCNC: 40 U/L (ref 9–82)
LYMPHOCYTES # BLD AUTO: 1.03 X10*3/UL (ref 1.2–4.8)
LYMPHOCYTES NFR BLD AUTO: 11 %
MCH RBC QN AUTO: 28.5 PG (ref 26–34)
MCHC RBC AUTO-ENTMCNC: 32.1 G/DL (ref 32–36)
MCV RBC AUTO: 89 FL (ref 80–100)
MONOCYTES # BLD AUTO: 0.57 X10*3/UL (ref 0.1–1)
MONOCYTES NFR BLD AUTO: 6.1 %
NEUTROPHILS # BLD AUTO: 7.71 X10*3/UL (ref 1.2–7.7)
NEUTROPHILS NFR BLD AUTO: 82.2 %
NRBC BLD-RTO: 0 /100 WBCS (ref 0–0)
PLATELET # BLD AUTO: 201 X10*3/UL (ref 150–450)
POTASSIUM SERPL-SCNC: 4.3 MMOL/L (ref 3.5–5.3)
PROT SERPL-MCNC: 6.7 G/DL (ref 6.4–8.2)
PROTHROMBIN TIME: 12.9 SECONDS (ref 9.8–12.8)
RBC # BLD AUTO: 4.11 X10*6/UL (ref 4–5.2)
SODIUM SERPL-SCNC: 138 MMOL/L (ref 136–145)
TIBC SERPL-MCNC: 390 UG/DL (ref 240–445)
UIBC SERPL-MCNC: 317 UG/DL (ref 110–370)
WBC # BLD AUTO: 9.4 X10*3/UL (ref 4.4–11.3)

## 2024-09-28 PROCEDURE — 99285 EMERGENCY DEPT VISIT HI MDM: CPT | Mod: 25

## 2024-09-28 PROCEDURE — 83605 ASSAY OF LACTIC ACID: CPT | Performed by: NURSE PRACTITIONER

## 2024-09-28 PROCEDURE — 96361 HYDRATE IV INFUSION ADD-ON: CPT

## 2024-09-28 PROCEDURE — 74176 CT ABD & PELVIS W/O CONTRAST: CPT | Mod: FOREIGN READ | Performed by: RADIOLOGY

## 2024-09-28 PROCEDURE — 99223 1ST HOSP IP/OBS HIGH 75: CPT | Performed by: STUDENT IN AN ORGANIZED HEALTH CARE EDUCATION/TRAINING PROGRAM

## 2024-09-28 PROCEDURE — 74176 CT ABD & PELVIS W/O CONTRAST: CPT

## 2024-09-28 PROCEDURE — 96374 THER/PROPH/DIAG INJ IV PUSH: CPT

## 2024-09-28 PROCEDURE — 2500000004 HC RX 250 GENERAL PHARMACY W/ HCPCS (ALT 636 FOR OP/ED): Performed by: NURSE PRACTITIONER

## 2024-09-28 PROCEDURE — 82947 ASSAY GLUCOSE BLOOD QUANT: CPT | Mod: 59

## 2024-09-28 PROCEDURE — 83690 ASSAY OF LIPASE: CPT | Performed by: NURSE PRACTITIONER

## 2024-09-28 PROCEDURE — 2500000001 HC RX 250 WO HCPCS SELF ADMINISTERED DRUGS (ALT 637 FOR MEDICARE OP): Performed by: NURSE PRACTITIONER

## 2024-09-28 PROCEDURE — G0378 HOSPITAL OBSERVATION PER HR: HCPCS

## 2024-09-28 PROCEDURE — 85025 COMPLETE CBC W/AUTO DIFF WBC: CPT | Performed by: NURSE PRACTITIONER

## 2024-09-28 PROCEDURE — 80053 COMPREHEN METABOLIC PANEL: CPT | Performed by: NURSE PRACTITIONER

## 2024-09-28 PROCEDURE — 85610 PROTHROMBIN TIME: CPT | Performed by: NURSE PRACTITIONER

## 2024-09-28 PROCEDURE — 96376 TX/PRO/DX INJ SAME DRUG ADON: CPT

## 2024-09-28 PROCEDURE — 83540 ASSAY OF IRON: CPT | Performed by: STUDENT IN AN ORGANIZED HEALTH CARE EDUCATION/TRAINING PROGRAM

## 2024-09-28 PROCEDURE — 82728 ASSAY OF FERRITIN: CPT | Performed by: STUDENT IN AN ORGANIZED HEALTH CARE EDUCATION/TRAINING PROGRAM

## 2024-09-28 PROCEDURE — 36415 COLL VENOUS BLD VENIPUNCTURE: CPT | Performed by: NURSE PRACTITIONER

## 2024-09-28 PROCEDURE — 82248 BILIRUBIN DIRECT: CPT | Performed by: NURSE PRACTITIONER

## 2024-09-28 PROCEDURE — 96375 TX/PRO/DX INJ NEW DRUG ADDON: CPT

## 2024-09-28 PROCEDURE — 2500000004 HC RX 250 GENERAL PHARMACY W/ HCPCS (ALT 636 FOR OP/ED): Performed by: STUDENT IN AN ORGANIZED HEALTH CARE EDUCATION/TRAINING PROGRAM

## 2024-09-28 RX ORDER — ACETAMINOPHEN 650 MG/1
650 SUPPOSITORY RECTAL EVERY 4 HOURS PRN
Status: DISCONTINUED | OUTPATIENT
Start: 2024-09-28 | End: 2024-09-29 | Stop reason: HOSPADM

## 2024-09-28 RX ORDER — MORPHINE SULFATE 4 MG/ML
4 INJECTION, SOLUTION INTRAMUSCULAR; INTRAVENOUS ONCE
Status: COMPLETED | OUTPATIENT
Start: 2024-09-28 | End: 2024-09-28

## 2024-09-28 RX ORDER — METOPROLOL TARTRATE 50 MG/1
50 TABLET ORAL 2 TIMES DAILY
Status: DISCONTINUED | OUTPATIENT
Start: 2024-09-28 | End: 2024-09-29

## 2024-09-28 RX ORDER — OXYCODONE AND ACETAMINOPHEN 5; 325 MG/1; MG/1
1 TABLET ORAL ONCE
Status: COMPLETED | OUTPATIENT
Start: 2024-09-28 | End: 2024-09-28

## 2024-09-28 RX ORDER — ONDANSETRON HYDROCHLORIDE 2 MG/ML
4 INJECTION, SOLUTION INTRAVENOUS ONCE
Status: COMPLETED | OUTPATIENT
Start: 2024-09-28 | End: 2024-09-28

## 2024-09-28 RX ORDER — PANTOPRAZOLE SODIUM 40 MG/10ML
80 INJECTION, POWDER, LYOPHILIZED, FOR SOLUTION INTRAVENOUS ONCE
Status: COMPLETED | OUTPATIENT
Start: 2024-09-28 | End: 2024-09-28

## 2024-09-28 RX ORDER — POLYETHYLENE GLYCOL 3350 17 G/17G
17 POWDER, FOR SOLUTION ORAL DAILY PRN
Status: DISCONTINUED | OUTPATIENT
Start: 2024-09-29 | End: 2024-09-29 | Stop reason: HOSPADM

## 2024-09-28 RX ORDER — PANTOPRAZOLE SODIUM 40 MG/10ML
40 INJECTION, POWDER, LYOPHILIZED, FOR SOLUTION INTRAVENOUS 2 TIMES DAILY
Status: DISCONTINUED | OUTPATIENT
Start: 2024-09-28 | End: 2024-09-29 | Stop reason: HOSPADM

## 2024-09-28 RX ORDER — OXYCODONE HYDROCHLORIDE 5 MG/1
5 TABLET ORAL EVERY 8 HOURS PRN
Status: DISCONTINUED | OUTPATIENT
Start: 2024-09-28 | End: 2024-09-29 | Stop reason: HOSPADM

## 2024-09-28 RX ORDER — ONDANSETRON HYDROCHLORIDE 2 MG/ML
4 INJECTION, SOLUTION INTRAVENOUS EVERY 6 HOURS PRN
Status: DISCONTINUED | OUTPATIENT
Start: 2024-09-28 | End: 2024-09-29 | Stop reason: HOSPADM

## 2024-09-28 RX ORDER — ACETAMINOPHEN 325 MG/1
650 TABLET ORAL EVERY 4 HOURS PRN
Status: DISCONTINUED | OUTPATIENT
Start: 2024-09-28 | End: 2024-09-29 | Stop reason: HOSPADM

## 2024-09-28 RX ORDER — ACETAMINOPHEN 160 MG/5ML
650 SOLUTION ORAL EVERY 4 HOURS PRN
Status: DISCONTINUED | OUTPATIENT
Start: 2024-09-28 | End: 2024-09-29 | Stop reason: HOSPADM

## 2024-09-28 RX ADMIN — OXYCODONE HYDROCHLORIDE 5 MG: 5 TABLET ORAL at 22:48

## 2024-09-28 RX ADMIN — PANTOPRAZOLE SODIUM 80 MG: 40 INJECTION, POWDER, FOR SOLUTION INTRAVENOUS at 12:56

## 2024-09-28 RX ADMIN — SODIUM CHLORIDE 500 ML: 9 INJECTION, SOLUTION INTRAVENOUS at 12:56

## 2024-09-28 RX ADMIN — ONDANSETRON 4 MG: 2 INJECTION INTRAMUSCULAR; INTRAVENOUS at 12:56

## 2024-09-28 RX ADMIN — PROMETHAZINE HYDROCHLORIDE 12.5 MG: 25 INJECTION INTRAMUSCULAR; INTRAVENOUS at 13:48

## 2024-09-28 RX ADMIN — OXYCODONE HYDROCHLORIDE AND ACETAMINOPHEN 1 TABLET: 5; 325 TABLET ORAL at 17:36

## 2024-09-28 RX ADMIN — ONDANSETRON 4 MG: 2 INJECTION INTRAMUSCULAR; INTRAVENOUS at 20:32

## 2024-09-28 RX ADMIN — MORPHINE SULFATE 4 MG: 4 INJECTION, SOLUTION INTRAMUSCULAR; INTRAVENOUS at 12:56

## 2024-09-28 RX ADMIN — MORPHINE SULFATE 4 MG: 4 INJECTION, SOLUTION INTRAMUSCULAR; INTRAVENOUS at 14:49

## 2024-09-28 RX ADMIN — PANTOPRAZOLE SODIUM 40 MG: 40 INJECTION, POWDER, FOR SOLUTION INTRAVENOUS at 20:32

## 2024-09-28 SDOH — SOCIAL STABILITY: SOCIAL INSECURITY: DO YOU FEEL UNSAFE GOING BACK TO THE PLACE WHERE YOU ARE LIVING?: NO

## 2024-09-28 SDOH — SOCIAL STABILITY: SOCIAL INSECURITY: DOES ANYONE TRY TO KEEP YOU FROM HAVING/CONTACTING OTHER FRIENDS OR DOING THINGS OUTSIDE YOUR HOME?: NO

## 2024-09-28 SDOH — SOCIAL STABILITY: SOCIAL INSECURITY: HAVE YOU HAD THOUGHTS OF HARMING ANYONE ELSE?: NO

## 2024-09-28 SDOH — HEALTH STABILITY: MENTAL HEALTH: HOW OFTEN DO YOU HAVE A DRINK CONTAINING ALCOHOL?: MONTHLY OR LESS

## 2024-09-28 SDOH — SOCIAL STABILITY: SOCIAL INSECURITY: ARE YOU OR HAVE YOU BEEN THREATENED OR ABUSED PHYSICALLY, EMOTIONALLY, OR SEXUALLY BY ANYONE?: NO

## 2024-09-28 SDOH — SOCIAL STABILITY: SOCIAL INSECURITY: WERE YOU ABLE TO COMPLETE ALL THE BEHAVIORAL HEALTH SCREENINGS?: YES

## 2024-09-28 SDOH — SOCIAL STABILITY: SOCIAL INSECURITY
WITHIN THE LAST YEAR, HAVE TO BEEN RAPED OR FORCED TO HAVE ANY KIND OF SEXUAL ACTIVITY BY YOUR PARTNER OR EX-PARTNER?: NO

## 2024-09-28 SDOH — SOCIAL STABILITY: SOCIAL INSECURITY: HAS ANYONE EVER THREATENED TO HURT YOUR FAMILY OR YOUR PETS?: NO

## 2024-09-28 SDOH — SOCIAL STABILITY: SOCIAL INSECURITY: ABUSE: ADULT

## 2024-09-28 SDOH — SOCIAL STABILITY: SOCIAL INSECURITY: HAVE YOU HAD ANY THOUGHTS OF HARMING ANYONE ELSE?: NO

## 2024-09-28 SDOH — ECONOMIC STABILITY: HOUSING INSECURITY: AT ANY TIME IN THE PAST 12 MONTHS, WERE YOU HOMELESS OR LIVING IN A SHELTER (INCLUDING NOW)?: NO

## 2024-09-28 SDOH — ECONOMIC STABILITY: FOOD INSECURITY: WITHIN THE PAST 12 MONTHS, YOU WORRIED THAT YOUR FOOD WOULD RUN OUT BEFORE YOU GOT MONEY TO BUY MORE.: NEVER TRUE

## 2024-09-28 SDOH — SOCIAL STABILITY: SOCIAL INSECURITY
WITHIN THE LAST YEAR, HAVE YOU BEEN KICKED, HIT, SLAPPED, OR OTHERWISE PHYSICALLY HURT BY YOUR PARTNER OR EX-PARTNER?: NO

## 2024-09-28 SDOH — SOCIAL STABILITY: SOCIAL INSECURITY: WITHIN THE LAST YEAR, HAVE YOU BEEN HUMILIATED OR EMOTIONALLY ABUSED IN OTHER WAYS BY YOUR PARTNER OR EX-PARTNER?: NO

## 2024-09-28 SDOH — SOCIAL STABILITY: SOCIAL INSECURITY: WITHIN THE LAST YEAR, HAVE YOU BEEN AFRAID OF YOUR PARTNER OR EX-PARTNER?: NO

## 2024-09-28 SDOH — SOCIAL STABILITY: SOCIAL INSECURITY: ARE THERE ANY APPARENT SIGNS OF INJURIES/BEHAVIORS THAT COULD BE RELATED TO ABUSE/NEGLECT?: NO

## 2024-09-28 SDOH — ECONOMIC STABILITY: INCOME INSECURITY: IN THE PAST 12 MONTHS, HAS THE ELECTRIC, GAS, OIL, OR WATER COMPANY THREATENED TO SHUT OFF SERVICE IN YOUR HOME?: NO

## 2024-09-28 SDOH — ECONOMIC STABILITY: FOOD INSECURITY: WITHIN THE PAST 12 MONTHS, THE FOOD YOU BOUGHT JUST DIDN'T LAST AND YOU DIDN'T HAVE MONEY TO GET MORE.: NEVER TRUE

## 2024-09-28 SDOH — ECONOMIC STABILITY: INCOME INSECURITY: IN THE LAST 12 MONTHS, WAS THERE A TIME WHEN YOU WERE NOT ABLE TO PAY THE MORTGAGE OR RENT ON TIME?: NO

## 2024-09-28 SDOH — SOCIAL STABILITY: SOCIAL INSECURITY: DO YOU FEEL ANYONE HAS EXPLOITED OR TAKEN ADVANTAGE OF YOU FINANCIALLY OR OF YOUR PERSONAL PROPERTY?: NO

## 2024-09-28 SDOH — HEALTH STABILITY: MENTAL HEALTH: HOW OFTEN DO YOU HAVE 6 OR MORE DRINKS ON ONE OCCASION?: NEVER

## 2024-09-28 SDOH — HEALTH STABILITY: MENTAL HEALTH: HOW MANY STANDARD DRINKS CONTAINING ALCOHOL DO YOU HAVE ON A TYPICAL DAY?: PATIENT DOES NOT DRINK

## 2024-09-28 SDOH — ECONOMIC STABILITY: TRANSPORTATION INSECURITY
IN THE PAST 12 MONTHS, HAS THE LACK OF TRANSPORTATION KEPT YOU FROM MEDICAL APPOINTMENTS OR FROM GETTING MEDICATIONS?: NO

## 2024-09-28 SDOH — ECONOMIC STABILITY: HOUSING INSECURITY: IN THE PAST 12 MONTHS, HOW MANY TIMES HAVE YOU MOVED WHERE YOU WERE LIVING?: 1

## 2024-09-28 SDOH — ECONOMIC STABILITY: TRANSPORTATION INSECURITY
IN THE PAST 12 MONTHS, HAS LACK OF TRANSPORTATION KEPT YOU FROM MEETINGS, WORK, OR FROM GETTING THINGS NEEDED FOR DAILY LIVING?: NO

## 2024-09-28 SDOH — ECONOMIC STABILITY: INCOME INSECURITY: HOW HARD IS IT FOR YOU TO PAY FOR THE VERY BASICS LIKE FOOD, HOUSING, MEDICAL CARE, AND HEATING?: NOT HARD AT ALL

## 2024-09-28 ASSESSMENT — COGNITIVE AND FUNCTIONAL STATUS - GENERAL
DAILY ACTIVITIY SCORE: 24
MOBILITY SCORE: 24
MOBILITY SCORE: 24
PATIENT BASELINE BEDBOUND: NO
DAILY ACTIVITIY SCORE: 24

## 2024-09-28 ASSESSMENT — PATIENT HEALTH QUESTIONNAIRE - PHQ9
1. LITTLE INTEREST OR PLEASURE IN DOING THINGS: NOT AT ALL
SUM OF ALL RESPONSES TO PHQ9 QUESTIONS 1 & 2: 0
2. FEELING DOWN, DEPRESSED OR HOPELESS: NOT AT ALL

## 2024-09-28 ASSESSMENT — LIFESTYLE VARIABLES
SUBSTANCE_ABUSE_PAST_12_MONTHS: NO
HOW MANY STANDARD DRINKS CONTAINING ALCOHOL DO YOU HAVE ON A TYPICAL DAY: PATIENT DOES NOT DRINK
HAVE YOU EVER FELT YOU SHOULD CUT DOWN ON YOUR DRINKING: NO
PRESCIPTION_ABUSE_PAST_12_MONTHS: NO
EVER HAD A DRINK FIRST THING IN THE MORNING TO STEADY YOUR NERVES TO GET RID OF A HANGOVER: NO
AUDIT-C TOTAL SCORE: 1
TOTAL SCORE: 0
HOW OFTEN DO YOU HAVE A DRINK CONTAINING ALCOHOL: MONTHLY OR LESS
SKIP TO QUESTIONS 9-10: 1
SKIP TO QUESTIONS 9-10: 1
EVER FELT BAD OR GUILTY ABOUT YOUR DRINKING: NO
HAVE PEOPLE ANNOYED YOU BY CRITICIZING YOUR DRINKING: NO
HOW OFTEN DO YOU HAVE 6 OR MORE DRINKS ON ONE OCCASION: NEVER
AUDIT-C TOTAL SCORE: 1
AUDIT-C TOTAL SCORE: 1

## 2024-09-28 ASSESSMENT — PAIN DESCRIPTION - PAIN TYPE
TYPE: ACUTE PAIN
TYPE: ACUTE PAIN

## 2024-09-28 ASSESSMENT — PAIN - FUNCTIONAL ASSESSMENT
PAIN_FUNCTIONAL_ASSESSMENT: 0-10
PAIN_FUNCTIONAL_ASSESSMENT: 0-10

## 2024-09-28 ASSESSMENT — ACTIVITIES OF DAILY LIVING (ADL)
WALKS IN HOME: INDEPENDENT
GROOMING: INDEPENDENT
ADEQUATE_TO_COMPLETE_ADL: YES
HEARING - LEFT EAR: FUNCTIONAL
PATIENT'S MEMORY ADEQUATE TO SAFELY COMPLETE DAILY ACTIVITIES?: YES
FEEDING YOURSELF: INDEPENDENT
DRESSING YOURSELF: INDEPENDENT
BATHING: INDEPENDENT
HEARING - RIGHT EAR: FUNCTIONAL
JUDGMENT_ADEQUATE_SAFELY_COMPLETE_DAILY_ACTIVITIES: YES
TOILETING: INDEPENDENT

## 2024-09-28 ASSESSMENT — PAIN SCALES - GENERAL
PAINLEVEL_OUTOF10: 5 - MODERATE PAIN
PAINLEVEL_OUTOF10: 6
PAINLEVEL_OUTOF10: 8
PAINLEVEL_OUTOF10: 0 - NO PAIN
PAINLEVEL_OUTOF10: 6
PAINLEVEL_OUTOF10: 7
PAINLEVEL_OUTOF10: 7
PAINLEVEL_OUTOF10: 8

## 2024-09-28 ASSESSMENT — PAIN DESCRIPTION - LOCATION
LOCATION: BACK

## 2024-09-28 ASSESSMENT — PAIN DESCRIPTION - ORIENTATION
ORIENTATION: RIGHT;LEFT;MID;LOWER

## 2024-09-28 ASSESSMENT — PAIN DESCRIPTION - DESCRIPTORS: DESCRIPTORS: SHARP

## 2024-09-28 ASSESSMENT — PAIN DESCRIPTION - ONSET: ONSET: ONGOING

## 2024-09-28 ASSESSMENT — PAIN DESCRIPTION - FREQUENCY: FREQUENCY: CONSTANT/CONTINUOUS

## 2024-09-28 ASSESSMENT — COLUMBIA-SUICIDE SEVERITY RATING SCALE - C-SSRS
1. IN THE PAST MONTH, HAVE YOU WISHED YOU WERE DEAD OR WISHED YOU COULD GO TO SLEEP AND NOT WAKE UP?: NO
6. HAVE YOU EVER DONE ANYTHING, STARTED TO DO ANYTHING, OR PREPARED TO DO ANYTHING TO END YOUR LIFE?: NO
2. HAVE YOU ACTUALLY HAD ANY THOUGHTS OF KILLING YOURSELF?: NO

## 2024-09-28 ASSESSMENT — PAIN DESCRIPTION - PROGRESSION: CLINICAL_PROGRESSION: GRADUALLY IMPROVING

## 2024-09-28 NOTE — H&P
Medical Group History and Physical  ASSESSMENT & PLAN:     Acute on chronic anemia  Rule out upper GI bleed  History of iron deficiency anemia  - Presenting from home with black tarry stools mixed with some blood today along with 2 days of nonbloody emesis is no NSAID use, alcohol use, tobacco smoking) risk factors are only Plavix use at this time  - Hemoglobin 11.9 today (baseline >12)  - Does have a history of iron deficiency anemia in the past requiring infusions  - Follow-up iron, TIBC, ferritin level  - Potential iron sucrose infusion if indicated  - GI consulted  - Protonix 40 mg IV twice daily  - Transfuse if hemoglobin <7    CAD  Hyperlipidemia  Essential hypertension  - Resume home medications once reconciled    Urothelial cancer of the bladder  - Resume oxybutynin, has outpatient follow-up with her urologist    VTE prophylaxis: SCDs (no chemoprophylaxis with concerns for GI bleed)      ---Of note, this documentation is completed using the Dragon Dictation system (voice recognition software). There may be spelling and/or grammatical errors that were not corrected prior to final submission.---    Glenn Aggarwal MD    HISTORY OF PRESENT ILLNESS:   Chief Complaint: Black tarry stool    History Of Present Illness:    Cuca Laguerre is a 63 y.o. female with a significant past medical history of urothelial carcinoma of the bladder, iron deficiency anemia, CAD, hypertension, hyperlipidemia that presented from home with chief complaints of 2 days of nausea as well as 1 day of black stools.  Patient states that today she had oatmeal consistency black-colored stools mixed with some blood.  She also had the last 2 days of vomiting and nausea but no blood in her vomit.  Denies having any chest pain, shortness of breath.  Also endorsing lower back pain.  Denies having any recent NSAID use.    ED course:  - Vitals: Temperature 97.7, HR 61, /85, RR 17 saturating 97% on room air  - Labs: Unremarkable CMP.   Hemoglobin 11.7.  Urinalysis grossly unremarkable.  - Imaging: CT abdomen/pelvis without IV contrast with no acute findings.  Right 2.4 cm and left 1.9 cm low density adrenal gland lesions stable from previous exam.     Review of systems: 10 point review of systems is otherwise negative except as mentioned above.    PAST HISTORIES:     Past Medical History:  She has a past medical history of Anxiety, Bladder cancer (Multi), Chronic pain disorder, Coronary artery disease, Diabetes mellitus (Multi), Fibromyalgia, primary, Heart disease, Hyperlipidemia, Hypertension, Lumbar disc disease, and Myocardial infarction (Multi).    Past Surgical History:  She has a past surgical history that includes Tonsillectomy (04/01/2014); Hysterectomy (04/01/2014); Other surgical history (09/15/2022); Other surgical history (07/25/2022); Other surgical history (06/15/2022); Other surgical history (06/15/2022); Other surgical history (06/15/2022); Other surgical history (07/25/2022); MR angio head wo IV contrast (1/29/2023); and CT angio head w and wo IV contrast (1/30/2023).      Social History: Previous tobacco smoker, quit in 2019, unknown exact pack-year history.  Consumes alcohol rarely.  Denies illicit drug use.  Lives at home with her daughter.  Relatively independent ADLs.  Former registered nurse.    Family History:  Family History   Problem Relation Name Age of Onset    Dementia Mother      Other (cardiac disorder) Father      Diabetes Father      Diabetes Brother          Allergies: Iodides, Iodinated contrast media, Erythromycin, Gabapentin, Moxifloxacin, Pregabalin, Metformin, and Sulfa (sulfonamide antibiotics)    OBJECTIVE:     Last Recorded Vitals:  Vitals:    09/28/24 1155 09/28/24 1257 09/28/24 1352 09/28/24 1441   BP: (!) 207/85 (!) 206/73 (!) 183/87 162/60   BP Location:  Right arm Left arm Left arm   Patient Position:  Lying Lying Lying   Pulse: 61 67 52 62   Resp: 17 18 18 16   Temp: 36.5 °C (97.7 °F)      SpO2: 97%  "97% 99% 100%   Weight: 69.9 kg (154 lb)      Height: 1.626 m (5' 4\")        Last I/O:  No intake/output data recorded.    Physical Exam  Vitals reviewed.   Constitutional:       General: She is not in acute distress.     Appearance: Normal appearance. She is not toxic-appearing.   HENT:      Head: Normocephalic and atraumatic.      Nose: Nose normal.      Mouth/Throat:      Mouth: Mucous membranes are moist.      Pharynx: Oropharynx is clear.      Comments: Dentition okay.  Eyes:      Extraocular Movements: Extraocular movements intact.      Conjunctiva/sclera: Conjunctivae normal.      Pupils: Pupils are equal, round, and reactive to light.      Comments: Right upper eyelid lesion present.   Cardiovascular:      Rate and Rhythm: Normal rate and regular rhythm.      Pulses: Normal pulses.      Heart sounds: Normal heart sounds.   Pulmonary:      Effort: Pulmonary effort is normal. No respiratory distress.      Breath sounds: Normal breath sounds. No wheezing.   Abdominal:      General: Bowel sounds are normal. There is no distension.      Palpations: Abdomen is soft.      Tenderness: There is no abdominal tenderness. There is no guarding.   Musculoskeletal:         General: Normal range of motion.      Cervical back: Normal range of motion and neck supple.      Comments: Negative bilateral straight leg test.  Lower back pain with right-sided straight leg however does not shoot down her leg.   Neurological:      General: No focal deficit present.      Mental Status: She is alert and oriented to person, place, and time. Mental status is at baseline.   Psychiatric:         Mood and Affect: Mood normal.         Behavior: Behavior normal.         Thought Content: Thought content normal.       Scheduled Medications    PRN Medications    Continuous Medications      Outpatient Medications:  Prior to Admission medications    Medication Sig Start Date End Date Taking? Authorizing Provider   acarbose (Precose) 25 mg tablet " Take 1 tablet by mouth 3 times daily (with meals) 9/30/21   Historical Provider, MD   amitriptyline (Elavil) 25 mg tablet Take 1 tablet (25 mg) by mouth once daily at bedtime.    Historical Provider, MD   atorvastatin (Lipitor) 80 mg tablet Take 1 tablet (80 mg) by mouth once daily at bedtime. 2/29/24   Sharifa Bull MD   blood sugar diagnostic strip 1 each by In Vitro route daily As needed. 9/30/21   Historical Provider, MD   cholecalciferol, vitamin D3, 25 mcg (1,000 unit) tablet,chewable Chew 1 tablet (25 mcg) once daily.    Historical Provider, MD   clonazePAM (KlonoPIN) 0.5 mg tablet TAKE ONE TABLET BY MOUTH EVERY DAY AT BEDTIME AS TOLERATED    Historical Provider, MD   clopidogrel (Plavix) 75 mg tablet Take 1 tablet (75 mg) by mouth once daily. 5/17/24 5/17/25  Edmond Crockett,    furosemide (Lasix) 40 mg tablet Take 2 tablets (80 mg) by mouth once daily. PRN 4/18/23   Historical Provider, MD   horse chestnut 300 mg capsule Take 1 capsule by mouth 2 times a day. TAKE 1 CAPSULE Every twelve hours PRN    Historical Provider, MD   losartan (Cozaar) 50 mg tablet Take 1 tablet (50 mg) by mouth 2 times a day. 11/3/23 11/2/24  Jj Bernard MD   magnesium oxide (Mag-Ox) 400 mg (241.3 mg magnesium) tablet Take 1 tablet by mouth daily    Historical Provider, MD   metoprolol tartrate (Lopressor) 50 mg tablet Take 1 tablet by mouth 2 times a day. 8/1/24 8/1/25  Jj Bernard MD   nitroglycerin (Nitrostat) 0.4 mg SL tablet PLACE 1 TABLET UNDER THE TONGUE EVERY 5 MINUTES FOR UP TO 3 DOSES AS NEEDED FOR CHEST PAIN    Historical Provider, MD   oxybutynin XL (Ditropan-XL) 10 mg 24 hr tablet Take 1 tablet (10 mg) by mouth once daily. Do not crush, chew, or split. 9/26/24 9/21/25  Lee Pitt MD   oxyCODONE-acetaminophen (Percocet) 5-325 mg tablet Take 1 tablet by mouth every 4 hours if needed for moderate pain (4 - 6). 3/13/24   Lee Pitt MD   potassium chloride CR 20 mEq ER tablet  TAKE ONE TABLET BY MOUTH DAILY AS NEEDED WHEN TAKING FUROSEMIDE 1/10/22   Historical Provider, MD   spironolactone (Aldactone) 50 mg tablet Take 1 tablet (50 mg) by mouth once daily. 11/3/23 11/2/24  Jj Bernard MD   tiZANidine (Zanaflex) 4 mg tablet Take 1 tablet (4 mg) by mouth every 8 hours if needed for muscle spasms. 6/26/21   Historical Provider, MD   topiramate (Topamax) 50 mg tablet Take 1 tablet (50 mg) by mouth once daily.    Historical Provider, MD       LABS AND IMAGING:     Labs:  Results from last 7 days   Lab Units 09/28/24  1237   WBC AUTO x10*3/uL 9.4   RBC AUTO x10*6/uL 4.11   HEMOGLOBIN g/dL 11.7*   HEMATOCRIT % 36.5   MCV fL 89   MCH pg 28.5   MCHC g/dL 32.1   RDW % 14.5   PLATELETS AUTO x10*3/uL 201     Results from last 7 days   Lab Units 09/28/24  1237   SODIUM mmol/L 138   POTASSIUM mmol/L 4.3   CHLORIDE mmol/L 108*   CO2 mmol/L 23   BUN mg/dL 15   CREATININE mg/dL 0.74   GLUCOSE mg/dL 115*   PROTEIN TOTAL g/dL 6.7   CALCIUM mg/dL 9.3   BILIRUBIN TOTAL mg/dL 0.6   ALK PHOS U/L 52   AST U/L 8*   ALT U/L 6*               Imaging:  CT abdomen pelvis wo IV contrast  Narrative: STUDY:  CT Abdomen and Pelvis without IV Contrast; 9/28/2024 1:21 PM  INDICATION:  GI bleed.  COMPARISON:  CT AP 8/27/2022.  ACCESSION NUMBER(S):  [id number]  ORDERING CLINICIAN:  JANIA BUTLER  TECHNIQUE:  CT of the abdomen and pelvis was performed.  Contiguous axial images  were obtained at 3 mm slice thickness through the abdomen and pelvis.   Coronal and sagittal reconstructions at 3 mm slice thickness were  performed. No intravenous contrast was administered.    Automated mA/kV exposure control was utilized and patient examination  was performed in strict accordance with principles of ALARA.  FINDINGS:  Please note that the evaluation of vessels, lymph nodes and organs is  limited without intravenous contrast.      LOWER CHEST:  No cardiomegaly.  No pericardial effusion.  Lung bases are clear.      ABDOMEN:     LIVER:  No hepatomegaly.  Smooth surface contour.  Normal attenuation.     BILE DUCTS:  No intrahepatic or extrahepatic biliary ductal dilatation.     GALLBLADDER:  The gallbladder is visualized.  No radiopaque calculi.  STOMACH:  No abnormalities identified.     PANCREAS:  No masses or ductal dilatation.     SPLEEN:  No splenomegaly or focal splenic lesion.     ADRENAL GLANDS:  There is a 2.4 cm low-density lesion within the right adrenal gland  likely representing an adenoma (201-20).  There is a 1.9 cm left  adrenal lesion which also likely represents an adenoma.  These are  relatively stable when compared with the previous CT from 2022.     KIDNEYS AND URETERS:  Kidneys are normal in size and location.  There is a 2 mm  subcentimeter calcification within the hilum of the left kidney which  could represent a renal vascular calcification versus small calculus.   No hydronephrosis bilaterally.  No definite ureteral calculi.     PELVIS:     BLADDER:  There is gas noted within the anterior aspect of the bladder which may  be iatrogenic in etiology versus changes from gas-forming organism.     REPRODUCTIVE ORGANS:  No abnormalities identified.     BOWEL:  There is no evidence of bowel wall thickening or dilatation to suggest  mechanical obstruction.  There is colonic diverticulosis noted without  definite CT evidence of diverticulitis.  The appendix is surgically  absent.  VESSELS:  No abnormalities identified.  Abdominal aorta is normal in caliber.   There are calcific atherosclerotic changes of the infrarenal abdominal  aorta and iliac vessels.     PERITONEUM/RETROPERITONEUM/LYMPH NODES:  There is a scant amount of free pelvic fluid.  No pneumoperitoneum.   There is no evidence of significant abdominal or pelvic  lymphadenopathy by CT size criteria though evaluation is limited  without intravenous contrast.  ABDOMINAL WALL:  No abnormalities identified.  SOFT TISSUES:   No abnormalities identified.      BONES:  No acute fracture or aggressive osseous lesion.  Impression: 1.  There is no definite bowel wall thickening or dilatation to  suggest mechanical obstruction.  2.  Colonic diverticulosis.  3.  There is a scant amount of free pelvic fluid.  No  pneumoperitoneum. Other findings as stated above..  Signed by Nacho Faustin MD

## 2024-09-28 NOTE — ED PROVIDER NOTES
HPI   Chief Complaint   Patient presents with    Black or Bloody Stool     Started today with black bloody and clots on thinners for stent        63-year-old female presents emergency department, patient states she has not been feeling well for a couple days, but having some nausea and some generalized abdominal discomfort.  States this morning she woke up and had a bowel movement, describes it as oatmeal consistency and black in color with some bright red blood mixed in.  When she wiped there were several fresh blood clots as well.    Denies any history of GI bleeding or gastric ulcers but states all week she felt like she might be developing an ulcer.  Non-smoker, nondrinker, does not use a lot of NSAIDs.  Notes that she is on Plavix      History provided by:  Patient   used: No            Patient History   Past Medical History:   Diagnosis Date    Anxiety     Bladder cancer (Multi)     Chronic pain disorder     Coronary artery disease     Diabetes mellitus (Multi)     Fibromyalgia, primary     Heart disease     Hyperlipidemia     Hypertension     Lumbar disc disease     Myocardial infarction (Multi)      Past Surgical History:   Procedure Laterality Date    CT HEAD ANGIO W AND WO IV CONTRAST  1/30/2023    CT HEAD ANGIO W AND WO IV CONTRAST 1/30/2023 [facility]    HYSTERECTOMY  04/01/2014    Hysterectomy    MR HEAD ANGIO WO IV CONTRAST  1/29/2023    MR HEAD ANGIO WO IV CONTRAST 1/29/2023 [facility]CTOR [facility]    OTHER SURGICAL HISTORY  09/15/2022    Appendectomy    OTHER SURGICAL HISTORY  07/25/2022    Transurethral resection of bladder tumor    OTHER SURGICAL HISTORY  06/15/2022    Surgery    OTHER SURGICAL HISTORY  06/15/2022    Colonoscopy    OTHER SURGICAL HISTORY  06/15/2022    Ulnar nerve transposition    OTHER SURGICAL HISTORY  07/25/2022    Shoulder surgery    TONSILLECTOMY  04/01/2014    Tonsillectomy     Family History   Problem Relation Name Age of Onset    Dementia Mother       Other (cardiac disorder) Father      Diabetes Father      Diabetes Brother       Social History     Tobacco Use    Smoking status: Former     Current packs/day: 0.00     Average packs/day: 0.5 packs/day for 10.0 years (5.0 ttl pk-yrs)     Types: Cigarettes     Start date: 2009     Quit date: 2019     Years since quittin.9    Smokeless tobacco: Never   Vaping Use    Vaping status: Never Used   Substance Use Topics    Alcohol use: Yes     Comment: socially    Drug use: Never       Physical Exam   ED Triage Vitals [24 1155]   Temperature Heart Rate Respirations BP   36.5 °C (97.7 °F) 61 17 (!) 207/85      Pulse Ox Temp src Heart Rate Source Patient Position   97 % -- -- --      BP Location FiO2 (%)     -- --       Physical Exam  Physical Exam:  Constitutional: Vitals noted, no distress. Afebrile.   Cardiovascular: Regular, rate, rhythm, no murmur.   Pulmonary: Lungs clear bilaterally with good aeration. No adventitious breath sounds.   Gastrointestinal: Soft, nonsurgical. Nontender. No peritoneal signs. Normoactive bowel sounds.   Musculoskeletal: No peripheral edema. Negative Homans bilaterally, no cords.   Skin: No rash.   Neuro: No focal neurologic deficits, NIH score of 0.      ED Course & MDM   Diagnoses as of 24 1520   Gastrointestinal hemorrhage, unspecified gastrointestinal hemorrhage type     Labs Reviewed   CBC WITH AUTO DIFFERENTIAL - Abnormal       Result Value    WBC 9.4      nRBC 0.0      RBC 4.11      Hemoglobin 11.7 (*)     Hematocrit 36.5      MCV 89      MCH 28.5      MCHC 32.1      RDW 14.5      Platelets 201      Neutrophils % 82.2      Immature Granulocytes %, Automated 0.3      Lymphocytes % 11.0      Monocytes % 6.1      Eosinophils % 0.1      Basophils % 0.3      Neutrophils Absolute 7.71 (*)     Immature Granulocytes Absolute, Automated 0.03      Lymphocytes Absolute 1.03 (*)     Monocytes Absolute 0.57      Eosinophils Absolute 0.01      Basophils Absolute 0.03      BASIC METABOLIC PANEL - Abnormal    Glucose 115 (*)     Sodium 138      Potassium 4.3      Chloride 108 (*)     Bicarbonate 23      Anion Gap 11      Urea Nitrogen 15      Creatinine 0.74      eGFR >90      Calcium 9.3     HEPATIC FUNCTION PANEL - Abnormal    Albumin 4.1      Bilirubin, Total 0.6      Bilirubin, Direct 0.1      Alkaline Phosphatase 52      ALT 6 (*)     AST 8 (*)     Total Protein 6.7     PROTIME-INR - Abnormal    Protime 12.9 (*)     INR 1.1     LIPASE - Normal    Lipase 40      Narrative:     Venipuncture immediately after or during the administration of Metamizole may lead to falsely low results. Testing should be performed immediately prior to Metamizole dosing.   LACTATE - Normal    Lactate 0.6      Narrative:     Venipuncture immediately after or during the administration of Metamizole may lead to falsely low results. Testing should be performed immediately prior to Metamizole dosing.        CT abdomen pelvis wo IV contrast   Final Result   1.  There is no definite bowel wall thickening or dilatation to   suggest mechanical obstruction.   2.  Colonic diverticulosis.   3.  There is a scant amount of free pelvic fluid.  No   pneumoperitoneum. Other findings as stated above..   Signed by Nacho Faustin MD                    No data recorded     Baldemar Coma Scale Score: 15 (09/28/24 1155 : Latricia Lam, RN)                   Medical Decision Making    Workup initiated, patient given IV fluids, 80 mg of pantoprazole, 4 mg of IV Zofran, 4 mg of IV morphine.    CBC with a hemoglobin of 11.7, most recently 13.5 and 14.  Metabolic panel unremarkable, normal lactate and lipase.    Dry skin was obtained the patient has severe allergy to contrast.    Continuing to feel very nauseated so was additionally given Phenergan 12.5 mg, and had more pains was given morphine 4 mg.    Ultrasound of the abdomen/pelvis was relatively unremarkable.    Hemoccult was obtained, there was very little substance present in  the rectal vault, although small amount obtained, which was significantly positive.    Discussed hospitalization with the hospitalist for GI bleed, further monitoring, possible GI consult.  Patient admitted to the hospitalist service    Procedure  Procedures     Neeta Ulloa, JOSS-MASSIMO  09/28/24 1524

## 2024-09-29 VITALS
DIASTOLIC BLOOD PRESSURE: 77 MMHG | HEIGHT: 64 IN | SYSTOLIC BLOOD PRESSURE: 184 MMHG | BODY MASS INDEX: 26.29 KG/M2 | RESPIRATION RATE: 17 BRPM | TEMPERATURE: 97.7 F | OXYGEN SATURATION: 97 % | HEART RATE: 54 BPM | WEIGHT: 154 LBS

## 2024-09-29 DIAGNOSIS — K92.1 MELENA: ICD-10-CM

## 2024-09-29 DIAGNOSIS — K92.1 HEMATOCHEZIA: Primary | ICD-10-CM

## 2024-09-29 LAB
ANION GAP SERPL CALC-SCNC: 11 MMOL/L (ref 10–20)
BASOPHILS # BLD AUTO: 0.04 X10*3/UL (ref 0–0.1)
BASOPHILS NFR BLD AUTO: 0.5 %
BUN SERPL-MCNC: 11 MG/DL (ref 6–23)
CALCIUM SERPL-MCNC: 9.4 MG/DL (ref 8.6–10.3)
CHLORIDE SERPL-SCNC: 110 MMOL/L (ref 98–107)
CO2 SERPL-SCNC: 24 MMOL/L (ref 21–32)
CREAT SERPL-MCNC: 0.87 MG/DL (ref 0.5–1.05)
EGFRCR SERPLBLD CKD-EPI 2021: 75 ML/MIN/1.73M*2
EOSINOPHIL # BLD AUTO: 0.04 X10*3/UL (ref 0–0.7)
EOSINOPHIL NFR BLD AUTO: 0.5 %
ERYTHROCYTE [DISTWIDTH] IN BLOOD BY AUTOMATED COUNT: 14.6 % (ref 11.5–14.5)
GLUCOSE SERPL-MCNC: 89 MG/DL (ref 74–99)
HCT VFR BLD AUTO: 38.3 % (ref 36–46)
HGB BLD-MCNC: 11.9 G/DL (ref 12–16)
HOLD SPECIMEN: NORMAL
IMM GRANULOCYTES # BLD AUTO: 0.02 X10*3/UL (ref 0–0.7)
IMM GRANULOCYTES NFR BLD AUTO: 0.3 % (ref 0–0.9)
LYMPHOCYTES # BLD AUTO: 1.54 X10*3/UL (ref 1.2–4.8)
LYMPHOCYTES NFR BLD AUTO: 21 %
MAGNESIUM SERPL-MCNC: 2.13 MG/DL (ref 1.6–2.4)
MCH RBC QN AUTO: 28 PG (ref 26–34)
MCHC RBC AUTO-ENTMCNC: 31.1 G/DL (ref 32–36)
MCV RBC AUTO: 90 FL (ref 80–100)
MONOCYTES # BLD AUTO: 0.5 X10*3/UL (ref 0.1–1)
MONOCYTES NFR BLD AUTO: 6.8 %
NEUTROPHILS # BLD AUTO: 5.21 X10*3/UL (ref 1.2–7.7)
NEUTROPHILS NFR BLD AUTO: 70.9 %
NRBC BLD-RTO: 0 /100 WBCS (ref 0–0)
PLATELET # BLD AUTO: 174 X10*3/UL (ref 150–450)
POTASSIUM SERPL-SCNC: 4.5 MMOL/L (ref 3.5–5.3)
RBC # BLD AUTO: 4.25 X10*6/UL (ref 4–5.2)
SODIUM SERPL-SCNC: 140 MMOL/L (ref 136–145)
WBC # BLD AUTO: 7.4 X10*3/UL (ref 4.4–11.3)

## 2024-09-29 PROCEDURE — 36415 COLL VENOUS BLD VENIPUNCTURE: CPT | Performed by: STUDENT IN AN ORGANIZED HEALTH CARE EDUCATION/TRAINING PROGRAM

## 2024-09-29 PROCEDURE — 80048 BASIC METABOLIC PNL TOTAL CA: CPT | Performed by: STUDENT IN AN ORGANIZED HEALTH CARE EDUCATION/TRAINING PROGRAM

## 2024-09-29 PROCEDURE — 96375 TX/PRO/DX INJ NEW DRUG ADDON: CPT

## 2024-09-29 PROCEDURE — G0378 HOSPITAL OBSERVATION PER HR: HCPCS

## 2024-09-29 PROCEDURE — 2500000004 HC RX 250 GENERAL PHARMACY W/ HCPCS (ALT 636 FOR OP/ED): Performed by: NURSE PRACTITIONER

## 2024-09-29 PROCEDURE — 96376 TX/PRO/DX INJ SAME DRUG ADON: CPT

## 2024-09-29 PROCEDURE — 85025 COMPLETE CBC W/AUTO DIFF WBC: CPT | Performed by: STUDENT IN AN ORGANIZED HEALTH CARE EDUCATION/TRAINING PROGRAM

## 2024-09-29 PROCEDURE — 2500000002 HC RX 250 W HCPCS SELF ADMINISTERED DRUGS (ALT 637 FOR MEDICARE OP, ALT 636 FOR OP/ED): Performed by: STUDENT IN AN ORGANIZED HEALTH CARE EDUCATION/TRAINING PROGRAM

## 2024-09-29 PROCEDURE — 2500000004 HC RX 250 GENERAL PHARMACY W/ HCPCS (ALT 636 FOR OP/ED): Performed by: STUDENT IN AN ORGANIZED HEALTH CARE EDUCATION/TRAINING PROGRAM

## 2024-09-29 PROCEDURE — 99239 HOSP IP/OBS DSCHRG MGMT >30: CPT | Performed by: STUDENT IN AN ORGANIZED HEALTH CARE EDUCATION/TRAINING PROGRAM

## 2024-09-29 PROCEDURE — 83735 ASSAY OF MAGNESIUM: CPT | Performed by: STUDENT IN AN ORGANIZED HEALTH CARE EDUCATION/TRAINING PROGRAM

## 2024-09-29 PROCEDURE — 2500000001 HC RX 250 WO HCPCS SELF ADMINISTERED DRUGS (ALT 637 FOR MEDICARE OP): Performed by: NURSE PRACTITIONER

## 2024-09-29 PROCEDURE — 2500000001 HC RX 250 WO HCPCS SELF ADMINISTERED DRUGS (ALT 637 FOR MEDICARE OP): Performed by: STUDENT IN AN ORGANIZED HEALTH CARE EDUCATION/TRAINING PROGRAM

## 2024-09-29 PROCEDURE — 99223 1ST HOSP IP/OBS HIGH 75: CPT | Performed by: PHYSICIAN ASSISTANT

## 2024-09-29 RX ORDER — OXYCODONE AND ACETAMINOPHEN 5; 325 MG/1; MG/1
1 TABLET ORAL EVERY 4 HOURS PRN
Status: DISCONTINUED | OUTPATIENT
Start: 2024-09-29 | End: 2024-09-29 | Stop reason: HOSPADM

## 2024-09-29 RX ORDER — OXYBUTYNIN CHLORIDE 5 MG/1
5 TABLET ORAL 2 TIMES DAILY
Status: DISCONTINUED | OUTPATIENT
Start: 2024-09-29 | End: 2024-09-29 | Stop reason: HOSPADM

## 2024-09-29 RX ORDER — ATORVASTATIN CALCIUM 80 MG/1
80 TABLET, FILM COATED ORAL NIGHTLY
Status: DISCONTINUED | OUTPATIENT
Start: 2024-09-29 | End: 2024-09-29 | Stop reason: HOSPADM

## 2024-09-29 RX ORDER — AMITRIPTYLINE HYDROCHLORIDE 25 MG/1
25 TABLET, FILM COATED ORAL NIGHTLY
Status: DISCONTINUED | OUTPATIENT
Start: 2024-09-29 | End: 2024-09-29 | Stop reason: HOSPADM

## 2024-09-29 RX ORDER — PANTOPRAZOLE SODIUM 40 MG/1
40 TABLET, DELAYED RELEASE ORAL
Qty: 30 TABLET | Refills: 3 | Status: SHIPPED | OUTPATIENT
Start: 2024-09-29 | End: 2024-10-29

## 2024-09-29 RX ORDER — METOPROLOL TARTRATE 50 MG/1
50 TABLET ORAL 2 TIMES DAILY
Status: DISCONTINUED | OUTPATIENT
Start: 2024-09-29 | End: 2024-09-29 | Stop reason: HOSPADM

## 2024-09-29 RX ADMIN — OXYCODONE HYDROCHLORIDE 5 MG: 5 TABLET ORAL at 06:50

## 2024-09-29 RX ADMIN — PANTOPRAZOLE SODIUM 40 MG: 40 INJECTION, POWDER, FOR SOLUTION INTRAVENOUS at 09:33

## 2024-09-29 RX ADMIN — OXYCODONE HYDROCHLORIDE AND ACETAMINOPHEN 1 TABLET: 5; 325 TABLET ORAL at 12:37

## 2024-09-29 RX ADMIN — METOPROLOL TARTRATE 50 MG: 50 TABLET, FILM COATED ORAL at 09:33

## 2024-09-29 RX ADMIN — IRON SUCROSE 200 MG: 20 INJECTION, SOLUTION INTRAVENOUS at 09:33

## 2024-09-29 RX ADMIN — ONDANSETRON 4 MG: 2 INJECTION INTRAMUSCULAR; INTRAVENOUS at 04:47

## 2024-09-29 RX ADMIN — OXYBUTYNIN CHLORIDE 5 MG: 5 TABLET ORAL at 09:33

## 2024-09-29 ASSESSMENT — ENCOUNTER SYMPTOMS
SORE THROAT: 0
CONFUSION: 0
ARTHRALGIAS: 0
DIZZINESS: 0
FEVER: 0
APPETITE CHANGE: 0
EYE PAIN: 0
TROUBLE SWALLOWING: 0
WEAKNESS: 0
MYALGIAS: 0
HEADACHES: 0
COUGH: 0
SHORTNESS OF BREATH: 0
UNEXPECTED WEIGHT CHANGE: 0
HEMATURIA: 0
JOINT SWELLING: 0
WHEEZING: 0
CHILLS: 0
DYSURIA: 0
NUMBNESS: 0

## 2024-09-29 ASSESSMENT — COGNITIVE AND FUNCTIONAL STATUS - GENERAL
MOBILITY SCORE: 24
DAILY ACTIVITIY SCORE: 24

## 2024-09-29 ASSESSMENT — PAIN SCALES - GENERAL
PAINLEVEL_OUTOF10: 10 - WORST POSSIBLE PAIN
PAINLEVEL_OUTOF10: 0 - NO PAIN

## 2024-09-29 NOTE — CARE PLAN
The patient's goals for the shift include      The clinical goals for the shift include Hemodynamically stable      Problem: Skin  Goal: Decreased wound size/increased tissue granulation at next dressing change  Outcome: Progressing  Goal: Participates in plan/prevention/treatment measures  Outcome: Progressing  Goal: Prevent/manage excess moisture  Outcome: Progressing  Goal: Prevent/minimize sheer/friction injuries  Outcome: Progressing  Goal: Promote/optimize nutrition  Outcome: Progressing  Goal: Promote skin healing  Outcome: Progressing     Problem: Pain - Adult  Goal: Verbalizes/displays adequate comfort level or baseline comfort level  Outcome: Progressing     Problem: Safety - Adult  Goal: Free from fall injury  Outcome: Progressing     Problem: Discharge Planning  Goal: Discharge to home or other facility with appropriate resources  Outcome: Progressing     Problem: Chronic Conditions and Co-morbidities  Goal: Patient's chronic conditions and co-morbidity symptoms are monitored and maintained or improved  Outcome: Progressing     Problem: Pain  Goal: Takes deep breaths with improved pain control throughout the shift  Outcome: Progressing  Goal: Turns in bed with improved pain control throughout the shift  Outcome: Progressing  Goal: Walks with improved pain control throughout the shift  Outcome: Progressing  Goal: Performs ADL's with improved pain control throughout shift  Outcome: Progressing  Goal: Participates in PT with improved pain control throughout the shift  Outcome: Progressing  Goal: Free from opioid side effects throughout the shift  Outcome: Progressing  Goal: Free from acute confusion related to pain meds throughout the shift  Outcome: Progressing     Problem: Diabetes  Goal: Achieve decreasing blood glucose levels by end of shift  Outcome: Progressing  Goal: Increase stability of blood glucose readings by end of shift  Outcome: Progressing  Goal: Decrease in ketones present in urine by end  of shift  Outcome: Progressing  Goal: Maintain electrolyte levels within acceptable range throughout shift  Outcome: Progressing  Goal: Maintain glucose levels >70mg/dl to <250mg/dl throughout shift  Outcome: Progressing  Goal: No changes in neurological exam by end of shift  Outcome: Progressing  Goal: Learn about and adhere to nutrition recommendations by end of shift  Outcome: Progressing

## 2024-09-29 NOTE — CARE PLAN
The patient's goals for the shift include      The clinical goals for the shift include Hemodynamically stable    Over the shift, the patient did not make progress toward the following goals. Barriers to progression include . Recommendations to address these barriers include .

## 2024-09-29 NOTE — CONSULTS
Department of Internal Medicine  Gastroenterology  Consult note      Reason for Consult: GI bleed    Chief Complaint: Melena    History Obtained from: chart review and patient reports    History of Present Illness      The patient is a 63 y.o. female with signficant past medical history of iron deficiency anemia, hyperlipidemia, hypertension, CAD, and urothelial cancer who presents for melena/hematochezia.    Yesterday she started passing black soft stool followed by bright red blood with clots.  She had about 3-4 episodes and then it stopped.  It was associated with nausea and vomiting over the last 2 to 3 days where she was having 2-3 episodes a day.  She denies any hematemesis or coffee-ground emesis.  She also endorses low back pain.    She denies any abdominal pain, constipation, odynophagia, dysphagia, acid reflux, or diarrhea.  She typically moves her bowels once daily of normal caliber and color.    Denies use of tobacco, alcohol, and recreational drugs. No recent changes in medication. Denies use of daily NSAIDs and Tylenol.  She is on Plavix with her last dose being yesterday, 9/28 AM    Abdominal surgeries include appendectomy and hysterectomy    Allergies  Iodides, Iodinated contrast media, Erythromycin, Gabapentin, Moxifloxacin, Pregabalin, Metformin, and Sulfa (sulfonamide antibiotics)    Current Medication    Current Facility-Administered Medications:     acetaminophen (Tylenol) tablet 650 mg, 650 mg, oral, q4h PRN **OR** acetaminophen (Tylenol) oral liquid 650 mg, 650 mg, nasogastric tube, q4h PRN **OR** acetaminophen (Tylenol) suppository 650 mg, 650 mg, rectal, q4h PRN, Glenn Aggarwal MD    metoprolol tartrate (Lopressor) tablet 50 mg, 50 mg, oral, BID, JOSS Mendez-CNP    ondansetron (Zofran) injection 4 mg, 4 mg, intravenous, q6h PRN, DANN Mendez, 4 mg at 09/29/24 8167    oxyCODONE (Roxicodone) immediate release tablet 5 mg, 5 mg, oral, q8h PRN, DANN Mendez, 5  mg at 09/29/24 0650    pantoprazole (ProtoNix) injection 40 mg, 40 mg, intravenous, BID, Glenn Aggarwal MD, 40 mg at 09/28/24 2032    polyethylene glycol (Glycolax, Miralax) packet 17 g, 17 g, oral, Daily PRN, Glenn Aggarwal MD    Past Medical History  Active Ambulatory Problems     Diagnosis Date Noted    Anemia 10/19/2023    Blood loss anemia 10/19/2023    Anxiety 10/19/2023    Depressive disorder 05/18/2012    AVM (arteriovenous malformation) of small bowel, acquired 10/19/2023    Biceps tendon tear 10/19/2023    Bilateral leg weakness 10/19/2023    CAD S/P percutaneous coronary angioplasty 10/19/2023    Carotid artery bruit 10/19/2023    Chest pain 10/19/2023    Chronic venous insufficiency 10/19/2023    Cubital tunnel syndrome 10/19/2023    DDD (degenerative disc disease), lumbar 05/20/2015    Major depression 10/19/2023    Diabetes mellitus (Multi) 10/19/2023    Edema 10/19/2023    Elevated liver enzymes 10/19/2023    Essential hypertension, benign 05/18/2012    Fibromyalgia 10/19/2023    Generalized arthritis 10/19/2023    H/O colonoscopy 10/19/2023    Hand pain 10/19/2023    Heart disease 10/19/2023    History of bladder cancer 07/17/2023    Hematoma of groin 10/19/2023    History of myocardial infarction 10/19/2023    Iliotibial band syndrome 10/19/2023    Adhesive capsulitis of left shoulder 10/19/2023    Impingement syndrome of left shoulder 10/19/2023    Insomnia 10/19/2023    Low back pain 05/20/2015    Iron deficiency anemia due to dietary causes 01/22/2019    Iron deficiency anemia 10/19/2023    Low hemoglobin 10/19/2023    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HHS-HCC) 02/19/2019    Hypercholesterolemia 10/19/2023    Mixed hyperlipidemia 10/19/2023    Muscle spasm 10/19/2023    Numbness and tingling 10/19/2023    Obese 10/19/2023    Severe obesity (BMI 35.0-39.9) with comorbidity (Multi) 04/18/2023    Other acquired hemolytic anemias (Multi) 01/22/2019    Pain 10/19/2023    Palpitations 10/19/2023    Rotator cuff  tear 10/19/2023    Stiffness of left shoulder, not elsewhere classified 10/19/2023    Superior glenoid labrum lesion of shoulder 10/19/2023    Swelling of shoulder joint 10/19/2023    Tear of left biceps muscle 10/19/2023    Type 2 diabetes mellitus without complication, without long-term current use of insulin (Multi) 10/04/2011    Ulnar neuropathy 05/20/2015    Urothelial carcinoma (Multi) 04/18/2023    Vitamin D deficiency 10/19/2023    Wound drainage 10/19/2023    Class 1 obesity with body mass index (BMI) of 32.0 to 32.9 in adult 10/19/2023    H/O non-ST elevation myocardial infarction (NSTEMI) 10/19/2023    Perioperative pain 10/19/2023    BMI 33.0-33.9,adult 11/03/2023    Former cigarette smoker 11/03/2023    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    Urinary frequency 09/26/2024     Resolved Ambulatory Problems     Diagnosis Date Noted    Coronary artery disease with angina pectoris (CMS-HCC) 10/19/2023     Past Medical History:   Diagnosis Date    Bladder cancer (Multi)     Chronic pain disorder     Coronary artery disease     Fibromyalgia, primary     Hyperlipidemia     Hypertension     Lumbar disc disease     Myocardial infarction (Multi)        Past Surgical History  Past Surgical History:   Procedure Laterality Date    CT HEAD ANGIO W AND WO IV CONTRAST  1/30/2023    CT HEAD ANGIO W AND WO IV CONTRAST 1/30/2023 [facility]    HYSTERECTOMY  04/01/2014    Hysterectomy    MR HEAD ANGIO WO IV CONTRAST  1/29/2023    MR HEAD ANGIO WO IV CONTRAST 1/29/2023 [facility]CTOR [facility]    OTHER SURGICAL HISTORY  09/15/2022    Appendectomy    OTHER SURGICAL HISTORY  07/25/2022    Transurethral resection of bladder tumor    OTHER SURGICAL HISTORY  06/15/2022    Surgery    OTHER SURGICAL HISTORY  06/15/2022    Colonoscopy    OTHER SURGICAL HISTORY  06/15/2022    Ulnar nerve transposition    OTHER SURGICAL HISTORY  07/25/2022    Shoulder surgery    TONSILLECTOMY  04/01/2014    Tonsillectomy  "      Family History  Family History   Problem Relation Name Age of Onset    Dementia Mother      Other (cardiac disorder) Father      Diabetes Father      Diabetes Brother       No family history of stomach or colon cancer    Social History  TOBACCO:  reports that she quit smoking about 4 years ago. Her smoking use included cigarettes. She started smoking about 14 years ago. She has a 5 pack-year smoking history. She has never used smokeless tobacco.  ETOH:  reports current alcohol use.  DRUGS:  reports no history of drug use.  MARITAL STATUS:   OCCUPATION:    Review of Systems  Review of Systems   Constitutional:  Negative for appetite change, chills, fever and unexpected weight change.   HENT:  Negative for mouth sores, sore throat and trouble swallowing.    Eyes:  Negative for pain and visual disturbance.   Respiratory:  Negative for cough, shortness of breath and wheezing.    Cardiovascular:  Negative for chest pain.   Genitourinary:  Negative for decreased urine volume, dysuria and hematuria.   Musculoskeletal:  Negative for arthralgias, joint swelling and myalgias.   Skin:  Negative for pallor and rash.   Neurological:  Negative for dizziness, weakness, numbness and headaches.   Psychiatric/Behavioral:  Negative for confusion.        PHYSICAL EXAM  VS: /74 (BP Location: Left arm, Patient Position: Lying)   Pulse 58   Temp 36.3 °C (97.3 °F) (Temporal)   Resp 16   Ht 1.626 m (5' 4\")   Wt 69.9 kg (154 lb)   SpO2 97%   BMI 26.43 kg/m²  Body mass index is 26.43 kg/m².  Physical Exam  Constitutional:       General: She is not in acute distress.     Appearance: Normal appearance.   HENT:      Head: Normocephalic and atraumatic.      Mouth/Throat:      Mouth: Mucous membranes are moist.      Pharynx: Oropharynx is clear.   Eyes:      General: No scleral icterus.     Extraocular Movements: Extraocular movements intact.      Conjunctiva/sclera: Conjunctivae normal.      Pupils: Pupils are equal, round, and " reactive to light.   Cardiovascular:      Rate and Rhythm: Normal rate and regular rhythm.      Heart sounds: No murmur heard.  Pulmonary:      Effort: Pulmonary effort is normal.      Breath sounds: No wheezing or rhonchi.   Abdominal:      General: Bowel sounds are normal. There is no distension.      Palpations: Abdomen is soft. There is no mass.      Tenderness: There is no abdominal tenderness.      Hernia: No hernia is present.   Genitourinary:     Comments: Rectal exam with no external lesions, no internal masses palpated, no stool on exam  Musculoskeletal:         General: No swelling or deformity.   Skin:     General: Skin is warm.      Coloration: Skin is not jaundiced.   Neurological:      General: No focal deficit present.      Mental Status: She is alert and oriented to person, place, and time.   Psychiatric:         Mood and Affect: Mood normal.          DATA  Recent blood work and relevant radiology and endoscopic studies were reviewed and discussed with the patient   Results from last 7 days   Lab Units 09/29/24  0538   WBC AUTO x10*3/uL 7.4   RBC AUTO x10*6/uL 4.25   HEMOGLOBIN g/dL 11.9*   HEMATOCRIT % 38.3   MCV fL 90   MCHC g/dL 31.1*   RDW % 14.6*   PLATELETS AUTO x10*3/uL 174       Results from last 72 hours   Lab Units 09/29/24  0538 09/28/24  1237   SODIUM mmol/L 140 138   POTASSIUM mmol/L 4.5 4.3   CHLORIDE mmol/L 110* 108*   CO2 mmol/L 24 23   BUN mg/dL 11 15   CREATININE mg/dL 0.87 0.74   CALCIUM mg/dL 9.4 9.3   PROTEIN TOTAL g/dL  --  6.7   BILIRUBIN TOTAL mg/dL  --  0.6   ALK PHOS U/L  --  52   AST U/L  --  8*   ALT U/L  --  6*       Results from last 72 hours   Lab Units 09/28/24  1237   INR  1.1       Results from last 72 hours   Lab Units 09/28/24  1237   LIPASE U/L 40      Latest Reference Range & Units 09/28/24 12:37   FERRITIN 8 - 150 ng/mL 18   IRON 35 - 150 ug/dL 73   TIBC 240 - 445 ug/dL 390   UIBC 110 - 370 ug/dL 317   % Saturation 25 - 45 % 19 (L)   (L): Data is abnormally  low  RADIOLOGY REVIEW  CT abdomen pelvis without contrast 9/28/2024  IMPRESSION:  1.  There is no definite bowel wall thickening or dilatation to suggest mechanical obstruction.  2.  Colonic diverticulosis.  3.  There is a scant amount of free pelvic fluid.  No  pneumoperitoneum. Other findings as stated above..    ENDOSCOPIC REVIEW  Enteroscopy by Dr Gaona 6/9/2021  - A single non-bleeding angiodysplastic lesion in the duodenum. Treated with argon plasma coagulation (APC).  - Normal examined duodenum.  - Gastritis.  - Normal esophagus.  - No specimens collected    Capsule endoscopy by Dr. Andrade 5/18/2021  - A single angioectasia without bleeding in the small bowel.  - Small bowel erosion visualized.    Colonoscopy by Dr Gaona 1/6/2021  - Two 3 mm polyps in the descending colon and in the transverse colon, removed with a cold biopsy forceps. Resected and retrieved.  - Diverticulosis in the sigmoid colon.  - Internal hemorrhoids.  - The examination was otherwise normal.    EGD by Dr Díaz 1/6/2021  - Normal esophagus.  - Erosive gastropathy with no stigmata of recent bleeding.  - A single gastric polyp. Biopsied.  - Normal examined duodenum. Biopsied.  - Biopsies were taken with a cold forceps for Helicobacter pylori testing.    IMPRESSION/RECOMMENDATIONS  The patient is a 63 y.o. female with signficant past medical history of iron deficiency anemia, hyperlipidemia, hypertension, CAD, and urothelial cancer who presents for melena/hematochezia.    Melena/hematochezia - hgb (9/29) 11.9, baseline 12-13, normal BUN/Cr ratio, history of duodenal AVM  Chronic antiplatelet Plavix, last dose 9/28 AM    PLAN  Recommend outpatient EGD and colonoscopy, will try to get her scheduled for next 1 to 2 weeks, orders placed  Will need to hold Plavix prior to the procedure  Advance to clear liquid diet, okay to further advance  Continue supportive care per primary team    Discussed Dr. Crawley, GI to sign off, please call questions  or concerns    (Electronically signed byLaureen Browning PA-C on 9/29/2024 at 7:34 AM)    Inpatient consult to gastroenterology  Consult performed by: Laureen Browning PA-C  Consult ordered by: Glenn Aggarwal MD

## 2024-09-29 NOTE — DISCHARGE INSTRUCTIONS
Continue holding Plavix at this time, your GI team will let you know when you can resume after the procedure.

## 2024-09-29 NOTE — DISCHARGE SUMMARY
[facility] Discharge Summary  DISCHARGE DIAGNOSIS     Acute on chronic anemia  Rule out upper GI bleed  History of iron deficiency anemia  Chronic back pain  Chronic opioid dependency  Urothelial cancer of the bladder    HOSPITAL COURSE AND DETAILS     This is a 63-year-old female with a significant past medical history of urothelial cancer of the bladder, iron deficiency anemia, CAD, hypertension, hyperlipidemia that presented from home with chief complaints of black tarry stools x 1 day as well as 2 days of nausea.    Was seen by GI for concerns for GI bleed.  Hemoglobin did not drop.  Iron studies reviewed.  Patient seen by Talha LEWIS for outpatient EGD and colonoscopy.  Sent home with Protonix 40 mg daily.  Advised patient to hold clopidogrel until cleared by her GI team.  Patient also advised to follow-up with PCP once workup is completed.    Total time spent on discharge: >30min      ---Of note, this documentation is completed using the Dragon Dictation system (voice recognition software). There may be spelling and/or grammatical errors that were not corrected prior to final submission.---    Glenn Aggarwal MD    DISCHARGE PHYSICAL EXAM     Last Recorded Vitals:  Vitals:    09/28/24 2040 09/29/24 0024 09/29/24 0508 09/29/24 0759   BP: 129/59 135/62 168/74 (!) 184/77   BP Location: Right arm Left arm Left arm Left arm   Patient Position: Lying Lying Lying Lying   Pulse: (!) 49 58 58 54   Resp: 18 18 16 17   Temp: 36 °C (96.8 °F) 36.7 °C (98.1 °F) 36.3 °C (97.3 °F) 36.5 °C (97.7 °F)   TempSrc: Temporal Temporal Temporal Temporal   SpO2: 97% 98% 97% 97%   Weight:       Height:         Physical Exam  Vitals reviewed.   Constitutional:       General: She is not in acute distress.     Appearance: Normal appearance. She is not toxic-appearing.   HENT:      Head: Normocephalic and atraumatic.   Eyes:      Extraocular Movements: Extraocular movements intact.      Conjunctiva/sclera: Conjunctivae normal.    Cardiovascular:      Rate and Rhythm: Normal rate and regular rhythm.      Pulses: Normal pulses.      Heart sounds: Normal heart sounds.   Pulmonary:      Effort: Pulmonary effort is normal. No respiratory distress.      Breath sounds: Normal breath sounds. No wheezing.   Abdominal:      General: Bowel sounds are normal. There is no distension.      Palpations: Abdomen is soft.      Tenderness: There is no abdominal tenderness. There is no guarding.   Musculoskeletal:         General: Normal range of motion.      Cervical back: Normal range of motion and neck supple.   Neurological:      General: No focal deficit present.      Mental Status: She is alert and oriented to person, place, and time. Mental status is at baseline.   Psychiatric:         Mood and Affect: Mood normal.         Behavior: Behavior normal.         Thought Content: Thought content normal.         DISCHARGE MEDICATIONS        Your medication list        START taking these medications        Instructions Last Dose Given Next Dose Due   pantoprazole 40 mg EC tablet  Commonly known as: ProtoNix      Take 1 tablet (40 mg) by mouth once daily in the morning. Take before meals. Do not crush, chew, or split.              CONTINUE taking these medications        Instructions Last Dose Given Next Dose Due   acarbose 25 mg tablet  Commonly known as: Precose           amitriptyline 25 mg tablet  Commonly known as: Elavil           atorvastatin 80 mg tablet  Commonly known as: Lipitor      Take 1 tablet (80 mg) by mouth once daily at bedtime.       blood sugar diagnostic strip           cholecalciferol (vitamin D3) 25 mcg (1,000 unit) tablet,chewable           clonazePAM 0.5 mg tablet  Commonly known as: KlonoPIN           clopidogrel 75 mg tablet  Commonly known as: Plavix      Take 1 tablet (75 mg) by mouth once daily.       furosemide 40 mg tablet  Commonly known as: Lasix           losartan 50 mg tablet  Commonly known as: Cozaar      Take 1 tablet (50  mg) by mouth 2 times a day.       magnesium oxide 400 mg (241.3 mg magnesium) tablet  Commonly known as: Mag-Ox           metoprolol tartrate 50 mg tablet  Commonly known as: Lopressor      Take 1 tablet by mouth 2 times a day.       nitroglycerin 0.4 mg SL tablet  Commonly known as: Nitrostat           oxybutynin XL 10 mg 24 hr tablet  Commonly known as: Ditropan-XL      Take 1 tablet (10 mg) by mouth once daily. Do not crush, chew, or split.       oxyCODONE-acetaminophen 5-325 mg tablet  Commonly known as: Percocet      Take 1 tablet by mouth every 4 hours if needed for moderate pain (4 - 6).       potassium chloride CR 20 mEq ER tablet  Commonly known as: Klor-Con M20           spironolactone 50 mg tablet  Commonly known as: Aldactone      Take 1 tablet (50 mg) by mouth once daily.       tiZANidine 4 mg tablet  Commonly known as: Zanaflex                     Where to Get Your Medications        These medications were sent to [id number] - [place], [place] - [address]  [address] [facility] [address]      Phone: [phone]   pantoprazole 40 mg EC tablet           OUTPATIENT FOLLOW-UP     Future Appointments   Date Time Provider Department Center   11/8/2024 11:45 AM Jj Bernard MD [id number] [place]   3/27/2025  9:10 AM Lee Pitt MD [id number] [place]

## 2024-10-02 ENCOUNTER — TELEPHONE (OUTPATIENT)
Dept: PRIMARY CARE CLINIC | Age: 64
End: 2024-10-02

## 2024-10-02 ENCOUNTER — TELEPHONE (OUTPATIENT)
Dept: GASTROENTEROLOGY | Facility: CLINIC | Age: 64
End: 2024-10-02
Payer: MEDICARE

## 2024-10-03 DIAGNOSIS — Z12.11 COLON CANCER SCREENING: ICD-10-CM

## 2024-10-03 RX ORDER — SODIUM, POTASSIUM,MAG SULFATES 17.5-3.13G
SOLUTION, RECONSTITUTED, ORAL ORAL
Qty: 1 EACH | Refills: 0 | Status: CANCELLED | OUTPATIENT
Start: 2024-10-03

## 2024-10-04 ENCOUNTER — ANESTHESIA EVENT (OUTPATIENT)
Dept: GASTROENTEROLOGY | Facility: EXTERNAL LOCATION | Age: 64
End: 2024-10-04

## 2024-10-04 RX ORDER — POLYETHYLENE GLYCOL 3350, SODIUM SULFATE ANHYDROUS, SODIUM BICARBONATE, SODIUM CHLORIDE, POTASSIUM CHLORIDE 236; 22.74; 6.74; 5.86; 2.97 G/4L; G/4L; G/4L; G/4L; G/4L
4000 POWDER, FOR SOLUTION ORAL ONCE
Qty: 4000 ML | Refills: 0 | Status: SHIPPED | OUTPATIENT
Start: 2024-10-04 | End: 2024-10-04

## 2024-10-08 ENCOUNTER — ANESTHESIA (OUTPATIENT)
Dept: GASTROENTEROLOGY | Facility: EXTERNAL LOCATION | Age: 64
End: 2024-10-08

## 2024-10-08 ENCOUNTER — APPOINTMENT (OUTPATIENT)
Dept: GASTROENTEROLOGY | Facility: EXTERNAL LOCATION | Age: 64
End: 2024-10-08
Payer: MEDICARE

## 2024-10-08 VITALS
RESPIRATION RATE: 14 BRPM | TEMPERATURE: 96.8 F | SYSTOLIC BLOOD PRESSURE: 120 MMHG | OXYGEN SATURATION: 100 % | HEART RATE: 77 BPM | BODY MASS INDEX: 30.43 KG/M2 | WEIGHT: 155 LBS | HEIGHT: 60 IN | DIASTOLIC BLOOD PRESSURE: 54 MMHG

## 2024-10-08 DIAGNOSIS — K92.1 MELENA: ICD-10-CM

## 2024-10-08 DIAGNOSIS — K92.1 HEMATOCHEZIA: ICD-10-CM

## 2024-10-08 PROCEDURE — 45385 COLONOSCOPY W/LESION REMOVAL: CPT | Performed by: STUDENT IN AN ORGANIZED HEALTH CARE EDUCATION/TRAINING PROGRAM

## 2024-10-08 PROCEDURE — 43239 EGD BIOPSY SINGLE/MULTIPLE: CPT | Performed by: STUDENT IN AN ORGANIZED HEALTH CARE EDUCATION/TRAINING PROGRAM

## 2024-10-08 RX ORDER — NORETHINDRONE AND ETHINYL ESTRADIOL 0.5-0.035
KIT ORAL AS NEEDED
Status: DISCONTINUED | OUTPATIENT
Start: 2024-10-08 | End: 2024-10-08

## 2024-10-08 RX ORDER — PROPOFOL 10 MG/ML
INJECTION, EMULSION INTRAVENOUS AS NEEDED
Status: DISCONTINUED | OUTPATIENT
Start: 2024-10-08 | End: 2024-10-08

## 2024-10-08 RX ORDER — ONDANSETRON HYDROCHLORIDE 2 MG/ML
INJECTION, SOLUTION INTRAVENOUS AS NEEDED
Status: COMPLETED | OUTPATIENT
Start: 2024-10-08 | End: 2024-10-08

## 2024-10-08 RX ORDER — ONDANSETRON HYDROCHLORIDE 2 MG/ML
4 INJECTION, SOLUTION INTRAVENOUS ONCE AS NEEDED
Status: DISCONTINUED | OUTPATIENT
Start: 2024-10-08 | End: 2024-10-09 | Stop reason: HOSPADM

## 2024-10-08 RX ORDER — LIDOCAINE HYDROCHLORIDE 20 MG/ML
INJECTION, SOLUTION INFILTRATION; PERINEURAL AS NEEDED
Status: DISCONTINUED | OUTPATIENT
Start: 2024-10-08 | End: 2024-10-08

## 2024-10-08 RX ORDER — SODIUM CHLORIDE, SODIUM LACTATE, POTASSIUM CHLORIDE, CALCIUM CHLORIDE 600; 310; 30; 20 MG/100ML; MG/100ML; MG/100ML; MG/100ML
20 INJECTION, SOLUTION INTRAVENOUS CONTINUOUS
Status: DISCONTINUED | OUTPATIENT
Start: 2024-10-08 | End: 2024-10-09 | Stop reason: HOSPADM

## 2024-10-08 RX ORDER — SODIUM CHLORIDE 9 MG/ML
INJECTION, SOLUTION INTRAVENOUS CONTINUOUS PRN
Status: DISCONTINUED | OUTPATIENT
Start: 2024-10-08 | End: 2024-10-08

## 2024-10-08 SDOH — HEALTH STABILITY: MENTAL HEALTH: CURRENT SMOKER: 0

## 2024-10-08 ASSESSMENT — PAIN - FUNCTIONAL ASSESSMENT
PAIN_FUNCTIONAL_ASSESSMENT: 0-10

## 2024-10-08 ASSESSMENT — PAIN SCALES - GENERAL
PAINLEVEL_OUTOF10: 0 - NO PAIN
PAIN_LEVEL: 0
PAINLEVEL_OUTOF10: 0 - NO PAIN

## 2024-10-08 NOTE — ANESTHESIA POSTPROCEDURE EVALUATION
Patient: Cuca Laguerre    Procedure Summary       Date: 10/08/24 Room / Location: [facility]    Anesthesia Start: 1350 Anesthesia Stop:     Procedures:       COLONOSCOPY      EGD Diagnosis:       Hematochezia      Melena    Scheduled Providers: Azalea Shepard MD Responsible Provider: LARRY Potts    Anesthesia Type: MAC ASA Status: 3            Anesthesia Type: MAC    Vitals Value Taken Time   BP  10/08/24 1421   Temp  10/08/24 1421   Pulse  10/08/24 1421   Resp  10/08/24 1421   SpO2  10/08/24 1421     BP (!) 120/47   Pulse 54   Temp 36.4 °C (97.5 °F)   Resp 16   Ht 1.524 m (5')   Wt 70.3 kg (155 lb)   SpO2 100%   BMI 30.27 kg/m²     Anesthesia Post Evaluation    Patient location during evaluation: bedside  Patient participation: complete - patient participated  Level of consciousness: sleepy but conscious  Pain score: 0  Pain management: adequate  Airway patency: patent  Cardiovascular status: acceptable  Respiratory status: acceptable  Hydration status: acceptable  Postoperative Nausea and Vomiting: none        No notable events documented.

## 2024-10-08 NOTE — DISCHARGE INSTRUCTIONS
Patient Instructions Post Procedure      The anesthetics, sedatives or narcotics which were given to you today will be acting in your body for the next 24 hours, so you might feel a little sleepy or groggy.  This feeling should slowly wear off. Carefully read and follow the instructions.     You received sedation today:  - Do not drive or operate any machinery or power tools of any kind.   - No alcoholic beverages today, not even beer or wine.  - Do not make any important decisions or sign any legal documents.  - No over the counter medications that contain alcohol or that may cause drowsiness.    While it is common to experience mild to moderate abdominal distention, gas, or belching after your procedure, if any of these symptoms occur following discharge from the GI Lab or within one week of having your procedure, call the [facility] to be advised whether a visit to your nearest Urgent Care or Emergency Department is indicated.  Take this paper with you if you go.   - If you develop an allergic reaction to the medications that were given during your procedure such as difficulty breathing, rash, hives, severe nausea, vomiting or lightheadedness.  - If you experience chest pain, shortness of breath, severe abdominal pain, fevers and chills.  -If you develop signs and symptoms of bleeding such as blood in your spit, if your stools turn black, tarry, or bloody  - If you have not urinated within 8 hours following your procedure.  - If your IV site becomes painful, red, inflamed, or looks infected.    Your physician recommends the additional following instructions:    -You have a contact number available for emergencies. The signs and symptoms of potential delayed complications were discussed with you. You may return to normal activities tomorrow.  -Resume your previous diet or other if specified.  -Continue your present medications.   -We are waiting for your pathology results, if applicable.  -The  "findings and recommendations have been discussed with you and/or family.  - Please see Medication Reconciliation Form for new medication/medications prescribed.     If you experience any problems or have any questions following discharge from the GI Lab, please call: [phone] from 7 am- 4:30 pm.  In the event of an emergency please go to the closest Emergency Department or call Dr. Shepard  : [phone]    You will receive a follow up text message tomorrow morning, if you are in need of a call back to address a non urgent question please respond with \"YES\", and we will call you the following business day Monday through Friday. If you do not need to a call back please respond with the word \"NO\" and we will remove you from the call back list.   "

## 2024-10-08 NOTE — ANESTHESIA PREPROCEDURE EVALUATION
Patient: Cuca Laguerre    Procedure Information       Date/Time: 10/08/24 1340    Scheduled providers: Azalea Shepard MD    Procedures:       COLONOSCOPY      EGD    Location: [facility]            Relevant Problems   Anesthesia (within normal limits)      Cardiac   (+) Chest pain   (+) Essential hypertension, benign   (+) Hypercholesterolemia   (+) Mixed hyperlipidemia      Pulmonary (within normal limits)      Neuro   (+) Anxiety   (+) Cubital tunnel syndrome   (+) Depressive disorder   (+) Major depression   (+) Ulnar neuropathy      GI   (+) GI bleed      /Renal (within normal limits)      Liver (within normal limits)      Endocrine   (+) Class 1 obesity with body mass index (BMI) of 32.0 to 32.9 in adult   (+) Obese   (+) Severe obesity (BMI 35.0-39.9) with comorbidity (Multi)   (+) Type 2 diabetes mellitus without complication, without long-term current use of insulin (Multi)      Hematology   (+) Anemia   (+) Blood loss anemia   (+) Iron deficiency anemia   (+) Iron deficiency anemia due to dietary causes   (+) Other acquired hemolytic anemias      Musculoskeletal   (+) DDD (degenerative disc disease), lumbar   (+) Fibromyalgia      HEENT (within normal limits)      ID (within normal limits)      Skin (within normal limits)      GYN (within normal limits)       Clinical information reviewed:   Tobacco  Allergies  Meds   Med Hx  Surg Hx   Fam Hx  Soc Hx        NPO Detail:  NPO/Void Status  Date of Last Liquid: 10/08/24  Time of Last Liquid: 0600  Date of Last Solid: 10/06/24         Physical Exam    Airway  Mallampati: II  TM distance: >3 FB  Neck ROM: full     Cardiovascular   Rhythm: regular  Rate: normal     Dental    Pulmonary   Breath sounds clear to auscultation     Abdominal   Abdomen: soft  Bowel sounds: normal           Anesthesia Plan    History of general anesthesia?: yes  History of complications of general anesthesia?: no    ASA 3     MAC     The patient is not a current  smoker.  Patient was not previously instructed to abstain from smoking on day of procedure.  Education provided regarding risk of obstructive sleep apnea.  intravenous induction   Anesthetic plan and risks discussed with patient.    Plan discussed with CRNA.

## 2024-10-15 LAB
LABORATORY COMMENT REPORT: NORMAL
PATH REPORT.FINAL DX SPEC: NORMAL
PATH REPORT.GROSS SPEC: NORMAL
PATH REPORT.TOTAL CANCER: NORMAL

## 2024-11-08 ENCOUNTER — APPOINTMENT (OUTPATIENT)
Dept: CARDIOLOGY | Facility: CLINIC | Age: 64
End: 2024-11-08
Payer: MEDICARE

## 2024-11-22 ENCOUNTER — APPOINTMENT (OUTPATIENT)
Dept: GASTROENTEROLOGY | Facility: EXTERNAL LOCATION | Age: 64
End: 2024-11-22
Payer: MEDICARE

## 2024-11-29 DIAGNOSIS — E78.2 MIXED HYPERLIPIDEMIA: ICD-10-CM

## 2024-12-02 RX ORDER — ATORVASTATIN CALCIUM 80 MG/1
80 TABLET, FILM COATED ORAL NIGHTLY
Qty: 90 TABLET | Refills: 3 | Status: SHIPPED | OUTPATIENT
Start: 2024-12-02

## 2025-01-03 ENCOUNTER — APPOINTMENT (OUTPATIENT)
Dept: CARDIOLOGY | Facility: CLINIC | Age: 65
End: 2025-01-03
Payer: MEDICARE

## 2025-02-27 DIAGNOSIS — I10 ESSENTIAL HYPERTENSION, BENIGN: ICD-10-CM

## 2025-02-27 RX ORDER — SPIRONOLACTONE 50 MG/1
50 TABLET, FILM COATED ORAL DAILY
Qty: 30 TABLET | Refills: 0 | Status: SHIPPED | OUTPATIENT
Start: 2025-02-27 | End: 2025-03-29

## 2025-02-27 NOTE — TELEPHONE ENCOUNTER
Received request for prescription refills for patient.   Patient follows with Dr. Bernard    Request is for Aldactone  Is patient currently on medication yes    Last OV 11/3/2023  Next OV -none pending and pt no SHOWED past 2 appointments. Sent to secretaries to make follow up

## 2025-03-13 ENCOUNTER — APPOINTMENT (OUTPATIENT)
Dept: CARDIOLOGY | Facility: CLINIC | Age: 65
End: 2025-03-13
Payer: MEDICARE

## 2025-03-13 VITALS
DIASTOLIC BLOOD PRESSURE: 60 MMHG | SYSTOLIC BLOOD PRESSURE: 124 MMHG | BODY MASS INDEX: 30.63 KG/M2 | HEART RATE: 80 BPM | WEIGHT: 156 LBS | HEIGHT: 60 IN

## 2025-03-13 DIAGNOSIS — I25.2 H/O NON-ST ELEVATION MYOCARDIAL INFARCTION (NSTEMI): ICD-10-CM

## 2025-03-13 DIAGNOSIS — E11.9 TYPE 2 DIABETES MELLITUS WITHOUT COMPLICATION, WITHOUT LONG-TERM CURRENT USE OF INSULIN (MULTI): ICD-10-CM

## 2025-03-13 DIAGNOSIS — E78.00 HYPERCHOLESTEROLEMIA: ICD-10-CM

## 2025-03-13 DIAGNOSIS — Z87.891 FORMER CIGARETTE SMOKER: ICD-10-CM

## 2025-03-13 DIAGNOSIS — I10 ESSENTIAL HYPERTENSION, BENIGN: ICD-10-CM

## 2025-03-13 DIAGNOSIS — I25.10 CAD S/P PERCUTANEOUS CORONARY ANGIOPLASTY: ICD-10-CM

## 2025-03-13 DIAGNOSIS — E78.2 MIXED HYPERLIPIDEMIA: ICD-10-CM

## 2025-03-13 DIAGNOSIS — Z98.61 CAD S/P PERCUTANEOUS CORONARY ANGIOPLASTY: ICD-10-CM

## 2025-03-13 PROCEDURE — 3078F DIAST BP <80 MM HG: CPT | Performed by: INTERNAL MEDICINE

## 2025-03-13 PROCEDURE — 4010F ACE/ARB THERAPY RXD/TAKEN: CPT | Performed by: INTERNAL MEDICINE

## 2025-03-13 PROCEDURE — 3008F BODY MASS INDEX DOCD: CPT | Performed by: INTERNAL MEDICINE

## 2025-03-13 PROCEDURE — 99212 OFFICE O/P EST SF 10 MIN: CPT | Performed by: INTERNAL MEDICINE

## 2025-03-13 PROCEDURE — 3074F SYST BP LT 130 MM HG: CPT | Performed by: INTERNAL MEDICINE

## 2025-03-13 PROCEDURE — 1036F TOBACCO NON-USER: CPT | Performed by: INTERNAL MEDICINE

## 2025-03-13 RX ORDER — SPIRONOLACTONE 50 MG/1
50 TABLET, FILM COATED ORAL DAILY
Qty: 90 TABLET | Refills: 3 | Status: SHIPPED | OUTPATIENT
Start: 2025-03-13 | End: 2026-03-13

## 2025-03-13 RX ORDER — LOSARTAN POTASSIUM 50 MG/1
50 TABLET ORAL 2 TIMES DAILY
Qty: 180 TABLET | Refills: 3 | Status: SHIPPED | OUTPATIENT
Start: 2025-03-13 | End: 2026-03-13

## 2025-03-13 ASSESSMENT — ENCOUNTER SYMPTOMS
CARDIOVASCULAR NEGATIVE: 1
NEUROLOGICAL NEGATIVE: 1
CONSTITUTIONAL NEGATIVE: 1
RESPIRATORY NEGATIVE: 1

## 2025-03-13 NOTE — PROGRESS NOTES
CARDIOLOGY OFFICE VISIT      CHIEF COMPLAINT  Chief Complaint   Patient presents with    Follow-up     Patient here for over due follow-up for CAD.  LOV 11/3/2023        HISTORY OF PRESENT ILLNESS  The patient states that she has been doing well from a cardiac standpoint.  She states that on very rare occasion she will have very mild brief episode of throat tightness.  She denies dyspnea on exertion.  She denies palpitations and syncope.  She is still struggling with her iron deficiency anemia and does have to have iron infusions.  She denies Amprol with her current medications.  She has not had lipid profile done for a while.  I told her we will get lipid profile done today and call her with results.      IMPRESSION:   Coronary artery disease, no angina.   Non ST-segment elevation myocardial infarction, 2018.  Percutaneous coronary intervention with drug-eluting stent, proximal left anterior descending coronary, proximal right coronary artery, 2018.  Mixed hyperlipidemia  Hypertension  Anemia being followed by Dr. Khan, she obtains iron infusions once in a while for this.  Obesity     Please excuse any errors in grammar or translation related to this dictation. Voice recognition software was utilized to prepare this document.      Past Medical History  Past Medical History:   Diagnosis Date    Anxiety     Bladder cancer (Multi)     Chronic pain disorder     Coronary artery disease     Diabetes mellitus (Multi)     Fibromyalgia, primary     Heart disease     Hyperlipidemia     Hypertension     Lumbar disc disease     Myocardial infarction (Multi)        Social History  Social History     Tobacco Use    Smoking status: Former     Current packs/day: 0.00     Average packs/day: 0.5 packs/day for 10.0 years (5.0 ttl pk-yrs)     Types: Cigarettes     Start date: 2009     Quit date: 2019     Years since quittin.3    Smokeless tobacco: Never   Vaping Use    Vaping status: Never Used    Substance Use Topics    Alcohol use: Yes     Comment: socially    Drug use: Never       Family History     Family History   Problem Relation Name Age of Onset    Dementia Mother      Other (cardiac disorder) Father      Diabetes Father      Diabetes Brother          Allergies:  Allergies   Allergen Reactions    Iodides Anaphylaxis     Contrast DYE ONLY    Other reaction(s): Hives, Respiratory distress   IVP dye    Iodinated Contrast Media Hives, Rash and Shortness of breath     IVP dye    Erythromycin Rash    Gabapentin Other     No focus    Moxifloxacin Unknown    Pregabalin Other     confusion    Metformin Hives, Itching and Rash    Sulfa (Sulfonamide Antibiotics) Hives and Rash     Other reaction(s): Hives        Outpatient Medications:  Current Outpatient Medications   Medication Instructions    acarbose (Precose) 25 mg tablet Take 1 tablet by mouth 3 times daily (with meals)    amitriptyline (Elavil) 25 mg tablet 1 tablet, Nightly    atorvastatin (LIPITOR) 80 mg, oral, Nightly    blood sugar diagnostic strip 1 each by In Vitro route daily As needed.    cholecalciferol, vitamin D3, 25 mcg (1,000 unit) tablet,chewable 1 tablet, Daily    clonazePAM (KlonoPIN) 0.5 mg tablet TAKE ONE TABLET BY MOUTH EVERY DAY AT BEDTIME AS TOLERATED    clopidogrel (PLAVIX) 75 mg, oral, Daily    furosemide (Lasix) 40 mg tablet 2 tablets, Daily    losartan (COZAAR) 50 mg, oral, 2 times daily    magnesium oxide (Mag-Ox) 400 mg (241.3 mg magnesium) tablet Take 1 tablet by mouth daily    metoprolol tartrate (LOPRESSOR) 50 mg, oral, 2 times daily    nitroglycerin (Nitrostat) 0.4 mg SL tablet PLACE 1 TABLET UNDER THE TONGUE EVERY 5 MINUTES FOR UP TO 3 DOSES AS NEEDED FOR CHEST PAIN    oxybutynin XL (DITROPAN-XL) 10 mg, oral, Daily, Do not crush, chew, or split.    oxyCODONE-acetaminophen (Percocet) 5-325 mg tablet 1 tablet, oral, Every 4 hours PRN    pantoprazole (ProtoNix) 40 mg EC tablet TAKE ONE TABLET BY MOUTH EVERY DAY IN THE  MORNING BEFORE A MEAL. DO NOT CRUSH, CHEW OR SPLIT TABLET    potassium chloride CR 20 mEq ER tablet TAKE ONE TABLET BY MOUTH DAILY AS NEEDED WHEN TAKING FUROSEMIDE    spironolactone (ALDACTONE) 50 mg, oral, Daily    tiZANidine (Zanaflex) 4 mg tablet Take 1 tablet (4 mg) by mouth every 8 hours if needed for muscle spasms.          REVIEW OF SYSTEMS  Review of Systems   Constitutional: Negative.   Cardiovascular: Negative.    Respiratory: Negative.     Neurological: Negative.    All other systems reviewed and are negative.        VITALS  Vitals:    03/13/25 1326   BP: 124/60   Pulse: 80       PHYSICAL EXAM  Constitutional:       Appearance: Healthy appearance. Not in distress.   Eyes:      Conjunctiva/sclera: Conjunctivae normal.      Pupils: Pupils are equal, round, and reactive to light.   Neck:      Vascular: No JVR. JVD normal.   Pulmonary:      Effort: Pulmonary effort is normal.      Breath sounds: Normal breath sounds. No wheezing. No rhonchi. No rales.   Chest:      Chest wall: Not tender to palpatation.   Cardiovascular:      PMI at left midclavicular line. Normal rate. Regular rhythm. Normal S1. Normal S2.       Murmurs: There is no murmur.      No gallop.  No click. No rub.   Pulses:     Intact distal pulses.   Edema:     Peripheral edema absent.   Abdominal:      Tenderness: There is no abdominal tenderness.   Musculoskeletal: Normal range of motion.         General: No tenderness.      Cervical back: Normal range of motion. Skin:     General: Skin is warm and dry.   Neurological:      General: No focal deficit present.      Mental Status: Alert and oriented to person, place and time.           ASSESSMENT AND PLAN  Problem List Items Addressed This Visit       CAD S/P percutaneous coronary angioplasty    Essential hypertension, benign    Hypercholesterolemia    Mixed hyperlipidemia    Type 2 diabetes mellitus without complication, without long-term current use of insulin (Multi)    H/O non-ST elevation  myocardial infarction (NSTEMI)    BMI 30.0-30.9,adult    Former cigarette smoker       [unfilled]      I,Abimael Amezcua LPN   am scribing for, and in the presence of Dr. Jj Prakash MD   .    I, Dr. Jj Prakash MD   , personally performed the services described in the documentation as scribed by Abimael Amezcua LPN   in my presence, and confirm it is both accurate and complete.      Dr. Jj Prakash MD  Thank you for allowing me to participate in the care of this patient. Please do not hesitate to contact me with any further questions or concerns.

## 2025-03-13 NOTE — PATIENT INSTRUCTIONS
Lipid today  Continue same medications and treatments.   Patient educated on proper medication use.   Patient educated on risk factor modification.   Please bring any lab results from other providers / physicians to your next appointment.     Please bring all medicines, vitamins, and herbal supplements with you when you come to the office.     Prescriptions will not be filled unless you are compliant with your follow up appointments or have a follow up appointment scheduled as per instruction of your physician. Refills should be requested at the time of your visit.  1  year visit

## 2025-03-14 LAB
CHOLEST SERPL-MCNC: 209 MG/DL
CHOLEST/HDLC SERPL: 3.3 (CALC)
HDLC SERPL-MCNC: 64 MG/DL
LDLC SERPL CALC-MCNC: 114 MG/DL (CALC)
NONHDLC SERPL-MCNC: 145 MG/DL (CALC)
TRIGL SERPL-MCNC: 191 MG/DL

## 2025-03-17 DIAGNOSIS — E78.2 MIXED HYPERLIPIDEMIA: ICD-10-CM

## 2025-03-17 RX ORDER — EZETIMIBE 10 MG/1
10 TABLET ORAL DAILY
Qty: 90 TABLET | Refills: 3 | Status: SHIPPED | OUTPATIENT
Start: 2025-03-17 | End: 2026-03-17

## 2025-03-17 NOTE — TELEPHONE ENCOUNTER
----- Message from Jj Bernard sent at 3/14/2025  8:43 AM EDT -----  LDL cholesterol too high, 114, and generic Zetia 10 mg a day to her current dose of atorvastatin 80 mg a day, recheck lipids in 2 months  ----- Message -----  From: [place] [facility] Results In  Sent: 3/14/2025   5:52 AM EDT  To: Jj Bernard MD

## 2025-03-24 RX ORDER — LIDOCAINE HYDROCHLORIDE 20 MG/ML
1 JELLY TOPICAL ONCE
Status: COMPLETED | OUTPATIENT
Start: 2025-03-27 | End: 2025-03-27

## 2025-03-27 ENCOUNTER — APPOINTMENT (OUTPATIENT)
Dept: UROLOGY | Facility: CLINIC | Age: 65
End: 2025-03-27
Payer: MEDICARE

## 2025-03-27 VITALS — DIASTOLIC BLOOD PRESSURE: 76 MMHG | SYSTOLIC BLOOD PRESSURE: 149 MMHG | HEART RATE: 89 BPM | TEMPERATURE: 97.9 F

## 2025-03-27 DIAGNOSIS — C68.9 UROTHELIAL CARCINOMA: Primary | ICD-10-CM

## 2025-03-27 DIAGNOSIS — C67.6 MALIGNANT NEOPLASM OF URETERIC ORIFICE (MULTI): ICD-10-CM

## 2025-03-27 DIAGNOSIS — R31.9 HEMATURIA, UNSPECIFIED TYPE: ICD-10-CM

## 2025-03-27 LAB
POC APPEARANCE, URINE: CLEAR
POC BILIRUBIN, URINE: NEGATIVE
POC BLOOD, URINE: ABNORMAL
POC COLOR, URINE: YELLOW
POC GLUCOSE, URINE: NEGATIVE MG/DL
POC KETONES, URINE: NEGATIVE MG/DL
POC LEUKOCYTES, URINE: NEGATIVE
POC NITRITE,URINE: NEGATIVE
POC PH, URINE: 6 PH
POC PROTEIN, URINE: ABNORMAL MG/DL
POC SPECIFIC GRAVITY, URINE: 1.02
POC UROBILINOGEN, URINE: 0.2 EU/DL

## 2025-03-27 PROCEDURE — 81003 URINALYSIS AUTO W/O SCOPE: CPT | Performed by: UROLOGY

## 2025-03-27 PROCEDURE — 52000 CYSTOURETHROSCOPY: CPT | Performed by: UROLOGY

## 2025-03-27 RX ORDER — CEFAZOLIN SODIUM 2 G/100ML
2 INJECTION, SOLUTION INTRAVENOUS ONCE
OUTPATIENT
Start: 2025-03-27 | End: 2025-03-27

## 2025-03-27 RX ADMIN — LIDOCAINE HYDROCHLORIDE 1 APPLICATION: 20 JELLY TOPICAL at 09:26

## 2025-03-27 NOTE — H&P (VIEW-ONLY)
Subjective   Patient ID: Cuca Laguerre is a 64 y.o. female who presents for CYSTOSCOPY. Last seen 9/26/24 when The patient tolerated the cystoscopy procedure well. Cystoscopy is SAMEER. I explained that anticholinergic medication must be taken for several weeks to notice a benefit and must continue taking it to maintain benefit. Order oxybutynin XL 10 mg. We will see the patient back in 6 months for surveillance cystoscopy.      HPI  The patient states she has been doing well over the interval.     Review of Systems  A 12 system review was completed and is negative with the exception of those signs and symptoms noted in the history of present illness.    Objective   Physical Exam  General: in NAD, appears stated age  Head: normocephalic, atraumatic  Respiratory: normal effort, no use of accessory muscles  Cardiovascular: no edema noted  Skin: normal turgor, no rashes  Neurologic: grossly intact, oriented to person/place/time  Psychiatric: mode and affect appropriate     Procedure  Cystoscopy for urothelial carcinoma  ?Patient's genitalia were prepped and draped in usual sterile fashion. A 17 Portuguese flexible cystoscope was passed atraumatically per the urethra and the bladder was inspected. No clots, stones, or foreign bodies were identified. The right and left ureteral orifice were in their normal anatomic position and effluxing clear urine. The scope was retroflexed and the bladder neck was unremarkable. The patient tolerated the procedure well.   Findings include at right anterior bladder there was a 3 cm papillary recurrence. The remainder of the bladder was unremarkable.     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 Cystoscopy finds at right anterior bladder there was a 3 cm papillary recurrence. The remainder of the bladder was unremarkable. The patient will need to have TURBT. I will reach out to my colleagues to arrange for TURBT and we will contact the patient with arrangements.     Follow-up with TURBT for continued management of urothelial cancer.     Scribe Attestation  By signing my name below, I, Celena Perez   attest that this documentation has been prepared under the direction and in the presence of Lee Pitt MD.

## 2025-03-27 NOTE — PROGRESS NOTES
Subjective   Patient ID: Cuca Laguerre is a 64 y.o. female who presents for CYSTOSCOPY. Last seen 9/26/24 when The patient tolerated the cystoscopy procedure well. Cystoscopy is SAMEER. I explained that anticholinergic medication must be taken for several weeks to notice a benefit and must continue taking it to maintain benefit. Order oxybutynin XL 10 mg. We will see the patient back in 6 months for surveillance cystoscopy.      HPI  The patient states she has been doing well over the interval.     Review of Systems  A 12 system review was completed and is negative with the exception of those signs and symptoms noted in the history of present illness.    Objective   Physical Exam  General: in NAD, appears stated age  Head: normocephalic, atraumatic  Respiratory: normal effort, no use of accessory muscles  Cardiovascular: no edema noted  Skin: normal turgor, no rashes  Neurologic: grossly intact, oriented to person/place/time  Psychiatric: mode and affect appropriate     Procedure  Cystoscopy for urothelial carcinoma  ?Patient's genitalia were prepped and draped in usual sterile fashion. A 17 Chinese flexible cystoscope was passed atraumatically per the urethra and the bladder was inspected. No clots, stones, or foreign bodies were identified. The right and left ureteral orifice were in their normal anatomic position and effluxing clear urine. The scope was retroflexed and the bladder neck was unremarkable. The patient tolerated the procedure well.   Findings include at right anterior bladder there was a 3 cm papillary recurrence. The remainder of the bladder was unremarkable.     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 Cystoscopy finds at right anterior bladder there was a 3 cm papillary recurrence. The remainder of the bladder was unremarkable. The patient will need to have TURBT. I will reach out to my colleagues to arrange for TURBT and we will contact the patient with arrangements.     Follow-up with TURBT for continued management of urothelial cancer.     Scribe Attestation  By signing my name below, I, Celena Perez   attest that this documentation has been prepared under the direction and in the presence of Lee Pitt MD.

## 2025-03-28 ENCOUNTER — TELEPHONE (OUTPATIENT)
Dept: CARDIOLOGY | Facility: CLINIC | Age: 65
End: 2025-03-28
Payer: MEDICARE

## 2025-03-28 NOTE — TELEPHONE ENCOUNTER
Per Dr. Jj Bernard , patient is an acceptable cardiac risk for upcoming surgery and may hold her Plavix 7 days prior to surgery and resume following.  Call returned to patient and advised.  Patient verbalized understanding.

## 2025-03-28 NOTE — TELEPHONE ENCOUNTER
Patient called and LM stating she is having bladder cancer surgery next Friday and she needs to know when to stop her Plavix. Routed to Ariane GRUBBS LPN

## 2025-03-29 LAB — BACTERIA UR CULT: NORMAL

## 2025-04-02 ENCOUNTER — APPOINTMENT (OUTPATIENT)
Dept: LAB | Facility: HOSPITAL | Age: 65
End: 2025-04-02
Payer: MEDICARE

## 2025-04-02 ENCOUNTER — PRE-ADMISSION TESTING (OUTPATIENT)
Dept: PREADMISSION TESTING | Facility: HOSPITAL | Age: 65
End: 2025-04-02
Payer: MEDICARE

## 2025-04-02 VITALS
SYSTOLIC BLOOD PRESSURE: 119 MMHG | DIASTOLIC BLOOD PRESSURE: 56 MMHG | TEMPERATURE: 97.5 F | BODY MASS INDEX: 30.76 KG/M2 | RESPIRATION RATE: 16 BRPM | HEART RATE: 80 BPM | OXYGEN SATURATION: 100 % | HEIGHT: 61 IN | WEIGHT: 162.92 LBS

## 2025-04-02 DIAGNOSIS — E11.9 TYPE 2 DIABETES MELLITUS WITHOUT COMPLICATION, WITHOUT LONG-TERM CURRENT USE OF INSULIN: ICD-10-CM

## 2025-04-02 DIAGNOSIS — Z01.818 PREOP TESTING: Primary | ICD-10-CM

## 2025-04-02 DIAGNOSIS — C68.9 UROTHELIAL CARCINOMA: ICD-10-CM

## 2025-04-02 LAB
ANION GAP SERPL CALC-SCNC: 12 MMOL/L (ref 10–20)
APPEARANCE UR: CLEAR
BILIRUB UR STRIP.AUTO-MCNC: NEGATIVE MG/DL
BUN SERPL-MCNC: 30 MG/DL (ref 6–23)
CALCIUM SERPL-MCNC: 9.3 MG/DL (ref 8.6–10.3)
CHLORIDE SERPL-SCNC: 104 MMOL/L (ref 98–107)
CO2 SERPL-SCNC: 23 MMOL/L (ref 21–32)
COLOR UR: ABNORMAL
CREAT SERPL-MCNC: 0.95 MG/DL (ref 0.5–1.05)
EGFRCR SERPLBLD CKD-EPI 2021: 67 ML/MIN/1.73M*2
ERYTHROCYTE [DISTWIDTH] IN BLOOD BY AUTOMATED COUNT: 21.2 % (ref 11.5–14.5)
GLUCOSE SERPL-MCNC: 118 MG/DL (ref 74–99)
GLUCOSE UR STRIP.AUTO-MCNC: NORMAL MG/DL
HCT VFR BLD AUTO: 26.2 % (ref 36–46)
HGB BLD-MCNC: 7.3 G/DL (ref 12–16)
HYALINE CASTS #/AREA URNS AUTO: ABNORMAL /LPF
KETONES UR STRIP.AUTO-MCNC: NEGATIVE MG/DL
LEUKOCYTE ESTERASE UR QL STRIP.AUTO: ABNORMAL
MCH RBC QN AUTO: 20.4 PG (ref 26–34)
MCHC RBC AUTO-ENTMCNC: 27.9 G/DL (ref 32–36)
MCV RBC AUTO: 73 FL (ref 80–100)
MUCOUS THREADS #/AREA URNS AUTO: ABNORMAL /LPF
NITRITE UR QL STRIP.AUTO: NEGATIVE
NRBC BLD-RTO: 0 /100 WBCS (ref 0–0)
PH UR STRIP.AUTO: 5.5 [PH]
PLATELET # BLD AUTO: 306 X10*3/UL (ref 150–450)
POTASSIUM SERPL-SCNC: 4.9 MMOL/L (ref 3.5–5.3)
PROT UR STRIP.AUTO-MCNC: ABNORMAL MG/DL
RBC # BLD AUTO: 3.57 X10*6/UL (ref 4–5.2)
RBC # UR STRIP.AUTO: ABNORMAL MG/DL
RBC #/AREA URNS AUTO: ABNORMAL /HPF
SODIUM SERPL-SCNC: 134 MMOL/L (ref 136–145)
SP GR UR STRIP.AUTO: 1.02
UROBILINOGEN UR STRIP.AUTO-MCNC: NORMAL MG/DL
WBC # BLD AUTO: 12.3 X10*3/UL (ref 4.4–11.3)
WBC #/AREA URNS AUTO: ABNORMAL /HPF

## 2025-04-02 PROCEDURE — 93005 ELECTROCARDIOGRAM TRACING: CPT

## 2025-04-02 PROCEDURE — 81001 URINALYSIS AUTO W/SCOPE: CPT

## 2025-04-02 PROCEDURE — 99202 OFFICE O/P NEW SF 15 MIN: CPT | Performed by: NURSE PRACTITIONER

## 2025-04-02 ASSESSMENT — ACTIVITIES OF DAILY LIVING (ADL): ADL_SCORE: 0

## 2025-04-02 ASSESSMENT — DUKE ACTIVITY SCORE INDEX (DASI)
CAN YOU DO LIGHT WORK AROUND THE HOUSE LIKE DUSTING OR WASHING DISHES: NO
CAN YOU DO YARD WORK LIKE RAKING LEAVES, WEEDING OR PUSHING A MOWER: NO
CAN YOU WALK INDOORS, SUCH AS AROUND YOUR HOUSE: YES
CAN YOU DO MODERATE WORK AROUND THE HOUSE LIKE VACUUMING, SWEEPING FLOORS OR CARRYING GROCERIES: NO
CAN YOU RUN A SHORT DISTANCE: NO
CAN YOU TAKE CARE OF YOURSELF (EAT, DRESS, BATHE, OR USE TOILET): YES
CAN YOU HAVE SEXUAL RELATIONS: NO
CAN YOU DO HEAVY WORK AROUND THE HOUSE LIKE SCRUBBING FLOORS OR LIFTING AND MOVING HEAVY FURNITURE: NO
TOTAL_SCORE: 10
DASI METS SCORE: 4
CAN YOU WALK A BLOCK OR TWO ON LEVEL GROUND: NO
CAN YOU PARTICIPATE IN MODERATE RECREATIONAL ACTIVITIES LIKE GOLF, BOWLING, DANCING, DOUBLES TENNIS OR THROWING A BASEBALL OR FOOTBALL: NO
CAN YOU CLIMB A FLIGHT OF STAIRS OR WALK UP A HILL: YES
CAN YOU PARTICIPATE IN STRENOUS SPORTS LIKE SWIMMING, SINGLES TENNIS, FOOTBALL, BASKETBALL, OR SKIING: NO

## 2025-04-02 ASSESSMENT — PAIN - FUNCTIONAL ASSESSMENT: PAIN_FUNCTIONAL_ASSESSMENT: 0-10

## 2025-04-02 ASSESSMENT — LIFESTYLE VARIABLES: SMOKING_STATUS: NONSMOKER

## 2025-04-02 NOTE — CPM/PAT H&P
CPM/PAT Evaluation       Name: Cuca Laguerre (Cuca Laguerre)  /Age: 1960/64 y.o.     In-Person       Chief Complaint: Bladder Cancer    HPI64 year old female for TURBT 25. Patient states she was diagnosed with bladder cancer 2 1/2 years ago and this her second reoccurrence. She initially had immunotherapy after diagnosis and has had TURBT in past. She is having microscopic hematuria but denies visible hematuria. Denies dysuria. Has had an increase in back pain. Does have DDD and herniated disc that she is in pain management for.     Past Medical History:   Diagnosis Date    Anxiety     Bladder cancer (Multi)     Chronic pain disorder     Coronary artery disease     Diabetes mellitus (Multi)     Fibromyalgia, primary     Heart disease     Hyperlipidemia     Hypertension     Lumbar disc disease     Myocardial infarction (Multi)     PONV (postoperative nausea and vomiting)        Past Surgical History:   Procedure Laterality Date    APPENDECTOMY      CT HEAD ANGIO W AND WO IV CONTRAST  2023    CT HEAD ANGIO W AND WO IV CONTRAST 2023 DOCTOR OFFICE [facility]    HYSTERECTOMY  2014    Hysterectomy    MR HEAD ANGIO WO IV CONTRAST  2023    MR HEAD ANGIO WO IV CONTRAST 2023 DOCTOR OFFICE LEGAC[facility]    OTHER SURGICAL HISTORY  09/15/2022    Appendectomy    OTHER SURGICAL HISTORY  2022    Transurethral resection of bladder tumor    OTHER SURGICAL HISTORY  06/15/2022    Surgery    OTHER SURGICAL HISTORY  06/15/2022    Colonoscopy    OTHER SURGICAL HISTORY  06/15/2022    Ulnar nerve transposition    OTHER SURGICAL HISTORY  2022    Shoulder surgery    TONSILLECTOMY  2014    Tonsillectomy       Patient Sexual activity questions deferred to the physician.    Family History   Problem Relation Name Age of Onset    Dementia Mother      Heart disease Mother      Diabetes Mother      Hypertension Mother      Other (cardiac disorder) Father      Diabetes Father      Diabetes Brother          Allergies   Allergen Reactions    Iodides Anaphylaxis     Contrast DYE ONLY    Other reaction(s): Hives, Respiratory distress   IVP dye    Iodinated Contrast Media Hives, Rash and Shortness of breath     IVP dye    Erythromycin Rash    Gabapentin Other     No focus    Moxifloxacin Unknown    Pregabalin Other     confusion    Metformin Hives, Itching and Rash    Sulfa (Sulfonamide Antibiotics) Hives and Rash     Other reaction(s): Hives       Prior to Admission medications    Medication Sig Start Date End Date Taking? Authorizing Provider   acarbose (Precose) 25 mg tablet Take 1 tablet by mouth 3 times daily (with meals) 9/30/21   Historical Provider, MD   amitriptyline (Elavil) 25 mg tablet Take 1 tablet (25 mg) by mouth once daily at bedtime.    Historical Provider, MD   atorvastatin (Lipitor) 80 mg tablet TAKE ONE TABLET BY MOUTH ONCE DAILY AT BEDTIME 12/2/24   Sharifa Bull MD   blood sugar diagnostic strip 1 each by In Vitro route daily As needed. 9/30/21   Historical Provider, MD   cholecalciferol, vitamin D3, 25 mcg (1,000 unit) tablet,chewable Chew 1 tablet (25 mcg) once daily.    Historical Provider, MD   clonazePAM (KlonoPIN) 0.5 mg tablet TAKE ONE TABLET BY MOUTH EVERY DAY AT BEDTIME AS TOLERATED    Historical Provider, MD   clopidogrel (Plavix) 75 mg tablet Take 1 tablet (75 mg) by mouth once daily. 5/17/24 5/17/25  Edmond Crockett,    ezetimibe (Zetia) 10 mg tablet Take 1 tablet (10 mg) by mouth once daily. 3/17/25 3/17/26  Jj Bernard MD   furosemide (Lasix) 40 mg tablet Take 2 tablets (80 mg) by mouth once daily. PRN 4/18/23   Historical Provider, MD   losartan (Cozaar) 50 mg tablet Take 1 tablet (50 mg) by mouth 2 times a day. 3/13/25 3/13/26  Jj Bernard MD   magnesium oxide (Mag-Ox) 400 mg (241.3 mg magnesium) tablet Take 1 tablet by mouth daily    Historical Provider, MD   metoprolol tartrate (Lopressor) 50 mg tablet Take 1 tablet by mouth 2 times a day. 8/1/24  8/1/25  Jj Bernard MD   nitroglycerin (Nitrostat) 0.4 mg SL tablet PLACE 1 TABLET UNDER THE TONGUE EVERY 5 MINUTES FOR UP TO 3 DOSES AS NEEDED FOR CHEST PAIN    Historical Provider, MD   oxybutynin XL (Ditropan-XL) 10 mg 24 hr tablet Take 1 tablet (10 mg) by mouth once daily. Do not crush, chew, or split. 9/26/24 9/21/25  Lee Pitt MD   oxyCODONE-acetaminophen (Percocet) 5-325 mg tablet Take 1 tablet by mouth every 4 hours if needed for moderate pain (4 - 6). 3/13/24   Lee Pitt MD   pantoprazole (ProtoNix) 40 mg EC tablet TAKE ONE TABLET BY MOUTH EVERY DAY IN THE MORNING BEFORE A MEAL. DO NOT CRUSH, CHEW OR SPLIT TABLET 1/30/25   Glenn Aggarwal MD   potassium chloride CR 20 mEq ER tablet TAKE ONE TABLET BY MOUTH DAILY AS NEEDED WHEN TAKING FUROSEMIDE 1/10/22   Historical Provider, MD   spironolactone (Aldactone) 50 mg tablet Take 1 tablet (50 mg) by mouth once daily. 3/13/25 3/13/26  Jj Bernard MD   tiZANidine (Zanaflex) 4 mg tablet Take 1 tablet (4 mg) by mouth every 8 hours if needed for muscle spasms. 6/26/21   Historical Provider, MD     Refer to updated medication list on file       Constitutional: Negative for fever, chills, or sweats   ENMT: Negative for nasal discharge, congestion, ear pain, mouth pain, throat pain   Respiratory: Negative for cough, wheezing, shortness of breath   Cardiac: Negative for chest pain, dyspnea on exertion, palpitations   Gastrointestinal: Negative for nausea, vomiting, diarrhea, constipation, abdominal pain  Genitourinary: Negative for dysuria, flank pain, frequency, hematuria   Musculoskeletal: Negative for decreased ROM, swelling, weakness Positive for back pain   Neurological: Negative for dizziness, confusion, headache  Psychiatric: Negative for mood changes   Skin: Negative for itching, rash, ulcer    Hematologic/Lymph: Negative for bruising, easy bleeding  Allergic/Immunologic: Negative itching, sneezing, swelling    "    Physical Exam  Constitutional:       Appearance: Normal appearance.   HENT:      Head: Normocephalic.   Eyes:      Extraocular Movements: Extraocular movements intact.      Comments: glasses   Cardiovascular:      Rate and Rhythm: Normal rate and regular rhythm.      Heart sounds: Normal heart sounds.   Pulmonary:      Effort: Pulmonary effort is normal.      Breath sounds: Normal breath sounds.   Abdominal:      General: Bowel sounds are normal.      Palpations: Abdomen is soft.   Musculoskeletal:         General: Normal range of motion.      Cervical back: Normal range of motion.   Skin:     General: Skin is warm and dry.   Neurological:      Mental Status: She is alert and oriented to person, place, and time.   Psychiatric:         Mood and Affect: Mood normal.      PAT AIRWAY:   Airway:     Neck ROM::  Full   One missing tooth    Testing/Diagnostic:   Lab Results   Component Value Date    HGBA1C 5.1 04/22/2022      Patient Specialist/PCP: Cardiologist: Micha    Visit Vitals  /56   Pulse 80   Temp 36.4 °C (97.5 °F) (Temporal)   Resp 16   Ht 1.537 m (5' 0.5\")   Wt 73.9 kg (162 lb 14.7 oz)   SpO2 100%   BMI 31.29 kg/m²   OB Status Hysterectomy   Smoking Status Former   BSA 1.78 m²       DASI Risk Score      Flowsheet Row Pre-Admission Testing from 4/2/2025 in [facility]  Most recent reading at 4/2/2025  1:34 PM Questionnaire Series Submission from 4/2/2025 in [facility] with Generic Provider Lee  Most recent reading at 4/2/2025  4:55 AM   Can you take care of yourself (eat, dress, bathe, or use toilet)?  2.75 filed at 04/02/2025 1334 2.75  filed at 04/02/2025 0455   Can you walk indoors, such as around your house? 1.75 filed at 04/02/2025 1334 1.75  filed at 04/02/2025 0455   Can you walk a block or two on level ground?  0 filed at 04/02/2025 1334 0  filed at 04/02/2025 0455   Can you climb a flight of stairs or walk up a hill? 5.5 filed at 04/02/2025 1334 5.5  filed at " 04/02/2025 0455   Can you run a short distance? 0 filed at 04/02/2025 1334 8  filed at 04/02/2025 0455   Can you do light work around the house like dusting or washing dishes? 0 filed at 04/02/2025 1334 --   Can you do moderate work around the house like vacuuming, sweeping floors or carrying groceries? 0 filed at 04/02/2025 1334 3.5  filed at 04/02/2025 0455   Can you do heavy work around the house like scrubbing floors or lifting and moving heavy furniture?  0 filed at 04/02/2025 1334 0  filed at 04/02/2025 0455   Can you do yard work like raking leaves, weeding or pushing a mower? 0 filed at 04/02/2025 1334 0  filed at 04/02/2025 0455   Can you have sexual relations? 0 filed at 04/02/2025 1334 --   Can you participate in moderate recreational activities like golf, bowling, dancing, doubles tennis or throwing a baseball or football? 0 filed at 04/02/2025 1334 0  filed at 04/02/2025 0455   Can you participate in strenous sports like swimming, singles tennis, football, basketball, or skiing? 0 filed at 04/02/2025 1334 0  filed at 04/02/2025 0455   DASI SCORE 10 filed at 04/02/2025 1334 --   METS Score (Will be calculated only when all the questions are answered) 4 filed at 04/02/2025 1334 --          Caprini DVT Assessment      Flowsheet Row Pre-Admission Testing from 4/2/2025 in [facility] [place] [facility] to Hosp-Admission (Discharged) from 9/28/2024 in [facility] with Glenn Aggarwal MD and Kaden Su MD   DVT Score (IF A SCORE IS NOT CALCULATING, MUST SELECT A BMI TO COMPLETE) 9 filed at 04/02/2025 1332 3 filed at 09/28/2024 1519   Medical Factors Present cancer, chemotherapy, or previous malignancy filed at 04/02/2025 1332 --   Surgical Factors Major surgery planned, including arthroscopic and laproscopic (1-2 hours) filed at 04/02/2025 1332 --   BMI (BMI MUST BE CHOSEN) 31-40 (Obesity) filed at 04/02/2025 1332 30 or less filed at 09/28/2024 1519          Modified Frailty Index       Flowsheet Row Pre-Admission Testing from 4/2/2025 in [facility]   Non-independent functional status (problems with dressing, bathing, personal grooming, or cooking) 0 filed at 04/02/2025 1334   History of diabetes mellitus  0 filed at 04/02/2025 1334   History of COPD 0 filed at 04/02/2025 1334   History of CHF No filed at 04/02/2025 1334   History of MI 0.0909 filed at 04/02/2025 1334   History of Percutaneous Coronary Intervention, Cardiac Surgery, or Angina No filed at 04/02/2025 1334   Hypertension requiring the use of medication  0.0909 filed at 04/02/2025 1334   Peripheral vascular disease 0 filed at 04/02/2025 1334   Impaired sensorium (cognitive impairement or loss, clouding, or delirium) 0 filed at 04/02/2025 1334   TIA or CVA withouy residual deficit 0 filed at 04/02/2025 1334   Cerebrovascular accident with deficit 0 filed at 04/02/2025 1334   Modified Frailty Index Calculator .1818 filed at 04/02/2025 1334          [id number] Stroke Risk Points  Current as of just now        N/A 0 to 9 Points:      Last Change: N/A          The [id number] risk score (Lip JOSE ELIAS, et al. 2009. © 2010 [employer]) quantifies the risk of stroke for a patient with atrial fibrillation. For patients without atrial fibrillation or under the age of 18 this score appears as N/A. Higher score values generally indicate higher risk of stroke.        This score is not applicable to this patient. Components are not calculated.          Revised Cardiac Risk Index      Flowsheet Row Pre-Admission Testing from 4/2/2025 in [facility]   High-Risk Surgery (Intraperitoneal, Intrathoracic,Suprainguinal vascular) 0 filed at 04/02/2025 1334   History of ischemic heart disease (History of MI, History of positive exercuse test, Current chest paint considered due to myocardial ischemia, Use of nitrate therapy, ECG with pathological Q Waves) 0 filed at 04/02/2025 1334   History of  congestive heart failure (pulmonary edemia, bilateral rales or S3 gallop, Paroxysmal nocturnal dyspnea, CXR showing pulmonary vascular redistribution) 0 filed at 2025 1334   History of cerebrovascular disease (Prior TIA or stroke) 0 filed at 2025 1334   Pre-operative insulin treatment 0 filed at 2025 1334   Pre-operative creatinine>2 mg/dl 0 filed at 2025 1334   Revised Cardiac Risk Calculator 0 filed at 2025 1334          Apfel Simplified Score      Flowsheet Row Pre-Admission Testing from 2025 in [facility] [place] [place]   Smoking status 1 filed at 2025 1334   History of motion sickness or PONV  1 filed at 2025 1334   Use of postoperative opioids 1 filed at 2025 1334   Gender - Female 1=Yes filed at 2025 1334   Apfel Simplified Score Calculator 4 filed at 2025 1334          Risk Analysis Index Results This Encounter         2025  1335             Do you live in a place other than your own home?: 0    When did you begin living in the place you are currently residing?: Greater than one year ago    Any kidney failure, kidney not working well, or seeing a kidney doctor (nephrologist)? If yes, was this for kidney stones or another problem?: 0 No    Any history of chronic (long-term) congestive heart failure (CHF)?: 0 No    Any shortness of breath when resting?: 0 No    In the past five years, have you been diagnosed with or treated for cancer?: No    During the last 3 months has it become difficult for you to remember things or organize your thoughts?: 0 No    Have you lost weight of 10 pounds or more in the past 3 months without trying?: 4 Yes    Do you have any loss of appetitie?: 0 No    Getting Around (Mobility): 0 Can get around without help    Eatin Can plan and prepare own meals    Toiletin Can use toilet without any help    Personal Hygiene (Bathing, Hand Washing, Changing Clothes): 0 Can shower or bathe without any help    HAM Cancer  History: Patient does not indicate history of cancer    Total Risk Analysis Index Score Without Cancer: 22    Total Risk Analysis Index Score: 22          Stop Bang Score      Flowsheet Row Pre-Admission Testing from 4/2/2025 in [facility] [place]  Most recent reading at 4/2/2025  1:33 PM Questionnaire Series Submission from 4/2/2025 in [facility] with Generic Provider Lee  Most recent reading at 4/2/2025  4:55 AM   Do you snore loudly? 0 filed at 04/02/2025 1333 0  filed at 04/02/2025 0455   Do you often feel tired or fatigued after your sleep? 0 filed at 04/02/2025 1333 0  filed at 04/02/2025 0455   Has anyone ever observed you stop breathing in your sleep? 0 filed at 04/02/2025 1333 0  filed at 04/02/2025 0455   Do you have or are you being treated for high blood pressure? 1 filed at 04/02/2025 1333 1  filed at 04/02/2025 0455   Recent BMI (Calculated) 31.3 filed at 04/02/2025 1333 30.5  filed at 04/02/2025 0455   Is BMI greater than 35 kg/m2? 0=No filed at 04/02/2025 1333 0=No  filed at 04/02/2025 0455   Age older than 50 years old? 1=Yes filed at 04/02/2025 1333 1=Yes  filed at 04/02/2025 0455   Is your neck circumference greater than 17 inches (Male) or 16 inches (Female)? 0 filed at 04/02/2025 1333 --   Gender - Male 0=No filed at 04/02/2025 1333 0=No  filed at 04/02/2025 0455   STOP-BANG Total Score 2 filed at 04/02/2025 1333 --          Prodigy: High Risk  Total Score: 11              Prodigy Age Score      Prodigy Previous Opioid Use Score           ARISCAT Score for Postoperative Pulmonary Complications      Flowsheet Row Pre-Admission Testing from 4/2/2025 in [facility] [place]   Age Calculated Score 3 filed at 04/02/2025 1335   Preoperative SpO2 0 filed at 04/02/2025 1335   Respiratory infection in the last month Either upper or lower (i.e., URI, bronchitis, pneumonia), with fever and antibiotic treatment 0 filed at 04/02/2025 1335   Preoperative anemia (Hgb less than 10 g/dl) 0  filed at 04/02/2025 1335   Surgical incision  0 filed at 04/02/2025 1335   Duration of surgery  0 filed at 04/02/2025 1335   Emergency Procedure  0 filed at 04/02/2025 1335   ARISCAT Total Score  3 filed at 04/02/2025 1335          Marvin Perioperative Risk for Myocardial Infarction or Cardiac Arrest (EARL)      Flowsheet Row Pre-Admission Testing from 4/2/2025 in [facility]   Calculated Age Score 1.28 filed at 04/02/2025 1335   Functional Status  0 filed at 04/02/2025 1335   ASA Class  -1.92 filed at 04/02/2025 1335   Creatinine 0 filed at 04/02/2025 1335   Type of Procedure  1.60 filed at 04/02/2025 1335   EARL Total Score  -4.29 filed at 04/02/2025 1335   EARL % 1.35 filed at 04/02/2025 1335          Assessment and Plan:     Preop:   OR with Dr Felix.   Labs today as ordered.   HgbAIC today  EKG obtained and enclosed. NSR    Neurologic:   Preoperative brain exercise educational handout provided to patient.  The patient is at an increased risk for perioperative stroke secondary to cardiac disease, Hypertension, Diabetes, Hyperlipidemia     Cardiac:  DASI Score: 10   MET Score: 4  RCRI  0 which is 3.9% 30 day risk of MACE (risk for cardiac death, nonfatal myocardial infarction, and nonfactal cardiac arrest)  EARL score which indicates a 1.35% risk of intraoperative or 30-day postoperative MACE  CAD  Hx MI 2019, 2 cardiac stents  HTN: compliant with medication  Hyperlipidemia: compliant with Statin  Hx BLE edema: takes Lasix and Potassium prn  Will be off Plavix for approximately 7-8 days at time of procedure    Pulmonary:   STOP-BANG score of 2. Low risk of obstructive sleep apnea.   ARISCAT: 3 points which is a low (1.6%) risk of in-hospital post-op pulmonary complications   Former smoker    Endocrine:  Type 2 Diabetes: compliant with medication, no insulin      GI:  Apfel: 4 points 79% risk for post operative N/V    /Renal:   Above    Neuro-muscular:   DDD, Herniated Disc: compliant with  medication (In pain management)    Hematologic:   Caprini score 9, patient at highest risk for perioperative DVT. Patient provided with VTE education/handout.   Low iron levels: receives IV iron infusions every 6-9 months. Last had in November    Anesthesia: Post op N/V     *See risk scores as previously documented

## 2025-04-02 NOTE — PREPROCEDURE INSTRUCTIONS
Thank you for visiting Preadmission Testing at [facility]. If you have any changes to your health condition, please call the SURGEON's office to alert them and give them details of your symptoms.        Preoperative Brain Exercises    What are brain exercises?  A brain exercise is any activity that engages your thinking (cognitive) skills.    What types of activities are considered brain exercises?  Jigsaw puzzles, crossword puzzles, word jumble, memory games, word search, and many more.  Many can be found free online or on your phone via a mobile yessica.    Why should I do brain exercises before my surgery?  More recent research has shown brain exercise before surgery can lower the risk of postoperative delirium (confusion) which can be especially important for older adults.  Patients who did brain exercises for 5 to 10 hours the days before surgery, cut their risk of postoperative delirium in half up to 1 week after surgery.      Preoperative Deep Breathing Exercises    Why it is important to do deep breathing exercises before my surgery?  Deep breathing exercises strengthen your breathing muscles.  This helps you to recover after your surgery and decreases the chance of breathing complications.    How are the deep breathing exercises done?  Sit straight with your back supported.  Breathe in deeply and slowly through your nose. Your lower rib cage should expand and your abdomen may move forward.  Hold that breath for 3 to 5 seconds.  Breathe out through pursed lips, slowly and completely.  Rest and repeat 10 times every hour while awake.  Rest longer if you become dizzy or lightheaded.      Patient and Family Education   Ways You Can Help Prevent Blood Clots     This handout explains some simple things you can do to help prevent blood clots.      Blood clots are blockages that can form in the body's veins. When a blood clot forms in your deep veins, it may be called a deep vein thrombosis, or DVT for short. Blood clots can  happen in any part of the body where blood flows, but they are most common in the arms and legs. If a piece of a blood clot breaks free and travels to the lungs, it is called a pulmonary embolus (PE). A PE can be a very serious problem.      Being in the hospital or having surgery can raise your chances of getting a blood clot because you may not be well enough to move around as much as you normally do.      Ways you can help prevent blood clots in the hospital         Wearing SCDs. SCDs stands for Sequential Compression Devices.   SCDs are special sleeves that wrap around your legs  They attach to a pump that fills them with air to gently squeeze your legs every few minutes.   This helps return the blood in your legs to your heart.   SCDs should only be taken off when walking or bathing.   SCDs may not be comfortable, but they can help save your life.               Wearing compression stockings - if your doctor orders them. These special snug fitting stockings gently squeeze your legs to help blood flow.       Walking. Walking helps move the blood in your legs.   If your doctor says it is ok, try walking the halls at least   5 times a day. Ask us to help you get up, so you don't fall.      Taking any blood thinning medicines your doctor orders.          ©[facility]; 3/23        Ways you can help prevent blood clots at home       Wearing compression stockings - if your doctor orders them. ? Walking - to help move the blood in your legs.       Taking any blood thinning medicines your doctor orders.      Signs of a blood clot or PE      Tell your doctor or nurse know right away if you have of the problems listed below.    If you are at home, seek medical care right away. Call 911 for chest pain or problems breathing.          Signs of a blood clot (DVT) - such as pain,  swelling, redness or warmth in your arm or leg      Signs of a pulmonary embolism (PE) - such as chest     pain or feeling short of breath

## 2025-04-02 NOTE — PREPROCEDURE INSTRUCTIONS
Medication List            Accurate as of April 2, 2025  1:52 PM. Always use your most recent med list.                acarbose 25 mg tablet  Commonly known as: Precose  Medication Adjustments for Surgery: Do Not take on the morning of surgery  Additional Medication Adjustments for Surgery: Other (Comment)  Notes to patient: Take with meal the evening before surgery     amitriptyline 25 mg tablet  Commonly known as: Elavil  Medication Adjustments for Surgery: Take/Use as prescribed     atorvastatin 80 mg tablet  Commonly known as: Lipitor  TAKE ONE TABLET BY MOUTH ONCE DAILY AT BEDTIME  Medication Adjustments for Surgery: Take/Use as prescribed     blood sugar diagnostic     cholecalciferol (vitamin D3) 25 mcg (1,000 unit) tablet,chewable  Additional Medication Adjustments for Surgery: Take last dose 7 days before surgery     clonazePAM 0.5 mg tablet  Commonly known as: KlonoPIN  Medication Adjustments for Surgery: Take/Use as prescribed     clopidogrel 75 mg tablet  Commonly known as: Plavix  Take 1 tablet (75 mg) by mouth once daily.  Additional Medication Adjustments for Surgery: Other (Comment)  Notes to patient: Collaborate with physician     ezetimibe 10 mg tablet  Commonly known as: Zetia  Take 1 tablet (10 mg) by mouth once daily.  Medication Adjustments for Surgery: Take last dose 1 day (24 hours) before surgery     furosemide 40 mg tablet  Commonly known as: Lasix  Medication Adjustments for Surgery: Do Not take on the morning of surgery     losartan 50 mg tablet  Commonly known as: Cozaar  Take 1 tablet (50 mg) by mouth 2 times a day.  Medication Adjustments for Surgery: Take last dose 1 day (24 hours) before surgery     magnesium oxide 400 mg (241.3 mg magnesium) tablet  Commonly known as: Mag-Ox  Medication Adjustments for Surgery: Do Not take on the morning of surgery     metoprolol tartrate 50 mg tablet  Commonly known as: Lopressor  Take 1 tablet by mouth 2 times a day.  Medication Adjustments for  Surgery: Take/Use as prescribed     nitroglycerin 0.4 mg SL tablet  Commonly known as: Nitrostat  Medication Adjustments for Surgery: Take/Use as prescribed     oxybutynin XL 10 mg 24 hr tablet  Commonly known as: Ditropan-XL  Take 1 tablet (10 mg) by mouth once daily. Do not crush, chew, or split.  Medication Adjustments for Surgery: Do Not take on the morning of surgery     oxyCODONE-acetaminophen 5-325 mg tablet  Commonly known as: Percocet  Take 1 tablet by mouth every 4 hours if needed for moderate pain (4 - 6).  Medication Adjustments for Surgery: Take/Use as prescribed     pantoprazole 40 mg EC tablet  Commonly known as: ProtoNix  TAKE ONE TABLET BY MOUTH EVERY DAY IN THE MORNING BEFORE A MEAL. DO NOT CRUSH, CHEW OR SPLIT TABLET  Medication Adjustments for Surgery: Take/Use as prescribed     potassium chloride CR 20 mEq ER tablet  Commonly known as: Klor-Con M20  Medication Adjustments for Surgery: Do Not take on the morning of surgery     spironolactone 50 mg tablet  Commonly known as: Aldactone  Take 1 tablet (50 mg) by mouth once daily.  Medication Adjustments for Surgery: Take/Use as prescribed     tiZANidine 4 mg tablet  Commonly known as: Zanaflex  Medication Adjustments for Surgery: Take/Use as prescribed                                  PRE-OPERATIVE INSTRUCTIONS    You will receive notification one business day prior to your procedure to confirm your arrival time. It is important that you answer your phone and/or check your messages during this time. If you do not hear from the surgery center by 5 pm. the day before your procedure, please call [phone].     Please enter the building through the Outpatient entrance and take the elevator off the lobby to the 2nd floor then check in at the Outpatient Surgery desk on the 2nd floor.    INSTRUCTIONS:  Talk to your surgeon for instructions if you should stop your aspirin, blood thinner, or diabetes medicines.  DO NOT take any multivitamins or over the  counter supplements for 7-10 days before surgery.  If not being admitted, you must have an adult immediately available to drive you home after surgery. We also highly recommend you have someone stay with you for the entire day and night of your surgery.  For children having surgery, a parent or legal guardian must accompany them to the surgery center. If this is not possible, please call [phone] to make additional arrangements.  For adults who are unable to consent or make medical decisions for themselves, a legal guardian or Power of  must accompany them to the surgery center. If this is not possible, please call [phone] to make additional arrangements.  Wear comfortable, loose fitting clothing.  All jewelry and piercings must be removed. If you are unable to remove an item or have a dermal piercing, please be sure to tell the nurse when you arrive for surgery.  Nail polish and make-up must be removed.  Avoid smoking or consuming alcohol for 24 hours before surgery.  To help prevent infection, please take a shower/bath and wash your hair the night before and/or morning of surgery (or follow other specific bathing instructions provided).    Preoperative Fasting Guidelines    Why must I stop eating and drinking near surgery time?  With sedation, food or liquid in your stomach can enter your lungs causing serious complications  Increases nausea and vomiting    When do I need to stop eating and drinking before my surgery?  Do not eat any solid food after midnight the night before your surgery/procedure unless otherwise instructed by your surgeon.   You may have up to 13.5 ounces of clear liquid until TWO hours before your instructed arrival time to the hospital.  This includes water, black tea/coffee, (no milk or cream) apple juice, and electrolyte drinks (Gatorade).   You may chew gum until TWO hours before your surgery/procedure      If applicable, notify your surgeons office immediately of any new  skin changes that occur to the surgical limb.      If you have any questions or concerns, please call Pre-Admission Testing at [phone].     Home Preoperative Antibacterial Shower with Chlorhexidine gluconate (CHG)     What is a home preoperative antibacterial shower?  This shower is a way of cleaning the skin with a germ killing solution before surgery. The solution contains chlorhexidine gluconate, commonly known as CHG. CHG is a skin cleanser with germ killing ability. Let your doctor know if you are allergic to chlorhexidine.    Why do I need to take a preoperative antibacterial shower?  Skin is not sterile. It is best to try to make your skin as free of germs as possible before surgery. Proper cleansing with a germ killing soap before surgery can lower the number of germs on your skin. This helps to reduce the risk of infection at the surgical site. Following the instructions listed below will help you prepare your skin for surgery.    How do I use the solution?  Begin using your CHG soap the night before and again the morning of your procedure.   Do not shave the day before or day of surgery.  Remove all jewelry until after surgery. Take off rings and take out all body-piercing jewelry.  Wash your face and hair with normal soap and shampoo before you use the CHG soap.  Apply the CHG solution to a clean wet washcloth. Move away from the water to avoid premature rinsing of the CHG soap as you are applying. Firmly lather your entire body from the neck down. Do not use CHG on your face, eyes, ears, or genitals.   Pay special attention to the area where your incisions will be located.  Do not scrub your skin too hard.  It is important to allow the CHG soap to sit on your skin for 3-5 minutes.  Rinse the solution off your body completely. Do not wash with your normal soap after the CHG soap solution.  Pat yourself dry with a clean, soft towel.  Do not apply powders, lotions or deodorants as these might block  how the CHG soap works.   Dress in clean clothing.  Be sure to sleep with clean, freshly laundered sheets.  Be aware that CHG can cause stains on fabric. Rinse your washcloth and other linens that have contact with CHG completely. Use only non-chlorine detergents to launder the items used.

## 2025-04-03 ENCOUNTER — ANESTHESIA EVENT (OUTPATIENT)
Dept: OPERATING ROOM | Facility: HOSPITAL | Age: 65
End: 2025-04-03
Payer: MEDICARE

## 2025-04-03 LAB
ATRIAL RATE: 62 BPM
EST. AVERAGE GLUCOSE BLD GHB EST-MCNC: 140 MG/DL
HBA1C MFR BLD: 6.5 %
P AXIS: -11 DEGREES
P OFFSET: 213 MS
P ONSET: 163 MS
PR INTERVAL: 120 MS
Q ONSET: 223 MS
QRS COUNT: 11 BEATS
QRS DURATION: 74 MS
QT INTERVAL: 378 MS
QTC CALCULATION(BAZETT): 383 MS
QTC FREDERICIA: 382 MS
R AXIS: 55 DEGREES
T AXIS: 46 DEGREES
T OFFSET: 412 MS
VENTRICULAR RATE: 62 BPM

## 2025-04-03 NOTE — DISCHARGE INSTRUCTIONS
DEPARTMENT OF UROLOGY  DISCHARGE INSTRUCTIONS -- Transurethral resection of bladder tumor  Outpatient Surgery    C O N F I D E N T I A L   I N F O R M A T I O N    Cuca Laguerre      Call [phone] during regular daytime business hours (8:00 am - 5:00 pm) and after 5:00 pm and ask for the Urology resident with any questions or concerns.      If it is a life-threatening situation, proceed to the nearest emergency department.        Follow-up appointment:  to be scheduled     Thank you for the opportunity to care for you today.  Your health and healing are very important to us.  We hope we made you feel as comfortable as possible and are committed to your recovery and continued well-being.      The following is a brief overview of your transurethral bladder tumor resection today. Some of the information contained on this summary may be confidential.  This information should be kept in your records and should be shared with your regular doctor.    Physicians:   Dr. Felix      Procedure performed: bladder tumor resection/biopsy  Pending results:   pathology of tissue taken from your bladder (we will go over these results at your post-operative appointment.)    What to Expect During your Recovery and Home Care  Anesthesia Side Effects   You received anesthesia today.  You may feel sleepy, tired, or have a sore throat.   You may also feel drowsiness, dizziness, or inability to think clearly.  For your safety, do not drive, drink alcoholic beverages, take any unprescribed medication or make any important decisions for 24 hours.  A responsible adult should be with you for 24 hours.        Activity and Recovery    No heavy lifting over ten pounds. Limit activity if you have a urinary catheter in place. Avoid activities that would cause pulling or tugging on your catheter.    Do not drive or operate heavy machinery while taking narcotic pain medications as these medications can alter perception, impair judgement, and slow  reaction times.      Pain Control  Unfortunately, you may experience pain after your procedure.  Adequate management can include alternative measures to help ease your pain and can include over the counter Tylenol. Do not take more than 4,000mg of Tylenol in a 24-hour period.      You may also experience bladder spasms due to the catheter. Ditropan may be prescribed to help alleviate this problem.    You may be prescribed Pyridium which can help relieve the feeling of burning with urination. Pyridium will turn your orange yellow and can stain underwear/clothes.    Nausea/Vomiting   Clear liquids are best tolerated at first. Start slow, advance your diet as tolerated to normal foods. Avoid spicy, greasy, heavy foods at first. Also, you may feel nauseous or like you need to vomit if you take any type of medication on an empty stomach.  Call your physician if you are unable to eat or drink and have persistent vomiting.    Signs of Bleeding   You are going to have some blood in your urine. Your urine will be light pink to yellow. If you have a catheter you always want to look at the urine in the tubing of your catheter and not in the large urine collection bag to check for bleeding. If urine becomes thick dark venancio red, has large clots or stops draining, please notify your physician.    Treatment/wound care:   Keep area(s) clean and dry. Clean around insertion site of catheter daily with mild soap and water.  It is okay to shower 24 hours after time of surgery.    Do not submerge your catheter in standing water until seen for follow up appointment (no tub bathing, swimming, or hot tubs).      Signs of Infection  Signs of infection can include fever, drainage(green/yellow), chills, burning sensation with passing of urine, catheter leakage, or severe abdominal pain.  If you see any of these occur, please contact your doctor's office at [phone].  Any fever higher than 100.4, especially if associated with an ill  feeling, abdominal pain, chills, or nausea should be reported to your surgeon.        Assist in bowel movements/urination  Increase fiber in diet  Increase water (6 to 8 glasses)  Increase walking   Urination should occur within 6 hours of anesthesia  If you have tried these methods and your bladder still feels full and you cannot use the bathroom, please go to your nearest Emergency room.

## 2025-04-04 ENCOUNTER — PHARMACY VISIT (OUTPATIENT)
Dept: PHARMACY | Facility: CLINIC | Age: 65
End: 2025-04-04
Payer: COMMERCIAL

## 2025-04-04 ENCOUNTER — HOSPITAL ENCOUNTER (OUTPATIENT)
Facility: HOSPITAL | Age: 65
Setting detail: OUTPATIENT SURGERY
Discharge: HOME | End: 2025-04-04
Attending: UROLOGY | Admitting: UROLOGY
Payer: MEDICARE

## 2025-04-04 ENCOUNTER — ANESTHESIA (OUTPATIENT)
Dept: OPERATING ROOM | Facility: HOSPITAL | Age: 65
End: 2025-04-04
Payer: MEDICARE

## 2025-04-04 VITALS
SYSTOLIC BLOOD PRESSURE: 142 MMHG | WEIGHT: 156 LBS | RESPIRATION RATE: 20 BRPM | BODY MASS INDEX: 29.45 KG/M2 | OXYGEN SATURATION: 99 % | HEART RATE: 69 BPM | TEMPERATURE: 96.8 F | DIASTOLIC BLOOD PRESSURE: 63 MMHG | HEIGHT: 61 IN

## 2025-04-04 DIAGNOSIS — C68.9 UROTHELIAL CARCINOMA: Primary | ICD-10-CM

## 2025-04-04 LAB
ABO GROUP (TYPE) IN BLOOD: NORMAL
ABO GROUP (TYPE) IN BLOOD: NORMAL
ANTIBODY SCREEN: NORMAL
BASOPHILS # BLD MANUAL: 0.16 X10*3/UL (ref 0–0.1)
BASOPHILS NFR BLD MANUAL: 2 %
EOSINOPHIL # BLD MANUAL: 0 X10*3/UL (ref 0–0.7)
EOSINOPHIL NFR BLD MANUAL: 0 %
ERYTHROCYTE [DISTWIDTH] IN BLOOD BY AUTOMATED COUNT: 20.3 % (ref 11.5–14.5)
ERYTHROCYTE [DISTWIDTH] IN BLOOD BY AUTOMATED COUNT: 21 % (ref 11.5–14.5)
GLUCOSE BLD MANUAL STRIP-MCNC: 133 MG/DL (ref 74–99)
HCT VFR BLD AUTO: 24.6 % (ref 36–46)
HCT VFR BLD AUTO: 32.2 % (ref 36–46)
HGB BLD-MCNC: 6.9 G/DL (ref 12–16)
HGB BLD-MCNC: 9.2 G/DL (ref 12–16)
IMM GRANULOCYTES # BLD AUTO: 0.02 X10*3/UL (ref 0–0.7)
IMM GRANULOCYTES NFR BLD AUTO: 0.2 % (ref 0–0.9)
LYMPHOCYTES # BLD MANUAL: 2.21 X10*3/UL (ref 1.2–4.8)
LYMPHOCYTES NFR BLD MANUAL: 27 %
MCH RBC QN AUTO: 20.4 PG (ref 26–34)
MCH RBC QN AUTO: 21.4 PG (ref 26–34)
MCHC RBC AUTO-ENTMCNC: 28 G/DL (ref 32–36)
MCHC RBC AUTO-ENTMCNC: 28.6 G/DL (ref 32–36)
MCV RBC AUTO: 73 FL (ref 80–100)
MCV RBC AUTO: 75 FL (ref 80–100)
MONOCYTES # BLD MANUAL: 0.16 X10*3/UL (ref 0.1–1)
MONOCYTES NFR BLD MANUAL: 2 %
NEUTS SEG # BLD MANUAL: 5.49 X10*3/UL (ref 1.2–7)
NEUTS SEG NFR BLD MANUAL: 67 %
NRBC BLD-RTO: 0 /100 WBCS (ref 0–0)
NRBC BLD-RTO: 0 /100 WBCS (ref 0–0)
PLATELET # BLD AUTO: 239 X10*3/UL (ref 150–450)
PLATELET # BLD AUTO: 271 X10*3/UL (ref 150–450)
POLYCHROMASIA BLD QL SMEAR: ABNORMAL
RBC # BLD AUTO: 3.38 X10*6/UL (ref 4–5.2)
RBC # BLD AUTO: 4.29 X10*6/UL (ref 4–5.2)
RBC MORPH BLD: ABNORMAL
RH FACTOR (ANTIGEN D): NORMAL
RH FACTOR (ANTIGEN D): NORMAL
STOMATOCYTES BLD QL SMEAR: ABNORMAL
TOTAL CELLS COUNTED BLD: 100
VARIANT LYMPHS # BLD MANUAL: 0.16 X10*3/UL (ref 0–0.5)
VARIANT LYMPHS NFR BLD: 2 %
WBC # BLD AUTO: 10.2 X10*3/UL (ref 4.4–11.3)
WBC # BLD AUTO: 8.2 X10*3/UL (ref 4.4–11.3)

## 2025-04-04 PROCEDURE — 52235 CYSTOSCOPY AND TREATMENT: CPT | Performed by: UROLOGY

## 2025-04-04 PROCEDURE — 88307 TISSUE EXAM BY PATHOLOGIST: CPT | Performed by: PATHOLOGY

## 2025-04-04 PROCEDURE — 88307 TISSUE EXAM BY PATHOLOGIST: CPT | Mod: TC,STJLAB | Performed by: UROLOGY

## 2025-04-04 PROCEDURE — 85027 COMPLETE CBC AUTOMATED: CPT | Performed by: ANESTHESIOLOGY

## 2025-04-04 PROCEDURE — 2500000004 HC RX 250 GENERAL PHARMACY W/ HCPCS (ALT 636 FOR OP/ED): Performed by: NURSE ANESTHETIST, CERTIFIED REGISTERED

## 2025-04-04 PROCEDURE — 2500000001 HC RX 250 WO HCPCS SELF ADMINISTERED DRUGS (ALT 637 FOR MEDICARE OP): Performed by: ANESTHESIOLOGY

## 2025-04-04 PROCEDURE — 85027 COMPLETE CBC AUTOMATED: CPT | Performed by: UROLOGY

## 2025-04-04 PROCEDURE — 82947 ASSAY GLUCOSE BLOOD QUANT: CPT

## 2025-04-04 PROCEDURE — 7100000010 HC PHASE TWO TIME - EACH INCREMENTAL 1 MINUTE: Performed by: UROLOGY

## 2025-04-04 PROCEDURE — 7100000009 HC PHASE TWO TIME - INITIAL BASE CHARGE: Performed by: UROLOGY

## 2025-04-04 PROCEDURE — 2500000004 HC RX 250 GENERAL PHARMACY W/ HCPCS (ALT 636 FOR OP/ED): Performed by: UROLOGY

## 2025-04-04 PROCEDURE — 86922 COMPATIBILITY TEST ANTIGLOB: CPT

## 2025-04-04 PROCEDURE — P9016 RBC LEUKOCYTES REDUCED: HCPCS

## 2025-04-04 PROCEDURE — 2500000004 HC RX 250 GENERAL PHARMACY W/ HCPCS (ALT 636 FOR OP/ED): Mod: JZ | Performed by: ANESTHESIOLOGY

## 2025-04-04 PROCEDURE — 2500000001 HC RX 250 WO HCPCS SELF ADMINISTERED DRUGS (ALT 637 FOR MEDICARE OP): Performed by: UROLOGY

## 2025-04-04 PROCEDURE — 2720000007 HC OR 272 NO HCPCS: Performed by: UROLOGY

## 2025-04-04 PROCEDURE — 85007 BL SMEAR W/DIFF WBC COUNT: CPT | Performed by: ANESTHESIOLOGY

## 2025-04-04 PROCEDURE — 36415 COLL VENOUS BLD VENIPUNCTURE: CPT | Performed by: ANESTHESIOLOGY

## 2025-04-04 PROCEDURE — 7100000001 HC RECOVERY ROOM TIME - INITIAL BASE CHARGE: Performed by: UROLOGY

## 2025-04-04 PROCEDURE — 3600000003 HC OR TIME - INITIAL BASE CHARGE - PROCEDURE LEVEL THREE: Performed by: UROLOGY

## 2025-04-04 PROCEDURE — 3700000002 HC GENERAL ANESTHESIA TIME - EACH INCREMENTAL 1 MINUTE: Performed by: UROLOGY

## 2025-04-04 PROCEDURE — 3700000001 HC GENERAL ANESTHESIA TIME - INITIAL BASE CHARGE: Performed by: UROLOGY

## 2025-04-04 PROCEDURE — 3600000008 HC OR TIME - EACH INCREMENTAL 1 MINUTE - PROCEDURE LEVEL THREE: Performed by: UROLOGY

## 2025-04-04 PROCEDURE — 7100000002 HC RECOVERY ROOM TIME - EACH INCREMENTAL 1 MINUTE: Performed by: UROLOGY

## 2025-04-04 PROCEDURE — 36430 TRANSFUSION BLD/BLD COMPNT: CPT

## 2025-04-04 PROCEDURE — RXMED WILLOW AMBULATORY MEDICATION CHARGE

## 2025-04-04 PROCEDURE — 86901 BLOOD TYPING SEROLOGIC RH(D): CPT | Performed by: ANESTHESIOLOGY

## 2025-04-04 RX ORDER — FENTANYL CITRATE 50 UG/ML
12.5 INJECTION, SOLUTION INTRAMUSCULAR; INTRAVENOUS EVERY 5 MIN PRN
Status: DISCONTINUED | OUTPATIENT
Start: 2025-04-04 | End: 2025-04-04 | Stop reason: HOSPADM

## 2025-04-04 RX ORDER — OXYCODONE HYDROCHLORIDE 5 MG/1
5 TABLET ORAL EVERY 4 HOURS PRN
Status: DISCONTINUED | OUTPATIENT
Start: 2025-04-04 | End: 2025-04-04 | Stop reason: HOSPADM

## 2025-04-04 RX ORDER — SODIUM CHLORIDE, SODIUM LACTATE, POTASSIUM CHLORIDE, CALCIUM CHLORIDE 600; 310; 30; 20 MG/100ML; MG/100ML; MG/100ML; MG/100ML
100 INJECTION, SOLUTION INTRAVENOUS CONTINUOUS
Status: DISCONTINUED | OUTPATIENT
Start: 2025-04-04 | End: 2025-04-04 | Stop reason: HOSPADM

## 2025-04-04 RX ORDER — ONDANSETRON HYDROCHLORIDE 2 MG/ML
INJECTION, SOLUTION INTRAVENOUS AS NEEDED
Status: DISCONTINUED | OUTPATIENT
Start: 2025-04-04 | End: 2025-04-04

## 2025-04-04 RX ORDER — ROCURONIUM BROMIDE 10 MG/ML
INJECTION, SOLUTION INTRAVENOUS AS NEEDED
Status: DISCONTINUED | OUTPATIENT
Start: 2025-04-04 | End: 2025-04-04

## 2025-04-04 RX ORDER — PROPOFOL 10 MG/ML
INJECTION, EMULSION INTRAVENOUS AS NEEDED
Status: DISCONTINUED | OUTPATIENT
Start: 2025-04-04 | End: 2025-04-04

## 2025-04-04 RX ORDER — HYDROMORPHONE HYDROCHLORIDE 1 MG/ML
1 INJECTION, SOLUTION INTRAMUSCULAR; INTRAVENOUS; SUBCUTANEOUS EVERY 5 MIN PRN
Status: DISCONTINUED | OUTPATIENT
Start: 2025-04-04 | End: 2025-04-04 | Stop reason: HOSPADM

## 2025-04-04 RX ORDER — MIDAZOLAM HYDROCHLORIDE 1 MG/ML
1 INJECTION, SOLUTION INTRAMUSCULAR; INTRAVENOUS ONCE AS NEEDED
Status: DISCONTINUED | OUTPATIENT
Start: 2025-04-04 | End: 2025-04-04 | Stop reason: HOSPADM

## 2025-04-04 RX ORDER — OXYCODONE AND ACETAMINOPHEN 5; 325 MG/1; MG/1
1 TABLET ORAL ONCE
Status: COMPLETED | OUTPATIENT
Start: 2025-04-04 | End: 2025-04-04

## 2025-04-04 RX ORDER — LIDOCAINE HYDROCHLORIDE 10 MG/ML
0.1 INJECTION, SOLUTION INFILTRATION; PERINEURAL ONCE
Status: DISCONTINUED | OUTPATIENT
Start: 2025-04-04 | End: 2025-04-04 | Stop reason: HOSPADM

## 2025-04-04 RX ORDER — ONDANSETRON HYDROCHLORIDE 2 MG/ML
4 INJECTION, SOLUTION INTRAVENOUS ONCE AS NEEDED
Status: COMPLETED | OUTPATIENT
Start: 2025-04-04 | End: 2025-04-04

## 2025-04-04 RX ORDER — PHENAZOPYRIDINE HYDROCHLORIDE 100 MG/1
100 TABLET, FILM COATED ORAL
Status: DISCONTINUED | OUTPATIENT
Start: 2025-04-04 | End: 2025-04-04 | Stop reason: HOSPADM

## 2025-04-04 RX ORDER — NITROFURANTOIN 25; 75 MG/1; MG/1
100 CAPSULE ORAL 2 TIMES DAILY
Qty: 6 CAPSULE | Refills: 0 | Status: SHIPPED | OUTPATIENT
Start: 2025-04-04

## 2025-04-04 RX ORDER — FENTANYL CITRATE 50 UG/ML
INJECTION, SOLUTION INTRAMUSCULAR; INTRAVENOUS AS NEEDED
Status: DISCONTINUED | OUTPATIENT
Start: 2025-04-04 | End: 2025-04-04

## 2025-04-04 RX ORDER — LIDOCAINE HYDROCHLORIDE 20 MG/ML
INJECTION, SOLUTION EPIDURAL; INFILTRATION; INTRACAUDAL; PERINEURAL AS NEEDED
Status: DISCONTINUED | OUTPATIENT
Start: 2025-04-04 | End: 2025-04-04

## 2025-04-04 RX ORDER — CEFAZOLIN SODIUM 2 G/100ML
2 INJECTION, SOLUTION INTRAVENOUS ONCE
Status: COMPLETED | OUTPATIENT
Start: 2025-04-04 | End: 2025-04-04

## 2025-04-04 RX ORDER — PHENAZOPYRIDINE HYDROCHLORIDE 200 MG/1
200 TABLET, FILM COATED ORAL 3 TIMES DAILY PRN
Qty: 20 TABLET | Refills: 0 | Status: SHIPPED | OUTPATIENT
Start: 2025-04-04

## 2025-04-04 RX ORDER — DIPHENHYDRAMINE HYDROCHLORIDE 50 MG/ML
12.5 INJECTION, SOLUTION INTRAMUSCULAR; INTRAVENOUS ONCE AS NEEDED
Status: DISCONTINUED | OUTPATIENT
Start: 2025-04-04 | End: 2025-04-04 | Stop reason: HOSPADM

## 2025-04-04 RX ORDER — ALBUTEROL SULFATE 0.83 MG/ML
2.5 SOLUTION RESPIRATORY (INHALATION) ONCE AS NEEDED
Status: DISCONTINUED | OUTPATIENT
Start: 2025-04-04 | End: 2025-04-04 | Stop reason: HOSPADM

## 2025-04-04 RX ORDER — ACETAMINOPHEN 325 MG/1
975 TABLET ORAL ONCE
Status: DISCONTINUED | OUTPATIENT
Start: 2025-04-04 | End: 2025-04-04 | Stop reason: HOSPADM

## 2025-04-04 RX ORDER — HYDRALAZINE HYDROCHLORIDE 20 MG/ML
5 INJECTION INTRAMUSCULAR; INTRAVENOUS EVERY 30 MIN PRN
Status: DISCONTINUED | OUTPATIENT
Start: 2025-04-04 | End: 2025-04-04 | Stop reason: HOSPADM

## 2025-04-04 RX ORDER — MIDAZOLAM HYDROCHLORIDE 1 MG/ML
INJECTION, SOLUTION INTRAMUSCULAR; INTRAVENOUS AS NEEDED
Status: DISCONTINUED | OUTPATIENT
Start: 2025-04-04 | End: 2025-04-04

## 2025-04-04 RX ORDER — MEPERIDINE HYDROCHLORIDE 50 MG/ML
12.5 INJECTION INTRAMUSCULAR; INTRAVENOUS; SUBCUTANEOUS EVERY 10 MIN PRN
Status: DISCONTINUED | OUTPATIENT
Start: 2025-04-04 | End: 2025-04-04 | Stop reason: HOSPADM

## 2025-04-04 RX ADMIN — FENTANYL CITRATE 50 MCG: 50 INJECTION, SOLUTION INTRAMUSCULAR; INTRAVENOUS at 15:55

## 2025-04-04 RX ADMIN — HYDROMORPHONE HYDROCHLORIDE 1 MG: 1 INJECTION, SOLUTION INTRAMUSCULAR; INTRAVENOUS; SUBCUTANEOUS at 16:58

## 2025-04-04 RX ADMIN — FENTANYL CITRATE 50 MCG: 50 INJECTION, SOLUTION INTRAMUSCULAR; INTRAVENOUS at 16:29

## 2025-04-04 RX ADMIN — SUGAMMADEX 200 MG: 100 INJECTION, SOLUTION INTRAVENOUS at 16:20

## 2025-04-04 RX ADMIN — MIDAZOLAM 2 MG: 1 INJECTION INTRAMUSCULAR; INTRAVENOUS at 15:47

## 2025-04-04 RX ADMIN — HYDROMORPHONE HYDROCHLORIDE 1 MG: 1 INJECTION, SOLUTION INTRAMUSCULAR; INTRAVENOUS; SUBCUTANEOUS at 16:45

## 2025-04-04 RX ADMIN — LIDOCAINE HYDROCHLORIDE 100 MG: 20 INJECTION, SOLUTION EPIDURAL; INFILTRATION; INTRACAUDAL; PERINEURAL at 15:55

## 2025-04-04 RX ADMIN — DEXAMETHASONE SODIUM PHOSPHATE 4 MG: 4 INJECTION INTRA-ARTICULAR; INTRALESIONAL; INTRAMUSCULAR; INTRAVENOUS; SOFT TISSUE at 16:20

## 2025-04-04 RX ADMIN — OXYCODONE HYDROCHLORIDE AND ACETAMINOPHEN 1 TABLET: 5; 325 TABLET ORAL at 14:15

## 2025-04-04 RX ADMIN — PROPOFOL 200 MG: 10 INJECTION, EMULSION INTRAVENOUS at 15:54

## 2025-04-04 RX ADMIN — ONDANSETRON 4 MG: 2 INJECTION INTRAMUSCULAR; INTRAVENOUS at 16:58

## 2025-04-04 RX ADMIN — CEFAZOLIN SODIUM 2 G: 2 INJECTION, SOLUTION INTRAVENOUS at 16:02

## 2025-04-04 RX ADMIN — ONDANSETRON 4 MG: 2 INJECTION, SOLUTION INTRAMUSCULAR; INTRAVENOUS at 16:20

## 2025-04-04 RX ADMIN — PHENAZOPYRIDINE 100 MG: 100 TABLET ORAL at 17:02

## 2025-04-04 RX ADMIN — ROCURONIUM BROMIDE 50 MG: 10 INJECTION INTRAVENOUS at 15:55

## 2025-04-04 ASSESSMENT — COLUMBIA-SUICIDE SEVERITY RATING SCALE - C-SSRS
6. HAVE YOU EVER DONE ANYTHING, STARTED TO DO ANYTHING, OR PREPARED TO DO ANYTHING TO END YOUR LIFE?: NO
1. IN THE PAST MONTH, HAVE YOU WISHED YOU WERE DEAD OR WISHED YOU COULD GO TO SLEEP AND NOT WAKE UP?: NO
2. HAVE YOU ACTUALLY HAD ANY THOUGHTS OF KILLING YOURSELF?: NO

## 2025-04-04 ASSESSMENT — PAIN SCALES - GENERAL
PAINLEVEL_OUTOF10: 5 - MODERATE PAIN
PAINLEVEL_OUTOF10: 5 - MODERATE PAIN
PAINLEVEL_OUTOF10: 8
PAINLEVEL_OUTOF10: 7
PAINLEVEL_OUTOF10: 3
PAINLEVEL_OUTOF10: 7
PAINLEVEL_OUTOF10: 7
PAINLEVEL_OUTOF10: 5 - MODERATE PAIN

## 2025-04-04 ASSESSMENT — PAIN DESCRIPTION - DESCRIPTORS
DESCRIPTORS: BURNING

## 2025-04-04 ASSESSMENT — PAIN - FUNCTIONAL ASSESSMENT
PAIN_FUNCTIONAL_ASSESSMENT: 0-10

## 2025-04-04 NOTE — OP NOTE
TURBT MEDIUM Operative Note     Date: 2025  OR Location: [facility] OR    Name: Cuca Laguerre, : 1960, Age: 64 y.o., MRN: [id number], Sex: female    Diagnosis  Pre-op Diagnosis      * Urothelial carcinoma [C68.9] Post-op Diagnosis     * Urothelial carcinoma [C68.9]     Procedures  TURBT MEDIUM  05775 - VA CYSTOURETHROSCOPY W/DEST &/RMVL MED BLADDER AMBROSIO      Surgeons      * Alexis Felix - Primary    Resident/Fellow/Other Assistant:  Surgeons and Role:  * No surgeons found with a matching role *    Staff:   Circulator: Winter Che Person: Edmond    Anesthesia Staff: Anesthesiologist: DO FLORIAN Pacheco-AA: ANNA Pike    Procedure Summary  Anesthesia: General  ASA: III  Estimated Blood Loss: 2 mL  Intra-op Medications:   Administrations occurring from 1515 to 1650 on 25:   Medication Name Total Dose   ceFAZolin (Ancef) 2 g in dextrose (iso)  mL 2 g   dexAMETHasone (Decadron) injection 4 mg/mL 4 mg   fentaNYL (Sublimaze) injection 50 mcg/mL 100 mcg   lidocaine PF (Xylocaine-MPF) local injection 2 % 100 mg   midazolam (Versed) injection 1 mg/mL 2 mg   ondansetron (Zofran) 2 mg/mL injection 4 mg   propofol (Diprivan) injection 10 mg/mL 200 mg   rocuronium (ZeMuron) 50 mg/5 mL injection 50 mg   sugammadex (Bridion) 200 mg/2 mL injection 200 mg              Anesthesia Record               Intraprocedure I/O Totals       None           Specimen:   ID Type Source Tests Collected by Time   1 : BLADDER TUMOR Tissue BLADDER TRANSURETHRAL RESECTION SURGICAL PATHOLOGY EXAM Alexis Felix MD 2025 1618      Operative Indications: 64 year old with a history of bladder cancer, recently recurrent seen on office cystoscopy by my partner Dr. Pitt. The patient was counseled regarding the risks, benefits, alternatives, equipment, and personnel involved and consented to proceed with surgical intervention.    Operative Findings:   -3 cm right anterior bladder tumor  - Papillary, narrow base  -  Completely resected  - No concern for CIS or disease elsewhere in the bladder    Operative Procedure:   The patient was correctly identified and the operative plan was confirmed with the patient and the operative team. A weight appropriate dose of prophylactic antibiotics was administered intravenously prior to the procedure and sequential compression devices were applied to the lower extremities and activated prior to induction of anesthesia. The patient was placed in supine position. General anesthesia was induced. The patient was repositioned in dorsal lithotomy position. All pressure points were padded per protocol and the operative area was prepped and draped in the usual sterile fashion.    The 26F rigid resectoscope was inserted with the visual obturator and complete cystourethroscopy was performed revealing the above-mentioned findings. Using the loop electrocautery, a complete resection of all visible lesions was performed down to the deep layers of the muscularis propria.[]    Hemostasis of all resection sites was performed using loop electrocautery.     Counts were correct. Sign-out was performed with the surgical team confirming the above listed specimen. The patient tolerated the procedure well, emerged from anesthesia without incident, and was transferred to PACU in stable condition.     I performed the entire procedure.     MD Alexis Stahl  Phone Number: [phone]

## 2025-04-04 NOTE — ANESTHESIA PROCEDURE NOTES
Airway  Date/Time: 4/4/2025 3:56 PM  Urgency: elective    Airway not difficult    Staffing  Performed: ANNA   Authorized by: Nicholas Quinteros DO    Performed by: ANNA Pike  Patient location during procedure: OR    Indications and Patient Condition  Indications for airway management: anesthesia  Spontaneous Ventilation: absent  Sedation level: deep  Preoxygenated: yes  Patient position: sniffing  MILS maintained throughout  Mask difficulty assessment: 1 - vent by mask  Planned trial extubation    Final Airway Details  Final airway type: endotracheal airway      Successful airway: ETT  Cuffed: yes   Successful intubation technique: direct laryngoscopy  Facilitating devices/methods: intubating stylet  Endotracheal tube insertion site: oral  Blade: He  Blade size: #3  ETT size (mm): 7.0  Cormack-Lehane Classification: grade IIb - view of arytenoids or posterior of glottis only  Placement verified by: chest auscultation   Measured from: lips  ETT to lips (cm): 22  Number of attempts at approach: 1  Ventilation between attempts: supraglottic airway

## 2025-04-04 NOTE — ANESTHESIA PREPROCEDURE EVALUATION
Patient: Cuca Laguerre    Procedure Information       Date/Time: 04/04/25 1515    Procedure: TURBT    Location: [facility] OR 08 / [facility]    Surgeons: Alexis Felix MD            Relevant Problems   Cardiac   (+) Chest pain   (+) Essential hypertension, benign   (+) Hypercholesterolemia   (+) Mixed hyperlipidemia      Neuro   (+) Anxiety   (+) Cubital tunnel syndrome   (+) Depressive disorder   (+) Major depression   (+) Ulnar neuropathy      GI   (+) GI bleed      Endocrine   (+) Class 1 obesity with body mass index (BMI) of 32.0 to 32.9 in adult   (+) Obese   (+) Severe obesity (BMI 35.0-39.9) with comorbidity (Multi)   (+) Type 2 diabetes mellitus without complication, without long-term current use of insulin      Hematology   (+) Anemia   (+) Blood loss anemia   (+) Iron deficiency anemia   (+) Iron deficiency anemia due to dietary causes   (+) Other acquired hemolytic anemias      Musculoskeletal   (+) DDD (degenerative disc disease), lumbar   (+) Fibromyalgia       Clinical information reviewed:                   NPO Detail:  No data recorded     Physical Exam    Airway  Mallampati: II  TM distance: >3 FB  Neck ROM: full     Cardiovascular - normal exam     Dental    Pulmonary - normal exam     Abdominal - normal exam  (+) obese             Anesthesia Plan    History of general anesthesia?: yes  History of complications of general anesthesia?: no    ASA 3     general   (Anemia 2/2 iron deficiency. Pt normally receives iron transfusions but has missed apts due to extenuating family issues. Plan is to give 1 U PRBC in preop as pt has CV history, pts states she is recently light headed at times, and such a precipitous drop in Hgb. )  intravenous induction   Anesthetic plan and risks discussed with patient.  Use of blood products discussed with patient who.    Plan discussed with CRNA.

## 2025-04-04 NOTE — ANESTHESIA POSTPROCEDURE EVALUATION
Patient: Cuca Laguerre    Procedure Summary       Date: 04/04/25 Room / Location: [facility] OR 08 / [facility]    Anesthesia Start: 1547 Anesthesia Stop: 1634    Procedure: TURBT MEDIUM Diagnosis:       Urothelial carcinoma      (Urothelial carcinoma (Multi) [C68.9])    Surgeons: Alexis Felix MD Responsible Provider: Nicholas Quinteros DO    Anesthesia Type: general ASA Status: 3            Anesthesia Type: general    Vitals Value Taken Time   /55 04/04/25 1730   Temp 36 °C (96.8 °F) 04/04/25 1730   Pulse 78 04/04/25 1734   Resp 21 04/04/25 1733   SpO2 98 % 04/04/25 1733   Vitals shown include unfiled device data.    Anesthesia Post Evaluation    Patient location during evaluation: PACU  Patient participation: complete - patient participated  Level of consciousness: awake and alert  Pain management: adequate  Airway patency: patent  Cardiovascular status: acceptable  Respiratory status: acceptable  Hydration status: acceptable  Postoperative Nausea and Vomiting: none        There were no known notable events for this encounter.

## 2025-04-04 NOTE — INTERVAL H&P NOTE
H&P reviewed. The patient was examined and there are no changes to the H&P.        Ellen Caceres MD  PGY-2 Urology [place]  Adult Urology Pager: [phone]  Pediatric Urology Pager: [phone]

## 2025-04-05 LAB
BLOOD EXPIRATION DATE: NORMAL
DISPENSE STATUS: NORMAL
PRODUCT BLOOD TYPE: 600
PRODUCT CODE: NORMAL
UNIT ABO: NORMAL
UNIT NUMBER: NORMAL
UNIT RH: NORMAL
UNIT VOLUME: 350
XM INTEP: NORMAL

## 2025-04-08 LAB
ATRIAL RATE: 62 BPM
P AXIS: -11 DEGREES
P OFFSET: 213 MS
P ONSET: 163 MS
PR INTERVAL: 120 MS
Q ONSET: 223 MS
QRS COUNT: 11 BEATS
QRS DURATION: 74 MS
QT INTERVAL: 378 MS
QTC CALCULATION(BAZETT): 383 MS
QTC FREDERICIA: 382 MS
R AXIS: 55 DEGREES
T AXIS: 46 DEGREES
T OFFSET: 412 MS
VENTRICULAR RATE: 62 BPM

## 2025-04-14 ENCOUNTER — TELEPHONE (OUTPATIENT)
Dept: UROLOGY | Facility: CLINIC | Age: 65
End: 2025-04-14
Payer: MEDICARE

## 2025-04-14 LAB
LABORATORY COMMENT REPORT: NORMAL
PATH REPORT.FINAL DX SPEC: NORMAL
PATH REPORT.GROSS SPEC: NORMAL
PATH REPORT.RELEVANT HX SPEC: NORMAL
PATH REPORT.TOTAL CANCER: NORMAL

## 2025-04-14 NOTE — TELEPHONE ENCOUNTER
Patient calling because she seen the results from her pathology report and is concerned also does not have a follow up yet to discuss.

## 2025-04-18 ENCOUNTER — OFFICE VISIT (OUTPATIENT)
Dept: UROLOGY | Facility: CLINIC | Age: 65
End: 2025-04-18
Payer: MEDICARE

## 2025-04-18 VITALS — SYSTOLIC BLOOD PRESSURE: 111 MMHG | HEART RATE: 72 BPM | DIASTOLIC BLOOD PRESSURE: 55 MMHG

## 2025-04-18 DIAGNOSIS — C68.9 UROTHELIAL CARCINOMA: Primary | ICD-10-CM

## 2025-04-18 DIAGNOSIS — R35.0 URINARY FREQUENCY: ICD-10-CM

## 2025-04-18 PROCEDURE — 99214 OFFICE O/P EST MOD 30 MIN: CPT | Performed by: UROLOGY

## 2025-04-18 PROCEDURE — 4010F ACE/ARB THERAPY RXD/TAKEN: CPT | Performed by: UROLOGY

## 2025-04-18 PROCEDURE — 3078F DIAST BP <80 MM HG: CPT | Performed by: UROLOGY

## 2025-04-18 PROCEDURE — 1036F TOBACCO NON-USER: CPT | Performed by: UROLOGY

## 2025-04-18 PROCEDURE — 3074F SYST BP LT 130 MM HG: CPT | Performed by: UROLOGY

## 2025-04-18 PROCEDURE — G2211 COMPLEX E/M VISIT ADD ON: HCPCS | Performed by: UROLOGY

## 2025-04-18 PROCEDURE — 3044F HG A1C LEVEL LT 7.0%: CPT | Performed by: UROLOGY

## 2025-04-18 RX ORDER — EPINEPHRINE 0.3 MG/.3ML
0.3 INJECTION SUBCUTANEOUS EVERY 5 MIN PRN
OUTPATIENT
Start: 2025-06-18

## 2025-04-18 RX ORDER — DIPHENHYDRAMINE HYDROCHLORIDE 50 MG/ML
50 INJECTION, SOLUTION INTRAMUSCULAR; INTRAVENOUS AS NEEDED
OUTPATIENT
Start: 2025-06-04

## 2025-04-18 RX ORDER — ALBUTEROL SULFATE 0.83 MG/ML
3 SOLUTION RESPIRATORY (INHALATION) AS NEEDED
OUTPATIENT
Start: 2025-06-04

## 2025-04-18 RX ORDER — EPINEPHRINE 0.3 MG/.3ML
0.3 INJECTION SUBCUTANEOUS EVERY 5 MIN PRN
OUTPATIENT
Start: 2025-05-28

## 2025-04-18 RX ORDER — FAMOTIDINE 10 MG/ML
20 INJECTION, SOLUTION INTRAVENOUS ONCE AS NEEDED
OUTPATIENT
Start: 2025-05-21

## 2025-04-18 RX ORDER — FAMOTIDINE 10 MG/ML
20 INJECTION, SOLUTION INTRAVENOUS ONCE AS NEEDED
OUTPATIENT
Start: 2025-06-11

## 2025-04-18 RX ORDER — EPINEPHRINE 0.3 MG/.3ML
0.3 INJECTION SUBCUTANEOUS EVERY 5 MIN PRN
OUTPATIENT
Start: 2025-06-04

## 2025-04-18 RX ORDER — EPINEPHRINE 0.3 MG/.3ML
0.3 INJECTION SUBCUTANEOUS EVERY 5 MIN PRN
OUTPATIENT
Start: 2025-06-11

## 2025-04-18 RX ORDER — ALBUTEROL SULFATE 0.83 MG/ML
3 SOLUTION RESPIRATORY (INHALATION) AS NEEDED
OUTPATIENT
Start: 2025-05-21

## 2025-04-18 RX ORDER — FAMOTIDINE 10 MG/ML
20 INJECTION, SOLUTION INTRAVENOUS ONCE AS NEEDED
OUTPATIENT
Start: 2025-05-14

## 2025-04-18 RX ORDER — EPINEPHRINE 0.3 MG/.3ML
0.3 INJECTION SUBCUTANEOUS EVERY 5 MIN PRN
OUTPATIENT
Start: 2025-05-14

## 2025-04-18 RX ORDER — FAMOTIDINE 10 MG/ML
20 INJECTION, SOLUTION INTRAVENOUS ONCE AS NEEDED
OUTPATIENT
Start: 2025-05-28

## 2025-04-18 RX ORDER — DIPHENHYDRAMINE HYDROCHLORIDE 50 MG/ML
50 INJECTION, SOLUTION INTRAMUSCULAR; INTRAVENOUS AS NEEDED
OUTPATIENT
Start: 2025-06-11

## 2025-04-18 RX ORDER — EPINEPHRINE 0.3 MG/.3ML
0.3 INJECTION SUBCUTANEOUS EVERY 5 MIN PRN
OUTPATIENT
Start: 2025-05-21

## 2025-04-18 RX ORDER — FAMOTIDINE 10 MG/ML
20 INJECTION, SOLUTION INTRAVENOUS ONCE AS NEEDED
OUTPATIENT
Start: 2025-06-04

## 2025-04-18 RX ORDER — ALBUTEROL SULFATE 0.83 MG/ML
3 SOLUTION RESPIRATORY (INHALATION) AS NEEDED
OUTPATIENT
Start: 2025-05-14

## 2025-04-18 RX ORDER — DIPHENHYDRAMINE HYDROCHLORIDE 50 MG/ML
50 INJECTION, SOLUTION INTRAMUSCULAR; INTRAVENOUS AS NEEDED
OUTPATIENT
Start: 2025-05-21

## 2025-04-18 RX ORDER — ALBUTEROL SULFATE 0.83 MG/ML
3 SOLUTION RESPIRATORY (INHALATION) AS NEEDED
OUTPATIENT
Start: 2025-05-28

## 2025-04-18 RX ORDER — DIPHENHYDRAMINE HYDROCHLORIDE 50 MG/ML
50 INJECTION, SOLUTION INTRAMUSCULAR; INTRAVENOUS AS NEEDED
OUTPATIENT
Start: 2025-06-18

## 2025-04-18 RX ORDER — METHOCARBAMOL 500 MG/1
1 TABLET, FILM COATED ORAL AS NEEDED
COMMUNITY
Start: 2025-03-30

## 2025-04-18 RX ORDER — DIPHENHYDRAMINE HYDROCHLORIDE 50 MG/ML
50 INJECTION, SOLUTION INTRAMUSCULAR; INTRAVENOUS AS NEEDED
OUTPATIENT
Start: 2025-05-14

## 2025-04-18 RX ORDER — ALBUTEROL SULFATE 0.83 MG/ML
3 SOLUTION RESPIRATORY (INHALATION) AS NEEDED
OUTPATIENT
Start: 2025-06-18

## 2025-04-18 RX ORDER — DIPHENHYDRAMINE HYDROCHLORIDE 50 MG/ML
50 INJECTION, SOLUTION INTRAMUSCULAR; INTRAVENOUS AS NEEDED
OUTPATIENT
Start: 2025-05-28

## 2025-04-18 RX ORDER — ALBUTEROL SULFATE 0.83 MG/ML
3 SOLUTION RESPIRATORY (INHALATION) AS NEEDED
OUTPATIENT
Start: 2025-06-11

## 2025-04-18 RX ORDER — FAMOTIDINE 10 MG/ML
20 INJECTION, SOLUTION INTRAVENOUS ONCE AS NEEDED
OUTPATIENT
Start: 2025-06-18

## 2025-04-18 NOTE — PROGRESS NOTES
PAST UROLOGICAL HISTORY:  64-year-old woman with history of bladder cancer. Initially diagnosed with papillary urothelial neoplasm of low malignant potential in July 2022 under Dr. Adames's care. She received a 6-week course of BCG therapy in summer 2022. First recurrence was noted in March 2024 with papillary urothelial neoplasm of low malignant potential. She was previously followed by Dr. Pitt.    I independently reviewed and interpreted prior notes, pathology reports, and surgical records as noted herein.    HPI TODAY 04/18/2025:    Bladder Cancer:  - Underwent repeat TURBT with me in April 2025 which showed a 3 cm right anterior bladder tumor that was papillary on a narrow base.  - Pathology revealed high-grade urothelial carcinoma, Ta (non-invasive).  - Tumor was completely resected with no evidence of disease elsewhere and negative random biopsies.  - Ms. Laguerre reports anxiety about progression from previous low-grade to current high-grade disease.  - She is concerned about having two recurrences in a relatively short period of time.    Overactive Bladder:  - Reports incomplete bladder emptying with need to urinate multiple times in succession (typically three times with 30-second intervals).  - Previously tried oxybutynin prescribed by Dr. Pitt without significant improvement.  - Experiences nocturia 2-3 times per night, disrupting sleep.  - Denies nocturnal incontinence.  - Reports significant bladder spasms following recent TURBT, some of which are ongoing.    PSA History:  - Not applicable for female patient.    Testosterone History:  - Not applicable for female patient.    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  - Currently on disability.    SOCIAL:  - [place] [facility].    REVIEW OF SYSTEMS: A tailored review of systems was performed and all pertinent positives and negatives are listed in the HPI.     PHYSICAL  EXAMINATION:  Gen: NAD  Pulm: No increased WOB on RA  Cards: WWP    ASSESSMENT/PLAN:    Bladder Cancer, High-Grade Non-Invasive (Ta) (C67.9)  - Assessment: Intermediate risk NMIBC (TaHG <3cm)  - Plan:    - Will initiate BCG therapy in mid-May 2025 (approximately 6 weeks post-TURBT)    - Complete 6-week induction course    - Follow with maintenance BCG for 1 years x SWOG protocol    - Cystoscopy surveillance 6 weeks after completing induction BCG    - Discussed alternative options including gemcitabine if BCG fails  - Counseling: Risks, benefits, and alternatives were discussed including but not limited to increased urinary frequency, urgency, dysuria, hematuria, UTI, and rare risk of BCG sepsis. Discussed importance of holding medication in bladder for at least 1 hour (ideally 2 hours), dehydrating before treatment, and using bleach in toilet after voiding. Prognosis is favorable with vigilant monitoring, though progression remains a concern requiring close surveillance.    Overactive Bladder (N32.81)  - Assessment: Symptoms of frequency, urgency, and incomplete emptying. Previous trial of oxybutynin was ineffective.  - Plan:    - Provided sample of Gemtessa (vibegron)    - Discussed potential side effects including mild blood pressure elevation    - Advised on behavioral modifications: limit fluids 2 hours before bedtime, avoid evening alcohol, avoid caffeine after noon, double-void before bed    - Discussed potential cost issues and option of [place] [facility] for more affordable alternative if needed ($ for 3-month supply)  - Counseling: Discussed that BCG treatments will likely temporarily worsen urinary symptoms, particularly during later weeks of treatment.

## 2025-04-27 ENCOUNTER — APPOINTMENT (OUTPATIENT)
Dept: CARDIOLOGY | Facility: HOSPITAL | Age: 65
End: 2025-04-27
Payer: MEDICARE

## 2025-04-27 ENCOUNTER — APPOINTMENT (OUTPATIENT)
Dept: RADIOLOGY | Facility: HOSPITAL | Age: 65
End: 2025-04-27
Payer: MEDICARE

## 2025-04-27 ENCOUNTER — HOSPITAL ENCOUNTER (INPATIENT)
Facility: HOSPITAL | Age: 65
LOS: 4 days | Discharge: HOME | End: 2025-05-01
Attending: EMERGENCY MEDICINE | Admitting: STUDENT IN AN ORGANIZED HEALTH CARE EDUCATION/TRAINING PROGRAM
Payer: MEDICARE

## 2025-04-27 DIAGNOSIS — K92.1 GASTROINTESTINAL HEMORRHAGE WITH MELENA: ICD-10-CM

## 2025-04-27 DIAGNOSIS — K55.20 AVM (ARTERIOVENOUS MALFORMATION) OF SMALL BOWEL, ACQUIRED: ICD-10-CM

## 2025-04-27 DIAGNOSIS — K92.2 GASTROINTESTINAL HEMORRHAGE, UNSPECIFIED GASTROINTESTINAL HEMORRHAGE TYPE: ICD-10-CM

## 2025-04-27 DIAGNOSIS — R60.0 LOWER EXTREMITY EDEMA: ICD-10-CM

## 2025-04-27 DIAGNOSIS — D62 ACUTE BLOOD LOSS ANEMIA: ICD-10-CM

## 2025-04-27 DIAGNOSIS — K92.2 UPPER GI HEMORRHAGE: Primary | ICD-10-CM

## 2025-04-27 PROBLEM — R65.10 SIRS (SYSTEMIC INFLAMMATORY RESPONSE SYNDROME) (MULTI): Status: ACTIVE | Noted: 2025-04-27

## 2025-04-27 PROBLEM — Z87.74 HISTORY OF ARTERIOVENOUS MALFORMATION: Status: ACTIVE | Noted: 2025-04-27

## 2025-04-27 LAB
ABO GROUP (TYPE) IN BLOOD: NORMAL
ALBUMIN SERPL BCP-MCNC: 4.2 G/DL (ref 3.4–5)
ALP SERPL-CCNC: 72 U/L (ref 33–136)
ALT SERPL W P-5'-P-CCNC: 18 U/L (ref 7–45)
ANION GAP BLDV CALCULATED.4IONS-SCNC: 10 MMOL/L (ref 10–25)
ANION GAP SERPL CALC-SCNC: 13 MMOL/L (ref 10–20)
ANTIBODY SCREEN: NORMAL
APTT PPP: 25 SECONDS (ref 26–36)
AST SERPL W P-5'-P-CCNC: 16 U/L (ref 9–39)
BASE EXCESS BLDV CALC-SCNC: -5.6 MMOL/L (ref -2–3)
BASOPHILS # BLD AUTO: 0.07 X10*3/UL (ref 0–0.1)
BASOPHILS NFR BLD AUTO: 0.5 %
BILIRUB SERPL-MCNC: 0.2 MG/DL (ref 0–1.2)
BNP SERPL-MCNC: 23 PG/ML (ref 0–99)
BODY TEMPERATURE: ABNORMAL
BUN SERPL-MCNC: 29 MG/DL (ref 6–23)
BURR CELLS BLD QL SMEAR: NORMAL
CA-I BLDV-SCNC: 1.18 MMOL/L (ref 1.1–1.33)
CALCIUM SERPL-MCNC: 9.1 MG/DL (ref 8.6–10.3)
CARDIAC TROPONIN I PNL SERPL HS: 4 NG/L (ref 0–13)
CARDIAC TROPONIN I PNL SERPL HS: 4 NG/L (ref 0–13)
CHLORIDE BLDV-SCNC: 110 MMOL/L (ref 98–107)
CHLORIDE SERPL-SCNC: 107 MMOL/L (ref 98–107)
CO2 SERPL-SCNC: 18 MMOL/L (ref 21–32)
CREAT SERPL-MCNC: 0.84 MG/DL (ref 0.5–1.05)
EGFRCR SERPLBLD CKD-EPI 2021: 78 ML/MIN/1.73M*2
EOSINOPHIL # BLD AUTO: 0.09 X10*3/UL (ref 0–0.7)
EOSINOPHIL NFR BLD AUTO: 0.7 %
ERYTHROCYTE [DISTWIDTH] IN BLOOD BY AUTOMATED COUNT: 21.3 % (ref 11.5–14.5)
GLUCOSE BLDV-MCNC: 202 MG/DL (ref 74–99)
GLUCOSE SERPL-MCNC: 216 MG/DL (ref 74–99)
HCO3 BLDV-SCNC: 18.4 MMOL/L (ref 22–26)
HCT VFR BLD AUTO: 26.5 % (ref 36–46)
HCT VFR BLD EST: 22 % (ref 36–46)
HGB BLD-MCNC: 7.9 G/DL (ref 12–16)
HGB BLDV-MCNC: 7.4 G/DL (ref 12–16)
IMM GRANULOCYTES # BLD AUTO: 0.05 X10*3/UL (ref 0–0.7)
IMM GRANULOCYTES NFR BLD AUTO: 0.4 % (ref 0–0.9)
INHALED O2 CONCENTRATION: 28 %
INR PPP: 1 (ref 0.9–1.1)
LACTATE BLDV-SCNC: 0.8 MMOL/L (ref 0.4–2)
LACTATE SERPL-SCNC: 0.8 MMOL/L (ref 0.4–2)
LIPASE SERPL-CCNC: 143 U/L (ref 9–82)
LYMPHOCYTES # BLD AUTO: 2.2 X10*3/UL (ref 1.2–4.8)
LYMPHOCYTES NFR BLD AUTO: 16.6 %
MAGNESIUM SERPL-MCNC: 2.1 MG/DL (ref 1.6–2.4)
MCH RBC QN AUTO: 22.1 PG (ref 26–34)
MCHC RBC AUTO-ENTMCNC: 29.8 G/DL (ref 32–36)
MCV RBC AUTO: 74 FL (ref 80–100)
MONOCYTES # BLD AUTO: 0.87 X10*3/UL (ref 0.1–1)
MONOCYTES NFR BLD AUTO: 6.6 %
NEUTROPHILS # BLD AUTO: 9.99 X10*3/UL (ref 1.2–7.7)
NEUTROPHILS NFR BLD AUTO: 75.2 %
NRBC BLD-RTO: 0 /100 WBCS (ref 0–0)
OVALOCYTES BLD QL SMEAR: NORMAL
OXYHGB MFR BLDV: 90.7 % (ref 45–75)
PCO2 BLDV: 29 MM HG (ref 41–51)
PH BLDV: 7.41 PH (ref 7.33–7.43)
PLATELET # BLD AUTO: 370 X10*3/UL (ref 150–450)
PO2 BLDV: 61 MM HG (ref 35–45)
POLYCHROMASIA BLD QL SMEAR: NORMAL
POTASSIUM BLDV-SCNC: 3.6 MMOL/L (ref 3.5–5.3)
POTASSIUM SERPL-SCNC: 3.7 MMOL/L (ref 3.5–5.3)
PROT SERPL-MCNC: 6.9 G/DL (ref 6.4–8.2)
PROTHROMBIN TIME: 10.7 SECONDS (ref 9.8–12.4)
RBC # BLD AUTO: 3.58 X10*6/UL (ref 4–5.2)
RBC MORPH BLD: NORMAL
RH FACTOR (ANTIGEN D): NORMAL
SAO2 % BLDV: 94 % (ref 45–75)
SODIUM BLDV-SCNC: 135 MMOL/L (ref 136–145)
SODIUM SERPL-SCNC: 134 MMOL/L (ref 136–145)
WBC # BLD AUTO: 13.3 X10*3/UL (ref 4.4–11.3)

## 2025-04-27 PROCEDURE — 83540 ASSAY OF IRON: CPT | Performed by: STUDENT IN AN ORGANIZED HEALTH CARE EDUCATION/TRAINING PROGRAM

## 2025-04-27 PROCEDURE — 71250 CT THORAX DX C-: CPT

## 2025-04-27 PROCEDURE — 71045 X-RAY EXAM CHEST 1 VIEW: CPT

## 2025-04-27 PROCEDURE — 83735 ASSAY OF MAGNESIUM: CPT | Performed by: PHYSICIAN ASSISTANT

## 2025-04-27 PROCEDURE — 84484 ASSAY OF TROPONIN QUANT: CPT | Performed by: PHYSICIAN ASSISTANT

## 2025-04-27 PROCEDURE — 82728 ASSAY OF FERRITIN: CPT | Performed by: STUDENT IN AN ORGANIZED HEALTH CARE EDUCATION/TRAINING PROGRAM

## 2025-04-27 PROCEDURE — 1210000001 HC SEMI-PRIVATE ROOM DAILY

## 2025-04-27 PROCEDURE — 86923 COMPATIBILITY TEST ELECTRIC: CPT

## 2025-04-27 PROCEDURE — 36415 COLL VENOUS BLD VENIPUNCTURE: CPT | Performed by: PHYSICIAN ASSISTANT

## 2025-04-27 PROCEDURE — 2500000004 HC RX 250 GENERAL PHARMACY W/ HCPCS (ALT 636 FOR OP/ED): Mod: JZ | Performed by: PHYSICIAN ASSISTANT

## 2025-04-27 PROCEDURE — 74176 CT ABD & PELVIS W/O CONTRAST: CPT

## 2025-04-27 PROCEDURE — 83690 ASSAY OF LIPASE: CPT | Performed by: PHYSICIAN ASSISTANT

## 2025-04-27 PROCEDURE — 85730 THROMBOPLASTIN TIME PARTIAL: CPT | Performed by: PHYSICIAN ASSISTANT

## 2025-04-27 PROCEDURE — 87040 BLOOD CULTURE FOR BACTERIA: CPT | Mod: ELYLAB | Performed by: PHYSICIAN ASSISTANT

## 2025-04-27 PROCEDURE — 83550 IRON BINDING TEST: CPT | Performed by: STUDENT IN AN ORGANIZED HEALTH CARE EDUCATION/TRAINING PROGRAM

## 2025-04-27 PROCEDURE — 99285 EMERGENCY DEPT VISIT HI MDM: CPT | Mod: 25 | Performed by: EMERGENCY MEDICINE

## 2025-04-27 PROCEDURE — 83605 ASSAY OF LACTIC ACID: CPT | Performed by: PHYSICIAN ASSISTANT

## 2025-04-27 PROCEDURE — 83880 ASSAY OF NATRIURETIC PEPTIDE: CPT | Performed by: PHYSICIAN ASSISTANT

## 2025-04-27 PROCEDURE — 96375 TX/PRO/DX INJ NEW DRUG ADDON: CPT

## 2025-04-27 PROCEDURE — 82435 ASSAY OF BLOOD CHLORIDE: CPT | Performed by: PHYSICIAN ASSISTANT

## 2025-04-27 PROCEDURE — 99223 1ST HOSP IP/OBS HIGH 75: CPT | Performed by: STUDENT IN AN ORGANIZED HEALTH CARE EDUCATION/TRAINING PROGRAM

## 2025-04-27 PROCEDURE — 96361 HYDRATE IV INFUSION ADD-ON: CPT

## 2025-04-27 PROCEDURE — 85025 COMPLETE CBC W/AUTO DIFF WBC: CPT | Performed by: PHYSICIAN ASSISTANT

## 2025-04-27 PROCEDURE — 71045 X-RAY EXAM CHEST 1 VIEW: CPT | Performed by: STUDENT IN AN ORGANIZED HEALTH CARE EDUCATION/TRAINING PROGRAM

## 2025-04-27 PROCEDURE — 96365 THER/PROPH/DIAG IV INF INIT: CPT

## 2025-04-27 PROCEDURE — 85610 PROTHROMBIN TIME: CPT | Performed by: PHYSICIAN ASSISTANT

## 2025-04-27 PROCEDURE — 86901 BLOOD TYPING SEROLOGIC RH(D): CPT | Performed by: PHYSICIAN ASSISTANT

## 2025-04-27 PROCEDURE — 84132 ASSAY OF SERUM POTASSIUM: CPT | Performed by: PHYSICIAN ASSISTANT

## 2025-04-27 PROCEDURE — 74176 CT ABD & PELVIS W/O CONTRAST: CPT | Performed by: RADIOLOGY

## 2025-04-27 PROCEDURE — 93005 ELECTROCARDIOGRAM TRACING: CPT

## 2025-04-27 PROCEDURE — 71250 CT THORAX DX C-: CPT | Performed by: RADIOLOGY

## 2025-04-27 RX ORDER — MORPHINE SULFATE 4 MG/ML
4 INJECTION, SOLUTION INTRAMUSCULAR; INTRAVENOUS ONCE
Status: COMPLETED | OUTPATIENT
Start: 2025-04-27 | End: 2025-04-27

## 2025-04-27 RX ORDER — VANCOMYCIN 2 GRAM/500 ML IN 0.9 % SODIUM CHLORIDE INTRAVENOUS
2 ONCE
Status: COMPLETED | OUTPATIENT
Start: 2025-04-27 | End: 2025-04-28

## 2025-04-27 RX ORDER — PANTOPRAZOLE SODIUM 40 MG/10ML
80 INJECTION, POWDER, LYOPHILIZED, FOR SOLUTION INTRAVENOUS ONCE
Status: COMPLETED | OUTPATIENT
Start: 2025-04-27 | End: 2025-04-27

## 2025-04-27 RX ADMIN — PANTOPRAZOLE SODIUM 80 MG: 40 INJECTION, POWDER, FOR SOLUTION INTRAVENOUS at 20:29

## 2025-04-27 RX ADMIN — PIPERACILLIN AND TAZOBACTAM 4.5 G: 4; .5 INJECTION, POWDER, FOR SOLUTION INTRAVENOUS at 22:44

## 2025-04-27 RX ADMIN — Medication 2 G: at 23:38

## 2025-04-27 RX ADMIN — MORPHINE SULFATE 4 MG: 4 INJECTION, SOLUTION INTRAMUSCULAR; INTRAVENOUS at 20:48

## 2025-04-27 RX ADMIN — SODIUM CHLORIDE 1000 ML: 0.9 INJECTION, SOLUTION INTRAVENOUS at 20:29

## 2025-04-27 RX ADMIN — HYDROMORPHONE HYDROCHLORIDE 0.5 MG: 1 INJECTION, SOLUTION INTRAMUSCULAR; INTRAVENOUS; SUBCUTANEOUS at 23:36

## 2025-04-27 ASSESSMENT — PAIN DESCRIPTION - LOCATION
LOCATION: CHEST
LOCATION: CHEST

## 2025-04-27 ASSESSMENT — PAIN - FUNCTIONAL ASSESSMENT
PAIN_FUNCTIONAL_ASSESSMENT: 0-10
PAIN_FUNCTIONAL_ASSESSMENT: 0-10

## 2025-04-27 ASSESSMENT — PAIN DESCRIPTION - DESCRIPTORS: DESCRIPTORS: HEAVINESS

## 2025-04-27 ASSESSMENT — PAIN SCALES - GENERAL
PAINLEVEL_OUTOF10: 8
PAINLEVEL_OUTOF10: 6
PAINLEVEL_OUTOF10: 8
PAINLEVEL_OUTOF10: 7

## 2025-04-27 ASSESSMENT — PAIN DESCRIPTION - ORIENTATION: ORIENTATION: MID

## 2025-04-27 ASSESSMENT — PAIN DESCRIPTION - FREQUENCY: FREQUENCY: CONSTANT/CONTINUOUS

## 2025-04-27 ASSESSMENT — PAIN DESCRIPTION - PAIN TYPE: TYPE: ACUTE PAIN

## 2025-04-27 NOTE — Clinical Note
Patient tolerated procedure well. Appears comfortable with no complaints of pain. VS stable. Arousable prior to transport. Patient transported to [facility] via cart.  Report called per CRNA.  Handoff completed.

## 2025-04-27 NOTE — Clinical Note
Huddle and Timeout completed together with team. Patient wristband and PENG information verified.  Anesthesia safety check completed. Patient was oriented and participated.

## 2025-04-28 ENCOUNTER — APPOINTMENT (OUTPATIENT)
Dept: GASTROENTEROLOGY | Facility: HOSPITAL | Age: 65
End: 2025-04-28
Payer: MEDICARE

## 2025-04-28 ENCOUNTER — ANESTHESIA EVENT (OUTPATIENT)
Dept: GASTROENTEROLOGY | Facility: HOSPITAL | Age: 65
End: 2025-04-28
Payer: MEDICARE

## 2025-04-28 ENCOUNTER — ANESTHESIA (OUTPATIENT)
Dept: GASTROENTEROLOGY | Facility: HOSPITAL | Age: 65
End: 2025-04-28
Payer: MEDICARE

## 2025-04-28 DIAGNOSIS — K92.2 UPPER GI HEMORRHAGE: ICD-10-CM

## 2025-04-28 DIAGNOSIS — K92.1 GASTROINTESTINAL HEMORRHAGE WITH MELENA: ICD-10-CM

## 2025-04-28 DIAGNOSIS — K92.2 GASTROINTESTINAL HEMORRHAGE, UNSPECIFIED GASTROINTESTINAL HEMORRHAGE TYPE: ICD-10-CM

## 2025-04-28 LAB
ANION GAP SERPL CALC-SCNC: 9 MMOL/L (ref 10–20)
ATRIAL RATE: 118 BPM
BUN SERPL-MCNC: 20 MG/DL (ref 6–23)
CALCIUM SERPL-MCNC: 8.4 MG/DL (ref 8.6–10.3)
CHLORIDE SERPL-SCNC: 113 MMOL/L (ref 98–107)
CO2 SERPL-SCNC: 18 MMOL/L (ref 21–32)
CREAT SERPL-MCNC: 0.55 MG/DL (ref 0.5–1.05)
EGFRCR SERPLBLD CKD-EPI 2021: >90 ML/MIN/1.73M*2
ERYTHROCYTE [DISTWIDTH] IN BLOOD BY AUTOMATED COUNT: 21 % (ref 11.5–14.5)
FERRITIN SERPL-MCNC: 10 NG/ML (ref 8–150)
GLUCOSE BLD MANUAL STRIP-MCNC: 102 MG/DL (ref 74–99)
GLUCOSE BLD MANUAL STRIP-MCNC: 109 MG/DL (ref 74–99)
GLUCOSE BLD MANUAL STRIP-MCNC: 128 MG/DL (ref 74–99)
GLUCOSE BLD MANUAL STRIP-MCNC: 200 MG/DL (ref 74–99)
GLUCOSE SERPL-MCNC: 127 MG/DL (ref 74–99)
HCT VFR BLD AUTO: 21.9 % (ref 36–46)
HCT VFR BLD AUTO: 25.7 % (ref 36–46)
HGB BLD-MCNC: 6.5 G/DL (ref 12–16)
HGB BLD-MCNC: 7.9 G/DL (ref 12–16)
IRON SATN MFR SERPL: 7 % (ref 25–45)
IRON SERPL-MCNC: 27 UG/DL (ref 35–150)
MCH RBC QN AUTO: 21.8 PG (ref 26–34)
MCHC RBC AUTO-ENTMCNC: 29.7 G/DL (ref 32–36)
MCV RBC AUTO: 74 FL (ref 80–100)
NRBC BLD-RTO: 0 /100 WBCS (ref 0–0)
P AXIS: 37 DEGREES
P OFFSET: 220 MS
P ONSET: 171 MS
PLATELET # BLD AUTO: 289 X10*3/UL (ref 150–450)
POTASSIUM SERPL-SCNC: 3.8 MMOL/L (ref 3.5–5.3)
PR INTERVAL: 112 MS
Q ONSET: 227 MS
QRS COUNT: 20 BEATS
QRS DURATION: 66 MS
QT INTERVAL: 300 MS
QTC CALCULATION(BAZETT): 420 MS
QTC FREDERICIA: 376 MS
R AXIS: 54 DEGREES
RBC # BLD AUTO: 2.98 X10*6/UL (ref 4–5.2)
SODIUM SERPL-SCNC: 136 MMOL/L (ref 136–145)
T AXIS: 72 DEGREES
T OFFSET: 377 MS
TIBC SERPL-MCNC: 403 UG/DL (ref 240–445)
UIBC SERPL-MCNC: 376 UG/DL (ref 110–370)
VENTRICULAR RATE: 118 BPM
WBC # BLD AUTO: 9.8 X10*3/UL (ref 4.4–11.3)

## 2025-04-28 PROCEDURE — 80048 BASIC METABOLIC PNL TOTAL CA: CPT | Performed by: STUDENT IN AN ORGANIZED HEALTH CARE EDUCATION/TRAINING PROGRAM

## 2025-04-28 PROCEDURE — 2500000004 HC RX 250 GENERAL PHARMACY W/ HCPCS (ALT 636 FOR OP/ED): Performed by: NURSE ANESTHETIST, CERTIFIED REGISTERED

## 2025-04-28 PROCEDURE — 0W3P8ZZ CONTROL BLEEDING IN GASTROINTESTINAL TRACT, VIA NATURAL OR ARTIFICIAL OPENING ENDOSCOPIC: ICD-10-PCS | Performed by: STUDENT IN AN ORGANIZED HEALTH CARE EDUCATION/TRAINING PROGRAM

## 2025-04-28 PROCEDURE — 82947 ASSAY GLUCOSE BLOOD QUANT: CPT

## 2025-04-28 PROCEDURE — 2500000001 HC RX 250 WO HCPCS SELF ADMINISTERED DRUGS (ALT 637 FOR MEDICARE OP): Performed by: STUDENT IN AN ORGANIZED HEALTH CARE EDUCATION/TRAINING PROGRAM

## 2025-04-28 PROCEDURE — 2720000007 HC OR 272 NO HCPCS

## 2025-04-28 PROCEDURE — 1200000002 HC GENERAL ROOM WITH TELEMETRY DAILY

## 2025-04-28 PROCEDURE — 99222 1ST HOSP IP/OBS MODERATE 55: CPT | Performed by: NURSE PRACTITIONER

## 2025-04-28 PROCEDURE — P9040 RBC LEUKOREDUCED IRRADIATED: HCPCS

## 2025-04-28 PROCEDURE — 44361 SMALL BOWEL ENDOSCOPY/BIOPSY: CPT | Performed by: STUDENT IN AN ORGANIZED HEALTH CARE EDUCATION/TRAINING PROGRAM

## 2025-04-28 PROCEDURE — 36430 TRANSFUSION BLD/BLD COMPNT: CPT

## 2025-04-28 PROCEDURE — 36415 COLL VENOUS BLD VENIPUNCTURE: CPT | Performed by: STUDENT IN AN ORGANIZED HEALTH CARE EDUCATION/TRAINING PROGRAM

## 2025-04-28 PROCEDURE — 3700000002 HC GENERAL ANESTHESIA TIME - EACH INCREMENTAL 1 MINUTE

## 2025-04-28 PROCEDURE — 2500000002 HC RX 250 W HCPCS SELF ADMINISTERED DRUGS (ALT 637 FOR MEDICARE OP, ALT 636 FOR OP/ED): Performed by: STUDENT IN AN ORGANIZED HEALTH CARE EDUCATION/TRAINING PROGRAM

## 2025-04-28 PROCEDURE — 99232 SBSQ HOSP IP/OBS MODERATE 35: CPT | Performed by: STUDENT IN AN ORGANIZED HEALTH CARE EDUCATION/TRAINING PROGRAM

## 2025-04-28 PROCEDURE — 3700000001 HC GENERAL ANESTHESIA TIME - INITIAL BASE CHARGE

## 2025-04-28 PROCEDURE — 2500000004 HC RX 250 GENERAL PHARMACY W/ HCPCS (ALT 636 FOR OP/ED): Mod: JZ | Performed by: STUDENT IN AN ORGANIZED HEALTH CARE EDUCATION/TRAINING PROGRAM

## 2025-04-28 PROCEDURE — 2500000004 HC RX 250 GENERAL PHARMACY W/ HCPCS (ALT 636 FOR OP/ED): Performed by: STUDENT IN AN ORGANIZED HEALTH CARE EDUCATION/TRAINING PROGRAM

## 2025-04-28 PROCEDURE — 85027 COMPLETE CBC AUTOMATED: CPT | Performed by: STUDENT IN AN ORGANIZED HEALTH CARE EDUCATION/TRAINING PROGRAM

## 2025-04-28 PROCEDURE — 85014 HEMATOCRIT: CPT | Performed by: STUDENT IN AN ORGANIZED HEALTH CARE EDUCATION/TRAINING PROGRAM

## 2025-04-28 PROCEDURE — 0DB88ZX EXCISION OF SMALL INTESTINE, VIA NATURAL OR ARTIFICIAL OPENING ENDOSCOPIC, DIAGNOSTIC: ICD-10-PCS | Performed by: STUDENT IN AN ORGANIZED HEALTH CARE EDUCATION/TRAINING PROGRAM

## 2025-04-28 RX ORDER — POLYETHYLENE GLYCOL 3350 17 G/17G
17 POWDER, FOR SOLUTION ORAL DAILY PRN
Status: DISCONTINUED | OUTPATIENT
Start: 2025-04-28 | End: 2025-05-01 | Stop reason: HOSPADM

## 2025-04-28 RX ORDER — PROPOFOL 10 MG/ML
INJECTION, EMULSION INTRAVENOUS AS NEEDED
Status: DISCONTINUED | OUTPATIENT
Start: 2025-04-28 | End: 2025-04-28

## 2025-04-28 RX ORDER — ONDANSETRON HYDROCHLORIDE 2 MG/ML
4 INJECTION, SOLUTION INTRAVENOUS EVERY 8 HOURS PRN
Status: DISCONTINUED | OUTPATIENT
Start: 2025-04-28 | End: 2025-05-01 | Stop reason: HOSPADM

## 2025-04-28 RX ORDER — TALC
3 POWDER (GRAM) TOPICAL NIGHTLY PRN
Status: DISCONTINUED | OUTPATIENT
Start: 2025-04-28 | End: 2025-05-01 | Stop reason: HOSPADM

## 2025-04-28 RX ORDER — ACETAMINOPHEN 160 MG/5ML
650 SOLUTION ORAL EVERY 4 HOURS PRN
Status: DISCONTINUED | OUTPATIENT
Start: 2025-04-28 | End: 2025-05-01 | Stop reason: HOSPADM

## 2025-04-28 RX ORDER — EZETIMIBE 10 MG/1
10 TABLET ORAL DAILY
Status: DISCONTINUED | OUTPATIENT
Start: 2025-04-28 | End: 2025-05-01 | Stop reason: HOSPADM

## 2025-04-28 RX ORDER — HYDROMORPHONE HYDROCHLORIDE 1 MG/ML
0.6 INJECTION, SOLUTION INTRAMUSCULAR; INTRAVENOUS; SUBCUTANEOUS EVERY 4 HOURS PRN
Status: DISCONTINUED | OUTPATIENT
Start: 2025-04-28 | End: 2025-05-01 | Stop reason: HOSPADM

## 2025-04-28 RX ORDER — SODIUM CHLORIDE, SODIUM LACTATE, POTASSIUM CHLORIDE, CALCIUM CHLORIDE 600; 310; 30; 20 MG/100ML; MG/100ML; MG/100ML; MG/100ML
75 INJECTION, SOLUTION INTRAVENOUS CONTINUOUS
Status: DISCONTINUED | OUTPATIENT
Start: 2025-04-28 | End: 2025-04-29

## 2025-04-28 RX ORDER — ONDANSETRON 4 MG/1
4 TABLET, FILM COATED ORAL EVERY 8 HOURS PRN
Status: DISCONTINUED | OUTPATIENT
Start: 2025-04-28 | End: 2025-05-01 | Stop reason: HOSPADM

## 2025-04-28 RX ORDER — ACETAMINOPHEN 650 MG/1
650 SUPPOSITORY RECTAL EVERY 4 HOURS PRN
Status: DISCONTINUED | OUTPATIENT
Start: 2025-04-28 | End: 2025-05-01 | Stop reason: HOSPADM

## 2025-04-28 RX ORDER — ATORVASTATIN CALCIUM 80 MG/1
80 TABLET, FILM COATED ORAL NIGHTLY
Status: DISCONTINUED | OUTPATIENT
Start: 2025-04-28 | End: 2025-05-01 | Stop reason: HOSPADM

## 2025-04-28 RX ORDER — CLOPIDOGREL BISULFATE 75 MG/1
75 TABLET ORAL DAILY
Status: DISCONTINUED | OUTPATIENT
Start: 2025-04-28 | End: 2025-04-29

## 2025-04-28 RX ORDER — ACETAMINOPHEN 325 MG/1
650 TABLET ORAL EVERY 4 HOURS PRN
Status: DISCONTINUED | OUTPATIENT
Start: 2025-04-28 | End: 2025-05-01 | Stop reason: HOSPADM

## 2025-04-28 RX ORDER — PANTOPRAZOLE SODIUM 40 MG/10ML
40 INJECTION, POWDER, LYOPHILIZED, FOR SOLUTION INTRAVENOUS 2 TIMES DAILY
Status: DISCONTINUED | OUTPATIENT
Start: 2025-04-28 | End: 2025-04-30

## 2025-04-28 RX ADMIN — PROPOFOL 50 MG: 10 INJECTION, EMULSION INTRAVENOUS at 14:10

## 2025-04-28 RX ADMIN — PROPOFOL 150 MG: 10 INJECTION, EMULSION INTRAVENOUS at 13:54

## 2025-04-28 RX ADMIN — PROPOFOL 40 MG: 10 INJECTION, EMULSION INTRAVENOUS at 14:19

## 2025-04-28 RX ADMIN — PROPOFOL 50 MG: 10 INJECTION, EMULSION INTRAVENOUS at 13:57

## 2025-04-28 RX ADMIN — PROPOFOL 50 MG: 10 INJECTION, EMULSION INTRAVENOUS at 13:58

## 2025-04-28 RX ADMIN — SODIUM CHLORIDE, SODIUM LACTATE, POTASSIUM CHLORIDE, AND CALCIUM CHLORIDE 75 ML/HR: .6; .31; .03; .02 INJECTION, SOLUTION INTRAVENOUS at 12:13

## 2025-04-28 RX ADMIN — EZETIMIBE 10 MG: 10 TABLET ORAL at 08:15

## 2025-04-28 RX ADMIN — ATORVASTATIN CALCIUM 80 MG: 80 TABLET, FILM COATED ORAL at 20:12

## 2025-04-28 RX ADMIN — PROPOFOL 30 MG: 10 INJECTION, EMULSION INTRAVENOUS at 14:15

## 2025-04-28 RX ADMIN — IRON SUCROSE 300 MG: 20 INJECTION, SOLUTION INTRAVENOUS at 10:37

## 2025-04-28 RX ADMIN — PROPOFOL 50 MG: 10 INJECTION, EMULSION INTRAVENOUS at 14:03

## 2025-04-28 RX ADMIN — ACETAMINOPHEN 650 MG: 325 TABLET ORAL at 08:15

## 2025-04-28 RX ADMIN — PROPOFOL 30 MG: 10 INJECTION, EMULSION INTRAVENOUS at 14:13

## 2025-04-28 RX ADMIN — PROPOFOL 50 MG: 10 INJECTION, EMULSION INTRAVENOUS at 14:06

## 2025-04-28 RX ADMIN — HYDROMORPHONE HYDROCHLORIDE 0.6 MG: 1 INJECTION, SOLUTION INTRAMUSCULAR; INTRAVENOUS; SUBCUTANEOUS at 15:06

## 2025-04-28 RX ADMIN — HYDROMORPHONE HYDROCHLORIDE 0.6 MG: 1 INJECTION, SOLUTION INTRAMUSCULAR; INTRAVENOUS; SUBCUTANEOUS at 05:28

## 2025-04-28 RX ADMIN — PANTOPRAZOLE SODIUM 40 MG: 40 INJECTION, POWDER, FOR SOLUTION INTRAVENOUS at 08:15

## 2025-04-28 RX ADMIN — HYDROMORPHONE HYDROCHLORIDE 0.6 MG: 1 INJECTION, SOLUTION INTRAMUSCULAR; INTRAVENOUS; SUBCUTANEOUS at 09:39

## 2025-04-28 RX ADMIN — PANTOPRAZOLE SODIUM 40 MG: 40 INJECTION, POWDER, FOR SOLUTION INTRAVENOUS at 20:12

## 2025-04-28 RX ADMIN — HYDROMORPHONE HYDROCHLORIDE 0.6 MG: 1 INJECTION, SOLUTION INTRAMUSCULAR; INTRAVENOUS; SUBCUTANEOUS at 01:28

## 2025-04-28 RX ADMIN — HYDROMORPHONE HYDROCHLORIDE 0.6 MG: 1 INJECTION, SOLUTION INTRAMUSCULAR; INTRAVENOUS; SUBCUTANEOUS at 19:24

## 2025-04-28 SDOH — HEALTH STABILITY: MENTAL HEALTH: CURRENT SMOKER: 0

## 2025-04-28 SDOH — SOCIAL STABILITY: SOCIAL INSECURITY: WERE YOU ABLE TO COMPLETE ALL THE BEHAVIORAL HEALTH SCREENINGS?: YES

## 2025-04-28 SDOH — SOCIAL STABILITY: SOCIAL INSECURITY: DO YOU FEEL ANYONE HAS EXPLOITED OR TAKEN ADVANTAGE OF YOU FINANCIALLY OR OF YOUR PERSONAL PROPERTY?: NO

## 2025-04-28 SDOH — SOCIAL STABILITY: SOCIAL INSECURITY: HAVE YOU HAD THOUGHTS OF HARMING ANYONE ELSE?: NO

## 2025-04-28 SDOH — SOCIAL STABILITY: SOCIAL INSECURITY: DO YOU FEEL UNSAFE GOING BACK TO THE PLACE WHERE YOU ARE LIVING?: NO

## 2025-04-28 SDOH — SOCIAL STABILITY: SOCIAL INSECURITY: ARE THERE ANY APPARENT SIGNS OF INJURIES/BEHAVIORS THAT COULD BE RELATED TO ABUSE/NEGLECT?: NO

## 2025-04-28 SDOH — SOCIAL STABILITY: SOCIAL INSECURITY: HAS ANYONE EVER THREATENED TO HURT YOUR FAMILY OR YOUR PETS?: NO

## 2025-04-28 SDOH — SOCIAL STABILITY: SOCIAL INSECURITY: ARE YOU OR HAVE YOU BEEN THREATENED OR ABUSED PHYSICALLY, EMOTIONALLY, OR SEXUALLY BY ANYONE?: NO

## 2025-04-28 SDOH — SOCIAL STABILITY: SOCIAL INSECURITY: HAVE YOU HAD ANY THOUGHTS OF HARMING ANYONE ELSE?: NO

## 2025-04-28 SDOH — SOCIAL STABILITY: SOCIAL INSECURITY: DOES ANYONE TRY TO KEEP YOU FROM HAVING/CONTACTING OTHER FRIENDS OR DOING THINGS OUTSIDE YOUR HOME?: NO

## 2025-04-28 SDOH — SOCIAL STABILITY: SOCIAL INSECURITY: ABUSE: ADULT

## 2025-04-28 ASSESSMENT — ACTIVITIES OF DAILY LIVING (ADL)
ADEQUATE_TO_COMPLETE_ADL: YES
PATIENT'S MEMORY ADEQUATE TO SAFELY COMPLETE DAILY ACTIVITIES?: YES
JUDGMENT_ADEQUATE_SAFELY_COMPLETE_DAILY_ACTIVITIES: YES
HEARING - LEFT EAR: FUNCTIONAL
HEARING - RIGHT EAR: FUNCTIONAL
BATHING: INDEPENDENT
DRESSING YOURSELF: INDEPENDENT
LACK_OF_TRANSPORTATION: NO
TOILETING: INDEPENDENT
FEEDING YOURSELF: INDEPENDENT
GROOMING: INDEPENDENT
WALKS IN HOME: INDEPENDENT
LACK_OF_TRANSPORTATION: NO

## 2025-04-28 ASSESSMENT — COGNITIVE AND FUNCTIONAL STATUS - GENERAL
DAILY ACTIVITIY SCORE: 24
PATIENT BASELINE BEDBOUND: NO
MOBILITY SCORE: 24
DAILY ACTIVITIY SCORE: 24

## 2025-04-28 ASSESSMENT — LIFESTYLE VARIABLES
AUDIT-C TOTAL SCORE: 0
HOW MANY STANDARD DRINKS CONTAINING ALCOHOL DO YOU HAVE ON A TYPICAL DAY: PATIENT DOES NOT DRINK
PRESCIPTION_ABUSE_PAST_12_MONTHS: NO
AUDIT-C TOTAL SCORE: 0
SKIP TO QUESTIONS 9-10: 1
SUBSTANCE_ABUSE_PAST_12_MONTHS: NO
HOW OFTEN DO YOU HAVE 6 OR MORE DRINKS ON ONE OCCASION: NEVER
HOW OFTEN DO YOU HAVE A DRINK CONTAINING ALCOHOL: NEVER

## 2025-04-28 ASSESSMENT — PAIN SCALES - GENERAL
PAINLEVEL_OUTOF10: 0 - NO PAIN
PAINLEVEL_OUTOF10: 7
PAINLEVEL_OUTOF10: 3
PAINLEVEL_OUTOF10: 7
PAINLEVEL_OUTOF10: 5 - MODERATE PAIN

## 2025-04-28 ASSESSMENT — PATIENT HEALTH QUESTIONNAIRE - PHQ9
2. FEELING DOWN, DEPRESSED OR HOPELESS: NOT AT ALL
1. LITTLE INTEREST OR PLEASURE IN DOING THINGS: NOT AT ALL
SUM OF ALL RESPONSES TO PHQ9 QUESTIONS 1 & 2: 0

## 2025-04-28 NOTE — ANESTHESIA PREPROCEDURE EVALUATION
Patient: Cuca Laguerre    Procedure Information       Date/Time: 04/28/25 1600    Scheduled providers: Sophie West MD    Procedure: ENTEROSCOPY    Location: [facility]            Relevant Problems   Cardiac  EKG: Sinus tachycardia  Possible Left atrial enlargement  Nonspecific ST abnormality  Abnormal ECG  When compared with ECG of 02-APR-2025 12:20,  Vent. rate has increased BY  56 BPM     (+) Chest pain   (+) Essential hypertension, benign   (+) Hypercholesterolemia   (+) Mixed hyperlipidemia      Neuro   (+) Anxiety   (+) Cubital tunnel syndrome   (+) Depressive disorder   (+) Major depression   (+) Ulnar neuropathy      GI   (+) GI bleed   (+) Upper GI hemorrhage      Endocrine   (+) Class 1 obesity with body mass index (BMI) of 32.0 to 32.9 in adult   (+) Diabetes mellitus (Multi)   (+) Obese   (+) Severe obesity (BMI 35.0-39.9) with comorbidity (Multi)   (+) Type 2 diabetes mellitus without complication, without long-term current use of insulin      Hematology   (+) Acute blood loss anemia   (+) Anemia   (+) Iron deficiency anemia   (+) Iron deficiency anemia due to dietary causes   (+) Other acquired hemolytic anemias      Musculoskeletal   (+) DDD (degenerative disc disease), lumbar   (+) Fibromyalgia      Circulatory   (+) AVM (arteriovenous malformation) of small bowel, acquired   (+) CAD S/P percutaneous coronary angioplasty   (+) Carotid artery bruit      Hematologic   (+) Low hemoglobin      Infectious/Inflammatory   (+) SIRS (systemic inflammatory response syndrome) (Multi)      Cardiac and Vasculature   (+) H/O non-ST elevation myocardial infarction (NSTEMI)   (+) History of arteriovenous malformation       Clinical information reviewed:                   NPO Detail:  No data recorded     Physical Exam    Airway  Mallampati: II  TM distance: >3 FB  Neck ROM: full  Mouth opening: 3 or more finger widths     Cardiovascular   Rhythm: regular  Rate: normal     Dental    Pulmonary -  normal exam   Abdominal - normal exam           Anesthesia Plan    History of general anesthesia?: yes  History of complications of general anesthesia?: no    ASA 3 - emergent     MAC     The patient is not a current smoker.    intravenous induction   Anesthetic plan and risks discussed with patient.  Use of blood products discussed with patient who consented to blood products.    Plan discussed with CRNA.

## 2025-04-28 NOTE — ED PROVIDER NOTES
HPI   Chief Complaint   Patient presents with    Chest Pain     Bladder surgery on April 4th, my blood count was low, I take Plavix. Today I got chest heaviness, I tried 2 NTG and no relief so I came in.       This is a 64-year-old female with PMH CAD, stents, Plavix, HTN, HLD, ROHIT recent April 4  TURBT urothelial carcinoma by Dr. Ortiz presenting for evaluation of 3-day history of black tarry stools, exertional dyspnea, near syncope, feels like an elephant sitting on her chest.  Took 2 nitro without alleviation.  Can barely walk across the room without symptoms.      History provided by:  Patient   used: No            Patient History   Medical History[1]  Surgical History[2]  Family History[3]  Social History[4]    Physical Exam   ED Triage Vitals [04/27/25 1941]   Temperature Heart Rate Respirations BP   36.6 °C (97.9 °F) (!) 118 (!) 22 128/61      Pulse Ox Temp Source Heart Rate Source Patient Position   100 % Temporal Monitor Sitting      BP Location FiO2 (%)     Right arm --       Physical Exam    General: Vitals noted, moderate distress.  Afebrile.  Pale appearing.  Neck: Trachea midline. No JVD appreciated.  Cardiac: Tachycardic rate regular rhythm. No murmur  Pulmonary: Lungs clear bilaterally with good aeration.  Tachypneic.  No adventitious breath sounds.  Abdomen: Soft.  Nontender.  No rebound.  No guarding.  Extremities: No peripheral edema  Skin: No rash  Neuro: No focal neurologic deficits    ED Course & MDM   Diagnoses as of 04/27/25 2353   Gastrointestinal hemorrhage, unspecified gastrointestinal hemorrhage type   Upper GI hemorrhage                 No data recorded     [facility] Coma Scale Score: 15 (04/27/25 1941 : Beth Guerrero, ROBIN)                           Medical Decision Making  DDx: Anemia, GI bleed, ACS, NSTEMI, PE    EKG interpreted by me: Sinus tachycardia.  Rate 118.  Normal axis.  QTc 420.  No acute T wave changes.  No STEMI.    Patient presents in moderate  distress.  Tachypneic.  Appears pale.  Tachycardic.  Blood pressure low 100s.  Soft nontender abdomen.  Was placed on 2 L nasal cannula for comfort.  Is on Plavix.  Is not on home PPI.  Was loaded with 80 mg Protonix here.  CT chest abdomen pelvis without contrast was obtained due to contrast allergy showing moderate emphysematous changes and adrenal gland nodules compatible with adenomas and wall thickening in the distal colon.  Her hemoglobin has dropped to 7.9 from 9.23 weeks ago.  Has no an active passage of stool or bowel movements here in the emergency department.  Her blood gas demonstrates a pH of 7.4 and a pCO2 of 29 and a bicarb of 18.4.  Does not require transfusion at this time given that her hemoglobin is 7.9.  Her white count is 13.3 combined with tachycardia there was concern for sepsis so was given IV vancomycin IV Zosyn.  I think her dyspnea symptoms are secondary to anemia and demand.  After IV fluids 1 L normal saline her blood pressure improved and her heart rate improved.  She was given IV morphine 4 mg IV Zofran 4 mg IV Dilaudid 0.5 mg.  I discussed with Dr. Perez gastroenterology who advised n.p.o. after midnight and would evaluate the patient in the morning.  Discussed with the hospitalist who accepted.  This visit was staffed with the attending physician Dr. Sampson.      Disclaimer: This note was dictated using speech recognition software. An attempt at proofreading was made to minimize errors. Minor errors in transcription may be present.    Amount and/or Complexity of Data Reviewed  Labs: ordered.  Radiology: ordered.  ECG/medicine tests: ordered and independent interpretation performed.        Procedure  Procedures       [1]   Past Medical History:  Diagnosis Date    Anxiety     Bladder cancer (Multi)     Chronic pain disorder     Coronary artery disease     Diabetes mellitus (Multi)     Fibromyalgia, primary     Heart disease     Hyperlipidemia     Hypertension     Lumbar disc disease      Myocardial infarction (Multi)     PONV (postoperative nausea and vomiting)    [2]   Past Surgical History:  Procedure Laterality Date    APPENDECTOMY      CT HEAD ANGIO W AND WO IV CONTRAST  2023    CT HEAD ANGIO W AND WO IV CONTRAST 2023 DOCTOR OFFICE [facility]GAC[facility]    HYSTERECTOMY  2014    Hysterectomy    MR HEAD ANGIO WO IV CONTRAST  2023    MR HEAD ANGIO WO IV CONTRAST 2023 DOCTOR OFFICE LEGACY    OTHER SURGICAL HISTORY  09/15/2022    Appendectomy    OTHER SURGICAL HISTORY  2022    Transurethral resection of bladder tumor    OTHER SURGICAL HISTORY  06/15/2022    Surgery    OTHER SURGICAL HISTORY  06/15/2022    Colonoscopy    OTHER SURGICAL HISTORY  06/15/2022    Ulnar nerve transposition    OTHER SURGICAL HISTORY  2022    Shoulder surgery    TONSILLECTOMY  2014    Tonsillectomy   [3]   Family History  Problem Relation Name Age of Onset    Dementia Mother      Heart disease Mother      Diabetes Mother      Hypertension Mother      Other (cardiac disorder) Father      Diabetes Father      Diabetes Brother     [4]   Social History  Tobacco Use    Smoking status: Former     Current packs/day: 0.00     Average packs/day: 0.5 packs/day for 10.0 years (5.0 ttl pk-yrs)     Types: Cigarettes     Start date: 2009     Quit date: 2019     Years since quittin.4    Smokeless tobacco: Never   Vaping Use    Vaping status: Never Used   Substance Use Topics    Alcohol use: Yes     Comment: socially    Drug use: Never        Paul Arguello PA-C  [phone]

## 2025-04-28 NOTE — CARE PLAN
The patient's goals for the shift include rest    The clinical goals for the shift include safety, adequate rest

## 2025-04-28 NOTE — H&P
Medical Group History and Physical      ASSESSMENT & PLAN:     64 y.o. female with a history of prior GI bleed secondary to small bowel AVM, CAD s/p PTCA on Plavix, essential hypertension, iron deficiency anemia, urothelial carcinoma with recent recurrence now s/p TURBT (4/4/25) presenting with acute blood loss anemia secondary to upper GI bleed.    #.  Acute blood loss anemia secondary to upper GI hemorrhage  #.  History of AVM  #.  History of ROHIT  - P/w 1 week of melena, Hgb 7.9 (baseline around 9.0), on Plavix  - Does have a history of small bowel AVM  - IV PPI twice daily  - N.p.o.  - GI consult  - Holding home Plavix  - Transfuse for Hgb < 7  - Recheck iron studies  - Avoid NSAIDs  - Telemetry  - Outpatient follow-up with hematology for iron infusions, follows with Dr. Khan    #.  SIRS  - Likely reactive in the setting of GI bleed  - CT C/A/P reviewed without infectious source, low suspicion for sepsis  - Plan as above for GI bleed, defer further antibiotics  - Follow up blood cultures drawn in ER    #.  CAD s/p PTCA  #.  Essential hypertension  - Endorsing some chest pain, troponin negative x 2, EKG shows nonspecific ST changes  - Likely precipitated by underlying anemia, low suspicion for ACS  - Holding home Plavix  - Holding home antihypertensives given soft pressures  - Resume home statin when cleared for PO intake    #.  Urothelial carcinoma  - Follows with urology, recent recurrence noted on cystoscopy and now s/p TURBT on 4/4/25  - Outpatient follow-up with urology      VTE PPX: SCDs only      Tim Pederson MD    --Of note, this documentation is completed using the Dragon Dictation system (voice recognition software). There may be spelling and/or grammatical errors that were not corrected prior to final submission.--    HISTORY OF PRESENT ILLNESS:   Chief Complaint: Black stools    Cuca Laguerre is a 64 y.o. female with a history of prior GI bleed secondary to small bowel AVM, CAD s/p PTCA  on Plavix, essential hypertension, iron deficiency anemia, urothelial carcinoma with recent recurrence now s/p TURBT (4/4/25) presenting with dark stools for the last week.  Patient also states that she been having dyspnea on exertion, dizziness, and chest pain.  She took nitroglycerin without relief.  She denies any cough, fever, chills.  Denies any vomiting or bright red blood per rectum.       ER Course: Tachycardic, otherwise vital signs stable.  Labs notable for hyperglycemia, hyponatremia, elevated BUN, leukocytosis, acute on chronic microcytic anemia.  CT C/A/P shows moderate emphysematous changes, adrenal adenomas, and wall thickening of distal colon.  Patient was given IV Protonix 80 mg, 1 L normal saline and Zosyn.  Blood cultures drawn.    ROS  10 point review of systems negative except per HPI     PAST HISTORIES:     Past Medical History  She has a past medical history of Anxiety, Bladder cancer (Multi), Chronic pain disorder, Coronary artery disease, Diabetes mellitus (Multi), Fibromyalgia, primary, Heart disease, Hyperlipidemia, Hypertension, Lumbar disc disease, Myocardial infarction (Multi), and PONV (postoperative nausea and vomiting).    Surgical History  She has a past surgical history that includes Tonsillectomy (04/01/2014); Hysterectomy (04/01/2014); Other surgical history (09/15/2022); Other surgical history (07/25/2022); Other surgical history (06/15/2022); Other surgical history (06/15/2022); Other surgical history (06/15/2022); Other surgical history (07/25/2022); MR angio head wo IV contrast (01/29/2023); CT angio head w and wo IV contrast (01/30/2023); and Appendectomy.     Social History  She reports that she quit smoking about 5 years ago. Her smoking use included cigarettes. She started smoking about 15 years ago. She has a 5 pack-year smoking history. She has never used smokeless tobacco. She reports current alcohol use. She reports that she does not use drugs.    Family  History  Family History[1]    Allergies:  Iodides, Iodinated contrast media, Erythromycin, Gabapentin, Moxifloxacin, Pregabalin, Metformin, and Sulfa (sulfonamide antibiotics)      OBJECTIVE:      Last Recorded Vitals  /62   Pulse 94   Temp 36.6 °C (97.9 °F) (Temporal)   Resp 18   Wt 72.6 kg (160 lb)   SpO2 100%     Last I/O:  No intake/output data recorded.    Physical Exam   Gen: NAD, pale appearing  HEENT: EOM, MMM  CV: Tachycardic with regular rhythm, no murmurs rubs or gallops  Resp: Clear to auscultation bilaterally, normal effort  Abdomen: soft, NT,+BS  LE: No edema, no deformity  Neuro: A&Ox4, moving all extremities    LABS AND IMAGING:       Relevant Results  Labs Reviewed   CBC WITH AUTO DIFFERENTIAL - Abnormal       Result Value    WBC 13.3 (*)     nRBC 0.0      RBC 3.58 (*)     Hemoglobin 7.9 (*)     Hematocrit 26.5 (*)     MCV 74 (*)     MCH 22.1 (*)     MCHC 29.8 (*)     RDW 21.3 (*)     Platelets 370      Neutrophils % 75.2      Immature Granulocytes %, Automated 0.4      Lymphocytes % 16.6      Monocytes % 6.6      Eosinophils % 0.7      Basophils % 0.5      Neutrophils Absolute 9.99 (*)     Immature Granulocytes Absolute, Automated 0.05      Lymphocytes Absolute 2.20      Monocytes Absolute 0.87      Eosinophils Absolute 0.09      Basophils Absolute 0.07     COMPREHENSIVE METABOLIC PANEL - Abnormal    Glucose 216 (*)     Sodium 134 (*)     Potassium 3.7      Chloride 107      Bicarbonate 18 (*)     Anion Gap 13      Urea Nitrogen 29 (*)     Creatinine 0.84      eGFR 78      Calcium 9.1      Albumin 4.2      Alkaline Phosphatase 72      Total Protein 6.9      AST 16      Bilirubin, Total 0.2      ALT 18     APTT - Abnormal    aPTT 25 (*)     Narrative:     The APTT is no longer used for monitoring Unfractionated Heparin Therapy. For monitoring Heparin Therapy, use the Heparin Assay.   LIPASE - Abnormal    Lipase 143 (*)     Narrative:     Venipuncture immediately after or during the  administration of Metamizole may lead to falsely low results. Testing should be performed immediately prior to Metamizole dosing.   BLOOD GAS VENOUS FULL PANEL - Abnormal    POCT pH, Venous 7.41      POCT pCO2, Venous 29 (*)     POCT pO2, Venous 61 (*)     POCT SO2, Venous 94 (*)     POCT Oxy Hemoglobin, Venous 90.7 (*)     POCT Hematocrit Calculated, Venous 22.0 (*)     POCT Sodium, Venous 135 (*)     POCT Potassium, Venous 3.6      POCT Chloride, Venous 110 (*)     POCT Ionized Calicum, Venous 1.18      POCT Glucose, Venous 202 (*)     POCT Lactate, Venous 0.8      POCT Base Excess, Venous -5.6 (*)     POCT HCO3 Calculated, Venous 18.4 (*)     POCT Hemoglobin, Venous 7.4 (*)     POCT Anion Gap, Venous 10.0      Patient Temperature        FiO2 28     MAGNESIUM - Normal    Magnesium 2.10     PROTIME-INR - Normal    Protime 10.7      INR 1.0     B-TYPE NATRIURETIC PEPTIDE - Normal    BNP 23      Narrative:        <100 pg/mL - Heart failure unlikely  100-299 pg/mL - Intermediate probability of acute heart                  failure exacerbation. Correlate with clinical                  context and patient history.    >=300 pg/mL - Heart Failure likely. Correlate with clinical                  context and patient history.    BNP testing is performed using different testing methodology at [facility] than at other [facility]. Direct result comparisons should only be made within the same method.      LACTATE - Normal    Lactate 0.8      Narrative:     Venipuncture immediately after or during the administration of Metamizole may lead to falsely low results. Testing should be performed immediately prior to Metamizole dosing.   SERIAL TROPONIN-INITIAL - Normal    Troponin I, High Sensitivity 4      Narrative:     Less than 99th percentile of normal range cutoff-  Female and children under 18 years old <14 ng/L; Male <21 ng/L: Negative  Repeat testing should be performed if clinically indicated.     Female  and children under 18 years old 14-50 ng/L; Male 21-50 ng/L:  Consistent with possible cardiac damage and possible increased clinical   risk. Serial measurements may help to assess extent of myocardial damage.     >50 ng/L: Consistent with cardiac damage, increased clinical risk and  myocardial infarction. Serial measurements may help assess extent of   myocardial damage.      NOTE: Children less than 1 year old may have higher baseline troponin   levels and results should be interpreted in conjunction with the overall   clinical context.     NOTE: Troponin I testing is performed using a different   testing methodology at [facility] than at other   [facility]. Direct result comparisons should only   be made within the same method.   SERIAL TROPONIN, 1 HOUR - Normal    Troponin I, High Sensitivity 4      Narrative:     Less than 99th percentile of normal range cutoff-  Female and children under 18 years old <14 ng/L; Male <21 ng/L: Negative  Repeat testing should be performed if clinically indicated.     Female and children under 18 years old 14-50 ng/L; Male 21-50 ng/L:  Consistent with possible cardiac damage and possible increased clinical   risk. Serial measurements may help to assess extent of myocardial damage.     >50 ng/L: Consistent with cardiac damage, increased clinical risk and  myocardial infarction. Serial measurements may help assess extent of   myocardial damage.      NOTE: Children less than 1 year old may have higher baseline troponin   levels and results should be interpreted in conjunction with the overall   clinical context.     NOTE: Troponin I testing is performed using a different   testing methodology at [facility] than at other   [facility]. Direct result comparisons should only   be made within the same method.   BLOOD CULTURE   BLOOD CULTURE   TROPONIN SERIES- (INITIAL, 1 HR)    Narrative:     The following orders were created for panel order  Troponin I Series, High Sensitivity (0, 1 HR).  Procedure                               Abnormality         Status                     ---------                               -----------         ------                     Troponin I, High Sensiti...[[id number]]  Normal              Final result               Troponin, High Sensitivi...[[id number]]  Normal              Final result                 Please view results for these tests on the individual orders.   TYPE AND SCREEN    ABO TYPE A      Rh TYPE NEG      ANTIBODY SCREEN NEG     MORPHOLOGY    RBC Morphology See Below      Polychromasia Mild      Ovalocytes Few      Carson Cells Few       CT chest abdomen pelvis wo IV contrast   Final Result   Moderate emphysematous changes scattered throughout the lungs        1.3 cm right and 2 cm left adrenal gland nodules most compatible with   adenomas.        Wall thickening of the distal colon. Recommend nonemergent   colonoscopy to further evaluate. Scattered colonic diverticulosis.             MACRO:   None.        Signed by: Evan Finkelstein 4/27/2025 10:47 PM   Dictation workstation:   [id number]      XR chest 1 view   Final Result   1.  No evidence of acute cardiopulmonary process.                  MACRO:   None        Signed by: Raissa Bell 4/27/2025 9:24 PM   Dictation workstation:   [id number]                 [1]   Family History  Problem Relation Name Age of Onset    Dementia Mother      Heart disease Mother      Diabetes Mother      Hypertension Mother      Other (cardiac disorder) Father      Diabetes Father      Diabetes Brother

## 2025-04-28 NOTE — CONSULTS
Department of Internal Medicine  Gastroenterology  Consult note    IMPRESSION/RECOMMENDATIONS  Cuca Laguerre is a 64 y.o. female with a PMH of CAD s/p stents on Plavix, HTN, ROHIT, small bowel AVM, urothelial carcinoma with recent recurrence now s/p TURBT 4/4/2025 presents to [facility] with chief complaint of dark stools x 1 week associated with dyspnea on exertion, dizziness and chest pain.  Hgb 7.9, MCV 74, iron 27, ferritin 10.  CT with thickening of the distal colon and scattered colonic diverticulosis.    Symptomatic microcytic anemia Hgb 7.9, MCV 74.  Baseline Hgb 9.0.    Iron indices indicate ROHIT.   Reported melena  History of small bowel AVMs, history of ROHIT (follows Dr. Khan)  Antiplatelet therapy with Plavix last dose 4/26/2025    - Keep n.p.o. for possible EGD with push enteroscopy later today  - Pantoprazole 40 mg IV twice daily  - Trend H&H and transfuse PRBC for Hgb <7-8.0  - Monitor stool color and consistency and document  - Hold Plavix  - Supportive care per medicine team      S/p EGD/push enteroscopy 4/28/2025 with Dr. Díaz indicated for anemia, ROHIT, melena  Impression  2 angioectasias in the jejunum s/p APC (small bowel settings).  The duodenum appeared normal.  Edematous, erythematous mucosa with erosions in the stomach s/p biopsies.   Small hiatal hernia (Hill grade II).   Normal esophagus.     -Await pathology results  -Repeat push enteroscopy PRN for ROHIT.   -Can also consider video capsule endoscopy if melena and/or anemia persists  -Continue PPI 40 twice daily  - Follow-up with hematology as scheduled  -Continue to monitor stool color and consistency and notify GI with any concerns of overt GI bleeding  - Trend H&H and transfuse PRBC for Hgb <7-8.0  - Follow-up with GI in 2 weeks to review pathology results.    Discussed with Dr. Díaz and Dr. Perez    Reason for Consult: GIB    History of Present Illness    Cuca Laguerre is a 64 y.o. female with a PMH of CAD s/p stents on Plavix, HTN,  ROHIT, small bowel AVM, urothelial carcinoma with recent recurrence now s/p TURBT 4/4/2025 presents to [facility] with chief complaint of dark stools x 1 week associated with dyspnea on exertion, dizziness and chest pain.  Patient denies fever, chills, abdominal pain, cough, sore throat or nasal congestion, nausea, vomiting or diarrhea.    On arrival to ED, patient was afebrile, tachypneic, tachycardic, normotensive.  SPO2 100%.  Initial blood work showed WBC 13.3, Hgb 7.9, HCT 26.5%, MCV 74, platelets 370.  INR 1.0.  Metabolic panel with hyperglycemia serum glucose 216, serum sodium 134, serum potassium 3.7.  Bicarbonate 18, BUN 29, creatinine 0.84.  LFTs normal.  Lipase 143.  Lactate normal.  Iron 27, TIBC 403, iron saturation 7%, and ferritin level 10.  CT chest abdomen pelvis without IV contrast showed wall thickening of the distal colon, scattered colonic diverticulosis.  Patient was given 80 mg IV Protonix bolus and 1 L of fluids.  Zosyn was started after blood cultures were drawn.  Patient will be admitted to regular medical floor under the hospitalist service and GI has been consulted for concern for GI bleeding.    Patient was seen and examined at bedside.  1 unit of RBCs infusing.  Iron supplement infusing.  Patient alert and oriented x 3.  Afebrile and hemodynamically stable.  Denies abdominal pain, nausea, vomiting, chest pain.  Does have dyspnea on exertion, weakness.  Reports having black stool for over a week.  Patient does take Plavix, aspirin was discontinued after her last GIB 2/2 small bowel AVMs.  Last Plavix dose 4/26/2025.  Patient denies abdominal pain, diarrhea.  Endoscopic history listed below.    ENDOSCOPIC REVIEW  EGD: 10/8/2024 with Dr. Shepard indicated for melena  Impression  The esophagus appeared normal.  Abnormal mucosa  STOMACH BIOPSY:   -- GASTRIC MUCOSA WITH REACTIVE GASTROPATHY  -- HELICOBACTER PYLORI NOT IDENTIFIED      Colonoscopy: 10/8/2024 with Dr. Shepard indicated for  melena  Impression  Subcentimeter polyp in the transverse colon was removed with cold snare  Diverticulosis in the descending colon and sigmoid colon  Small hemorrhoids  Repeat colonoscopy in 5 years, due October, 2029  TRANSVERSE COLON POLYP:      -- FRAGMENTS OF TUBULAR ADENOMA     Enteroscopy 6/9/2021 with Dr. Gaona indicated for abnormal video capsule endoscopy, ROHIT  Impression:             - A single non-bleeding angiodysplastic lesion in the duodenum. Treated with argon plasma coagulation (APC).  - Normal examined duodenum.  - Gastritis.  - Normal esophagus.  - No specimens collected.    Capsule endoscopy by Dr. Andrade 5/18/2021  - A single angioectasia without bleeding in the small bowel.  - Small bowel erosion visualized.     Colonoscopy by Dr Gaona 1/6/2021  - Two 3 mm polyps in the descending colon and in the transverse colon, removed with a cold biopsy forceps. Resected and retrieved. Tubular adenomas and hyperplastic.   - Diverticulosis in the sigmoid colon.  - Internal hemorrhoids.  - The examination was otherwise normal.     EGD by Dr Díaz 1/6/2021  - Normal esophagus.  - Erosive gastropathy with no stigmata of recent bleeding. Bx normal gastric tissue, no H. Pylori.   - A single gastric polyp. Biopsied. Inflammatory polyp.   - Normal examined duodenum. Biopsied. Bx normal duodenal tissue. No celiac.     Allergies  Iodides, Iodinated contrast media, Erythromycin, Gabapentin, Moxifloxacin, Pregabalin, Metformin, and Sulfa (sulfonamide antibiotics)    Current Medication  Current Medications[1]    Past Medical History  Active Ambulatory Problems     Diagnosis Date Noted    Anemia 10/19/2023    Acute blood loss anemia 10/19/2023    Anxiety 10/19/2023    Depressive disorder 05/18/2012    AVM (arteriovenous malformation) of small bowel, acquired 10/19/2023    Biceps tendon tear 10/19/2023    Bilateral leg weakness 10/19/2023    CAD S/P percutaneous coronary angioplasty 10/19/2023    Carotid artery bruit  10/19/2023    Chest pain 10/19/2023    Chronic venous insufficiency 10/19/2023    Cubital tunnel syndrome 10/19/2023    DDD (degenerative disc disease), lumbar 05/20/2015    Major depression 10/19/2023    Diabetes mellitus (Multi) 10/19/2023    Edema 10/19/2023    Elevated liver enzymes 10/19/2023    Essential hypertension, benign 05/18/2012    Fibromyalgia 10/19/2023    Generalized arthritis 10/19/2023    H/O colonoscopy 10/19/2023    Hand pain 10/19/2023    Heart disease 10/19/2023    History of bladder cancer 07/17/2023    Hematoma of groin 10/19/2023    History of myocardial infarction 10/19/2023    Iliotibial band syndrome 10/19/2023    Adhesive capsulitis of left shoulder 10/19/2023    Impingement syndrome of left shoulder 10/19/2023    Insomnia 10/19/2023    Low back pain 05/20/2015    Iron deficiency anemia due to dietary causes 01/22/2019    Iron deficiency anemia 10/19/2023    Low hemoglobin 10/19/2023    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[facility]) 02/19/2019    Hypercholesterolemia 10/19/2023    Mixed hyperlipidemia 10/19/2023    Muscle spasm 10/19/2023    Numbness and tingling 10/19/2023    Obese 10/19/2023    Severe obesity (BMI 35.0-39.9) with comorbidity (Multi) 04/18/2023    Other acquired hemolytic anemias 01/22/2019    Pain 10/19/2023    Palpitations 10/19/2023    Rotator cuff tear 10/19/2023    Stiffness of left shoulder, not elsewhere classified 10/19/2023    Superior glenoid labrum lesion of shoulder 10/19/2023    Swelling of shoulder joint 10/19/2023    Tear of left biceps muscle 10/19/2023    Type 2 diabetes mellitus without complication, without long-term current use of insulin 10/04/2011    Ulnar neuropathy 05/20/2015    Urothelial carcinoma 04/18/2023    Vitamin D deficiency 10/19/2023    Wound drainage 10/19/2023    Class 1 obesity with body mass index (BMI) of 32.0 to 32.9 in adult 10/19/2023    H/O non-ST elevation myocardial infarction (NSTEMI) 10/19/2023    Perioperative pain 10/19/2023    BMI  30.0-30.9,adult 11/03/2023    Former cigarette smoker 11/03/2023    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    Urinary frequency 09/26/2024    GI bleed 09/28/2024     Resolved Ambulatory Problems     Diagnosis Date Noted    Coronary artery disease with angina pectoris 10/19/2023     Past Medical History:   Diagnosis Date    Bladder cancer (Multi)     Chronic pain disorder     Coronary artery disease     Fibromyalgia, primary     Hyperlipidemia     Hypertension     Lumbar disc disease     Myocardial infarction (Multi)     PONV (postoperative nausea and vomiting)        Past Surgical History  Surgical History[2]    Family History  Family History[3]  No family history of GI malignancies     Social History  TOBACCO:  reports that she quit smoking about 5 years ago. Her smoking use included cigarettes. She started smoking about 15 years ago. She has a 5 pack-year smoking history. She has never used smokeless tobacco.  ETOH:  reports current alcohol use.  DRUGS:  reports no history of drug use.    Review of Systems  Review of systems negative unless otherwise stated above.    PHYSICAL EXAM  VS: /65 (BP Location: Left arm, Patient Position: Lying)   Pulse 72   Temp 36.5 °C (97.7 °F) (Temporal)   Resp 18   Ht 1.524 m (5')   Wt 73.4 kg (161 lb 13.1 oz)   SpO2 99%   BMI 31.60 kg/m²  Body mass index is 31.6 kg/m².  Gen: NAD, pale appearing  HEENT: EOM, MMM  CV: Tachycardic with regular rhythm, no murmurs rubs or gallops  Resp: Clear to auscultation bilaterally, normal effort  Abdomen: soft, NT,+BS  LE: No edema, no deformity  Neuro: A&Ox4, moving all extremities    DATA  Recent blood work and relevant radiology and endoscopic studies were reviewed and discussed with the patient   Results from last 7 days   Lab Units 04/28/25  0535   WBC AUTO x10*3/uL 9.8   RBC AUTO x10*6/uL 2.98*   HEMOGLOBIN g/dL 6.5*   HEMATOCRIT % 21.9*   MCV fL 74*   MCHC g/dL 29.7*   RDW % 21.0*   PLATELETS AUTO x10*3/uL 289        Results from last 72 hours   Lab Units 04/28/25  0535 04/27/25 2008   SODIUM mmol/L 136 134*   POTASSIUM mmol/L 3.8 3.7   CHLORIDE mmol/L 113* 107   CO2 mmol/L 18* 18*   BUN mg/dL 20 29*   CREATININE mg/dL 0.55 0.84   CALCIUM mg/dL 8.4* 9.1   PROTEIN TOTAL g/dL  --  6.9   BILIRUBIN TOTAL mg/dL  --  0.2   ALK PHOS U/L  --  72   AST U/L  --  16   ALT U/L  --  18       Results from last 72 hours   Lab Units 04/27/25 2008   INR  1.0       RADIOLOGY REVIEW  === 04/27/25 ===    CT CHEST ABDOMEN PELVIS WO CONTRAST    - Impression -  Moderate emphysematous changes scattered throughout the lungs    1.3 cm right and 2 cm left adrenal gland nodules most compatible with  adenomas.    Wall thickening of the distal colon. Recommend nonemergent  colonoscopy to further evaluate. Scattered colonic diverticulosis.      MACRO:  None.    Signed by: Evan Finkelstein 4/27/2025 10:47 PM  Dictation workstation:   [id number]       (Electronically signed byJOSS De Los Santos-CNP on 4/28/2025 at 9:38 AM)         [1]   Current Facility-Administered Medications:     acetaminophen (Tylenol) tablet 650 mg, 650 mg, oral, q4h PRN, 650 mg at 04/28/25 0815 **OR** acetaminophen (Tylenol) oral liquid 650 mg, 650 mg, nasogastric tube, q4h PRN **OR** acetaminophen (Tylenol) suppository 650 mg, 650 mg, rectal, q4h PRN, Tim Pederson MD    atorvastatin (Lipitor) tablet 80 mg, 80 mg, oral, Nightly, Tim Pederson MD    [Held by provider] clopidogrel (Plavix) tablet 75 mg, 75 mg, oral, Daily, Tim Pederson MD    ezetimibe (Zetia) tablet 10 mg, 10 mg, oral, Daily, Tim Pederson MD, 10 mg at 04/28/25 0815    HYDROmorphone (Dilaudid) injection 0.6 mg, 0.6 mg, intravenous, q4h PRN, Tim Pederson MD, 0.6 mg at 04/28/25 0528    iron sucrose (Venofer) 300 mg in sodium chloride 0.9% 282 mL IV, 300 mg, intravenous, Once, Tim Pederson MD    melatonin tablet 3 mg, 3 mg, oral, Nightly PRN, Tim Pederson MD    ondansetron  (Zofran) tablet 4 mg, 4 mg, oral, q8h PRN **OR** ondansetron (Zofran) injection 4 mg, 4 mg, intravenous, q8h PRN, Tim Pedesron MD    pantoprazole (Protonix) injection 40 mg, 40 mg, intravenous, BID, Tim Pederson MD, 40 mg at 04/28/25 0815    polyethylene glycol (Glycolax, Miralax) packet 17 g, 17 g, oral, Daily PRN, Tim Pederson MD  [2]   Past Surgical History:  Procedure Laterality Date    APPENDECTOMY      CT HEAD ANGIO W AND WO IV CONTRAST  01/30/2023    CT HEAD ANGIO W AND WO IV CONTRAST 1/30/2023 DOCTOR OFFICE [facility]    HYSTERECTOMY  04/01/2014    Hysterectomy    MR HEAD ANGIO WO IV CONTRAST  01/29/2023    MR HEAD ANGIO WO IV CONTRAST 1/29/2023 DOCTOR OFFICE LEGACY    OTHER SURGICAL HISTORY  09/15/2022    Appendectomy    OTHER SURGICAL HISTORY  07/25/2022    Transurethral resection of bladder tumor    OTHER SURGICAL HISTORY  06/15/2022    Surgery    OTHER SURGICAL HISTORY  06/15/2022    Colonoscopy    OTHER SURGICAL HISTORY  06/15/2022    Ulnar nerve transposition    OTHER SURGICAL HISTORY  07/25/2022    Shoulder surgery    TONSILLECTOMY  04/01/2014    Tonsillectomy   [3]   Family History  Problem Relation Name Age of Onset    Dementia Mother      Heart disease Mother      Diabetes Mother      Hypertension Mother      Other (cardiac disorder) Father      Diabetes Father      Diabetes Brother

## 2025-04-28 NOTE — CARE PLAN
The patient's goals for the shift include comfort  Problem: Pain - Adult  Goal: Verbalizes/displays adequate comfort level or baseline comfort level  Outcome: Progressing     Problem: Safety - Adult  Goal: Free from fall injury  Outcome: Progressing     Problem: Discharge Planning  Goal: Discharge to home or other facility with appropriate resources  Outcome: Progressing     Problem: Chronic Conditions and Co-morbidities  Goal: Patient's chronic conditions and co-morbidity symptoms are monitored and maintained or improved  Outcome: Progressing     Problem: Nutrition  Goal: Nutrient intake appropriate for maintaining nutritional needs  Outcome: Progressing     Problem: Pain  Goal: Takes deep breaths with improved pain control throughout the shift  Outcome: Progressing  Goal: Turns in bed with improved pain control throughout the shift  Outcome: Progressing  Goal: Walks with improved pain control throughout the shift  Outcome: Progressing  Goal: Performs ADL's with improved pain control throughout shift  Outcome: Progressing  Goal: Free from opioid side effects throughout the shift  Outcome: Progressing  Goal: Free from acute confusion related to pain meds throughout the shift  Outcome: Progressing       The clinical goals for the shift include safety, monitor for bleeding, and update with plan of care     oral

## 2025-04-28 NOTE — PROGRESS NOTES
04/28/25 1638   Discharge Planning   Living Arrangements Children   Support Systems Children;Family members   Type of Residence Private residence   Who is requesting discharge planning? Provider   Expected Discharge Disposition Home   Does the patient need discharge transport arranged? No   Financial Resource Strain   How hard is it for you to pay for the very basics like food, housing, medical care, and heating? Not very   Housing Stability   In the last 12 months, was there a time when you were not able to pay the mortgage or rent on time? N   At any time in the past 12 months, were you homeless or living in a shelter (including now)? N   Transportation Needs   In the past 12 months, has lack of transportation kept you from medical appointments or from getting medications? no   In the past 12 months, has lack of transportation kept you from meetings, work, or from getting things needed for daily living? No   Intensity of Service   Intensity of Service 0-30 min     Met with pt & her daughter Kadie in room. Independent with ADL's prior to admit. Plan for dc home. No dc needs currently identified.

## 2025-04-28 NOTE — PROGRESS NOTES
Cuca Laguerre is a 64 y.o. female on day 1 of admission presenting with Upper GI hemorrhage.    Subjective   Patient seen and examined.  She was laying/sitting on bed.  Appeared comfortable, denied any chest pain or dizziness right now but says that she did have it at home.  No trouble breathing.  Does complain of mild belly pain.       Objective     Physical Exam  Gen: NAD, pale appearing  HEENT: EOM, MMM  CV: Tachycardic with regular rhythm, no murmurs rubs or gallops  Resp: Clear to auscultation bilaterally, normal effort  Abdomen: soft, NT,+BS  LE: No edema, no deformity  Neuro: A&Ox4, moving all extremities  Last Recorded Vitals  Blood pressure 143/66, pulse 75, temperature 36.9 °C (98.4 °F), temperature source Temporal, resp. rate 18, height 1.524 m (5'), weight 73.4 kg (161 lb 13.1 oz), SpO2 99%.  Intake/Output last 3 Shifts:  I/O last 3 completed shifts:  In: 1500 (20.4 mL/kg) [IV Piggyback:1500]  Out: - (0 mL/kg)   Weight: 73.4 kg     Relevant Results                              Assessment & Plan  Upper GI hemorrhage    Acute blood loss anemia    CAD S/P percutaneous coronary angioplasty    Essential hypertension, benign    Iron deficiency anemia    Urothelial carcinoma    History of arteriovenous malformation    SIRS (systemic inflammatory response syndrome) (Multi)    64 y.o. female with a history of prior GI bleed secondary to small bowel AVM, CAD s/p PTCA on Plavix, essential hypertension, iron deficiency anemia, urothelial carcinoma with recent recurrence now s/p TURBT (4/4/25) presenting with acute blood loss anemia secondary to upper GI bleed And SIRS    Patient has a history of AVM and previous GI bleed  Keep her n.p.o.  Continue Protonix IV twice daily  Continue to hold home Plavix  She is getting 1 unit of blood   Iron studies showed iron deficiency anemia, outpatient follow-up with hematology for iron infusion  Avoid NSAIDs  Consult GI  Observe on telemetry  Blood pressure is  stable/improving  For now I will hold antihypertensives can resume tomorrow if blood pressure remains stable   diet advancement as per GIs recs  Outpatient follow-up with urology for urothelial carcinoma she is status post TURBT on 4/4/2025  DVT prophylaxis            Denny Alcantar MD

## 2025-04-28 NOTE — ANESTHESIA POSTPROCEDURE EVALUATION
Patient: Cuca Laguerre    Procedure Summary       Date: 04/28/25 Room / Location: [facility]    Anesthesia Start: 1350 Anesthesia Stop:     Procedure: ENTEROSCOPY Diagnosis:       Gastrointestinal hemorrhage, unspecified gastrointestinal hemorrhage type      Gastrointestinal hemorrhage with melena    Scheduled Providers: Wang Díaz DO; Sophie West MD; Harry Huffman RN; Nessa Friedman Responsible Provider: Sophie West MD    Anesthesia Type: MAC ASA Status: 3 - Emergent            Anesthesia Type: MAC    Vitals Value Taken Time   /57 04/28/25 14:20   Temp 36 04/28/25 14:20   Pulse 85 04/28/25 14:20   Resp 20 04/28/25 14:20   SpO2 100 04/28/25 14:20       Anesthesia Post Evaluation    Patient location during evaluation: bedside  Patient participation: complete - patient participated  Level of consciousness: awake and alert  Pain management: adequate  Airway patency: patent  Cardiovascular status: acceptable  Respiratory status: acceptable  Hydration status: acceptable  Postoperative Nausea and Vomiting: none        No notable events documented.

## 2025-04-29 LAB
ANION GAP SERPL CALC-SCNC: 9 MMOL/L (ref 10–20)
BLOOD EXPIRATION DATE: NORMAL
BUN SERPL-MCNC: 7 MG/DL (ref 6–23)
CALCIUM SERPL-MCNC: 8.9 MG/DL (ref 8.6–10.3)
CHLORIDE SERPL-SCNC: 110 MMOL/L (ref 98–107)
CO2 SERPL-SCNC: 24 MMOL/L (ref 21–32)
CREAT SERPL-MCNC: 0.53 MG/DL (ref 0.5–1.05)
DISPENSE STATUS: NORMAL
EGFRCR SERPLBLD CKD-EPI 2021: >90 ML/MIN/1.73M*2
ERYTHROCYTE [DISTWIDTH] IN BLOOD BY AUTOMATED COUNT: 19.6 % (ref 11.5–14.5)
GLUCOSE BLD MANUAL STRIP-MCNC: 108 MG/DL (ref 74–99)
GLUCOSE BLD MANUAL STRIP-MCNC: 137 MG/DL (ref 74–99)
GLUCOSE BLD MANUAL STRIP-MCNC: 223 MG/DL (ref 74–99)
GLUCOSE BLD MANUAL STRIP-MCNC: 96 MG/DL (ref 74–99)
GLUCOSE SERPL-MCNC: 98 MG/DL (ref 74–99)
HCT VFR BLD AUTO: 24.3 % (ref 36–46)
HGB BLD-MCNC: 7.4 G/DL (ref 12–16)
HOLD SPECIMEN: NORMAL
MCH RBC QN AUTO: 22.8 PG (ref 26–34)
MCHC RBC AUTO-ENTMCNC: 30.5 G/DL (ref 32–36)
MCV RBC AUTO: 75 FL (ref 80–100)
NRBC BLD-RTO: 0 /100 WBCS (ref 0–0)
PLATELET # BLD AUTO: 201 X10*3/UL (ref 150–450)
POTASSIUM SERPL-SCNC: 3.9 MMOL/L (ref 3.5–5.3)
PRODUCT BLOOD TYPE: 600
PRODUCT CODE: NORMAL
RBC # BLD AUTO: 3.24 X10*6/UL (ref 4–5.2)
SODIUM SERPL-SCNC: 139 MMOL/L (ref 136–145)
UNIT ABO: NORMAL
UNIT NUMBER: NORMAL
UNIT RH: NORMAL
UNIT VOLUME: 350
WBC # BLD AUTO: 7.5 X10*3/UL (ref 4.4–11.3)
XM INTEP: NORMAL

## 2025-04-29 PROCEDURE — 2500000004 HC RX 250 GENERAL PHARMACY W/ HCPCS (ALT 636 FOR OP/ED): Mod: JZ | Performed by: STUDENT IN AN ORGANIZED HEALTH CARE EDUCATION/TRAINING PROGRAM

## 2025-04-29 PROCEDURE — 36415 COLL VENOUS BLD VENIPUNCTURE: CPT | Performed by: STUDENT IN AN ORGANIZED HEALTH CARE EDUCATION/TRAINING PROGRAM

## 2025-04-29 PROCEDURE — 99232 SBSQ HOSP IP/OBS MODERATE 35: CPT | Performed by: PHYSICIAN ASSISTANT

## 2025-04-29 PROCEDURE — 82947 ASSAY GLUCOSE BLOOD QUANT: CPT

## 2025-04-29 PROCEDURE — 2500000005 HC RX 250 GENERAL PHARMACY W/O HCPCS: Performed by: STUDENT IN AN ORGANIZED HEALTH CARE EDUCATION/TRAINING PROGRAM

## 2025-04-29 PROCEDURE — 2500000001 HC RX 250 WO HCPCS SELF ADMINISTERED DRUGS (ALT 637 FOR MEDICARE OP): Performed by: STUDENT IN AN ORGANIZED HEALTH CARE EDUCATION/TRAINING PROGRAM

## 2025-04-29 PROCEDURE — 99232 SBSQ HOSP IP/OBS MODERATE 35: CPT | Performed by: STUDENT IN AN ORGANIZED HEALTH CARE EDUCATION/TRAINING PROGRAM

## 2025-04-29 PROCEDURE — 1200000002 HC GENERAL ROOM WITH TELEMETRY DAILY

## 2025-04-29 PROCEDURE — 2500000002 HC RX 250 W HCPCS SELF ADMINISTERED DRUGS (ALT 637 FOR MEDICARE OP, ALT 636 FOR OP/ED): Performed by: STUDENT IN AN ORGANIZED HEALTH CARE EDUCATION/TRAINING PROGRAM

## 2025-04-29 PROCEDURE — 80048 BASIC METABOLIC PNL TOTAL CA: CPT | Performed by: STUDENT IN AN ORGANIZED HEALTH CARE EDUCATION/TRAINING PROGRAM

## 2025-04-29 PROCEDURE — 85027 COMPLETE CBC AUTOMATED: CPT | Performed by: STUDENT IN AN ORGANIZED HEALTH CARE EDUCATION/TRAINING PROGRAM

## 2025-04-29 RX ORDER — CLOPIDOGREL BISULFATE 75 MG/1
75 TABLET ORAL DAILY
Status: DISCONTINUED | OUTPATIENT
Start: 2025-04-29 | End: 2025-05-01 | Stop reason: HOSPADM

## 2025-04-29 RX ORDER — OXYCODONE AND ACETAMINOPHEN 5; 325 MG/1; MG/1
1 TABLET ORAL EVERY 4 HOURS PRN
Refills: 0 | Status: DISCONTINUED | OUTPATIENT
Start: 2025-04-29 | End: 2025-05-01 | Stop reason: HOSPADM

## 2025-04-29 RX ADMIN — OXYCODONE HYDROCHLORIDE AND ACETAMINOPHEN 1 TABLET: 5; 325 TABLET ORAL at 16:01

## 2025-04-29 RX ADMIN — PANTOPRAZOLE SODIUM 40 MG: 40 INJECTION, POWDER, FOR SOLUTION INTRAVENOUS at 20:24

## 2025-04-29 RX ADMIN — SODIUM CHLORIDE, SODIUM LACTATE, POTASSIUM CHLORIDE, AND CALCIUM CHLORIDE 75 ML/HR: .6; .31; .03; .02 INJECTION, SOLUTION INTRAVENOUS at 09:02

## 2025-04-29 RX ADMIN — HYDROMORPHONE HYDROCHLORIDE 0.6 MG: 1 INJECTION, SOLUTION INTRAMUSCULAR; INTRAVENOUS; SUBCUTANEOUS at 13:34

## 2025-04-29 RX ADMIN — HYDROMORPHONE HYDROCHLORIDE 0.6 MG: 1 INJECTION, SOLUTION INTRAMUSCULAR; INTRAVENOUS; SUBCUTANEOUS at 00:23

## 2025-04-29 RX ADMIN — EZETIMIBE 10 MG: 10 TABLET ORAL at 08:56

## 2025-04-29 RX ADMIN — CLOPIDOGREL BISULFATE 75 MG: 75 TABLET, FILM COATED ORAL at 16:01

## 2025-04-29 RX ADMIN — PANTOPRAZOLE SODIUM 40 MG: 40 INJECTION, POWDER, FOR SOLUTION INTRAVENOUS at 08:56

## 2025-04-29 RX ADMIN — HYDROMORPHONE HYDROCHLORIDE 0.6 MG: 1 INJECTION, SOLUTION INTRAMUSCULAR; INTRAVENOUS; SUBCUTANEOUS at 09:43

## 2025-04-29 RX ADMIN — HYDROMORPHONE HYDROCHLORIDE 0.6 MG: 1 INJECTION, SOLUTION INTRAMUSCULAR; INTRAVENOUS; SUBCUTANEOUS at 05:37

## 2025-04-29 RX ADMIN — Medication 3 MG: at 22:50

## 2025-04-29 RX ADMIN — HYDROMORPHONE HYDROCHLORIDE 0.6 MG: 1 INJECTION, SOLUTION INTRAMUSCULAR; INTRAVENOUS; SUBCUTANEOUS at 18:46

## 2025-04-29 RX ADMIN — OXYCODONE HYDROCHLORIDE AND ACETAMINOPHEN 1 TABLET: 5; 325 TABLET ORAL at 20:24

## 2025-04-29 RX ADMIN — ATORVASTATIN CALCIUM 80 MG: 80 TABLET, FILM COATED ORAL at 20:24

## 2025-04-29 RX ADMIN — HYDROMORPHONE HYDROCHLORIDE 0.6 MG: 1 INJECTION, SOLUTION INTRAMUSCULAR; INTRAVENOUS; SUBCUTANEOUS at 22:50

## 2025-04-29 RX ADMIN — ONDANSETRON 4 MG: 4 TABLET, FILM COATED ORAL at 00:26

## 2025-04-29 SDOH — ECONOMIC STABILITY: FOOD INSECURITY: WITHIN THE PAST 12 MONTHS, THE FOOD YOU BOUGHT JUST DIDN'T LAST AND YOU DIDN'T HAVE MONEY TO GET MORE.: NEVER TRUE

## 2025-04-29 SDOH — ECONOMIC STABILITY: FOOD INSECURITY: HOW HARD IS IT FOR YOU TO PAY FOR THE VERY BASICS LIKE FOOD, HOUSING, MEDICAL CARE, AND HEATING?: NOT VERY HARD

## 2025-04-29 SDOH — SOCIAL STABILITY: SOCIAL INSECURITY: WITHIN THE LAST YEAR, HAVE YOU BEEN AFRAID OF YOUR PARTNER OR EX-PARTNER?: NO

## 2025-04-29 SDOH — SOCIAL STABILITY: SOCIAL INSECURITY
WITHIN THE LAST YEAR, HAVE YOU BEEN RAPED OR FORCED TO HAVE ANY KIND OF SEXUAL ACTIVITY BY YOUR PARTNER OR EX-PARTNER?: NO

## 2025-04-29 SDOH — ECONOMIC STABILITY: HOUSING INSECURITY: IN THE LAST 12 MONTHS, WAS THERE A TIME WHEN YOU WERE NOT ABLE TO PAY THE MORTGAGE OR RENT ON TIME?: NO

## 2025-04-29 SDOH — ECONOMIC STABILITY: INCOME INSECURITY: IN THE PAST 12 MONTHS HAS THE ELECTRIC, GAS, OIL, OR WATER COMPANY THREATENED TO SHUT OFF SERVICES IN YOUR HOME?: NO

## 2025-04-29 SDOH — ECONOMIC STABILITY: TRANSPORTATION INSECURITY: IN THE PAST 12 MONTHS, HAS LACK OF TRANSPORTATION KEPT YOU FROM MEDICAL APPOINTMENTS OR FROM GETTING MEDICATIONS?: NO

## 2025-04-29 SDOH — SOCIAL STABILITY: SOCIAL INSECURITY: WITHIN THE LAST YEAR, HAVE YOU BEEN HUMILIATED OR EMOTIONALLY ABUSED IN OTHER WAYS BY YOUR PARTNER OR EX-PARTNER?: NO

## 2025-04-29 SDOH — ECONOMIC STABILITY: FOOD INSECURITY: WITHIN THE PAST 12 MONTHS, YOU WORRIED THAT YOUR FOOD WOULD RUN OUT BEFORE YOU GOT THE MONEY TO BUY MORE.: NEVER TRUE

## 2025-04-29 SDOH — ECONOMIC STABILITY: HOUSING INSECURITY: IN THE PAST 12 MONTHS, HOW MANY TIMES HAVE YOU MOVED WHERE YOU WERE LIVING?: 0

## 2025-04-29 SDOH — ECONOMIC STABILITY: HOUSING INSECURITY: AT ANY TIME IN THE PAST 12 MONTHS, WERE YOU HOMELESS OR LIVING IN A SHELTER (INCLUDING NOW)?: NO

## 2025-04-29 ASSESSMENT — PAIN - FUNCTIONAL ASSESSMENT
PAIN_FUNCTIONAL_ASSESSMENT: 0-10

## 2025-04-29 ASSESSMENT — COGNITIVE AND FUNCTIONAL STATUS - GENERAL
DAILY ACTIVITIY SCORE: 24
MOBILITY SCORE: 24
DAILY ACTIVITIY SCORE: 24
MOBILITY SCORE: 24

## 2025-04-29 ASSESSMENT — PAIN DESCRIPTION - LOCATION
LOCATION: BACK
LOCATION: LEG

## 2025-04-29 ASSESSMENT — PAIN SCALES - GENERAL
PAINLEVEL_OUTOF10: 6
PAINLEVEL_OUTOF10: 7
PAINLEVEL_OUTOF10: 3
PAINLEVEL_OUTOF10: 7
PAINLEVEL_OUTOF10: 8
PAINLEVEL_OUTOF10: 6
PAINLEVEL_OUTOF10: 7
PAINLEVEL_OUTOF10: 9
PAINLEVEL_OUTOF10: 8
PAINLEVEL_OUTOF10: 7

## 2025-04-29 ASSESSMENT — ACTIVITIES OF DAILY LIVING (ADL): LACK_OF_TRANSPORTATION: NO

## 2025-04-29 ASSESSMENT — PAIN DESCRIPTION - ORIENTATION: ORIENTATION: RIGHT

## 2025-04-29 NOTE — CARE PLAN
The patient's goals for the shift include rest    The clinical goals for the shift include hemodynamically stable

## 2025-04-29 NOTE — CARE PLAN
The patient's goals for the shift include rest    The clinical goals for the shift include safety, monitor for bleeding, and update with plan of care

## 2025-04-29 NOTE — PROGRESS NOTES
Cuca Laguerre is a 64 y.o. female on day 2 of admission presenting with Upper GI hemorrhage.    Subjective   Patient had no acute events overnight.  Denied any episodes of hematemesis or dark/bloody stools.  Denies any lightness or dizziness.  Denied any chest pain.  Endorsing pain in her back which is chronic in nature.  Further ROS was unremarkable.    Objective     Physical Exam  General: Well-developed adult female in mild distress  HEENT: Clear sclera, EOMI, trachea midline, moist mucous membranes  Respiratory: Equal chest rise, no retractions  Abdomen: Soft, nontender, nondistended  Extremities: No cyanosis or clubbing appreciated  Neurological: Spontaneously moves all extremities, no dysarthria, cranial nerves grossly intact  Psychiatric: Appropriate mood and affect  Skin: Warm, dry    Last Recorded Vitals  Blood pressure 116/57, pulse 87, temperature 36 °C (96.8 °F), temperature source Temporal, resp. rate 17, height 1.524 m (5'), weight 73.4 kg (161 lb 13.1 oz), SpO2 96%.  Intake/Output last 3 Shifts:  I/O last 3 completed shifts:  In: 1966.3 (26.8 mL/kg) [I.V.:121.3 (1.7 mL/kg); Blood:345; IV Piggyback:1500]  Out: - (0 mL/kg)   Weight: 73.4 kg     Relevant Results                              Assessment & Plan  Upper GI hemorrhage    Acute blood loss anemia    CAD S/P percutaneous coronary angioplasty    Essential hypertension, benign    Iron deficiency anemia    Urothelial carcinoma    History of arteriovenous malformation    SIRS (systemic inflammatory response syndrome) (Multi)    64 y.o. female with a history of prior GI bleed secondary to small bowel AVM, CAD s/p PTCA on Plavix, essential hypertension, iron deficiency anemia, urothelial carcinoma with recent recurrence now s/p TURBT (4/4/25) presenting with acute blood loss anemia secondary to upper GI bleed And SIRS    Patient has a history of AVM and previous GI bleed  Diet escalated to regular  Continue Protonix IV twice daily  Will resume home  Plavix and monitor hemoglobin  Iron studies showed iron deficiency anemia, outpatient follow-up with hematology for iron infusion  Avoid NSAIDs  GI followed while in house.  Took patient for EGD with push enteroscopy on 4/28 which revealed 2 angioectasias in the jejunum s/p APC and edematous/erythematous mucosa with erosions in the stomach s/p biopsy. PPI BID advised. Continued hematology follow-up advised. 2 week follow-up advised. Capsule endoscopy to be considered on the outpatient basis.   Cont tele monitorijng  Blood pressure remains stable. Patient's home lisinopril, metoprolol, and aldactone remain on hold. Will consider discontinuation on DC should pressures remain to dip to the 90's-low 100's systolic during the day.   Outpatient follow-up with urology for urothelial carcinoma she is status post TURBT on 4/4/2025  DVT prophylaxis            Danny Marks MD

## 2025-04-29 NOTE — PROGRESS NOTES
Department of Internal Medicine  Gastroenterology  Progress note      Subjective  GI is following for melena    Today, she endorses feeling hungry and is ready to advance her diet. She denies nausea, vomiting, or abdominal pain. She has not had a bowel movement since the procedure.       Current Medication  Current Medications[1]    Past Medical History  Active Ambulatory Problems     Diagnosis Date Noted    Anemia 10/19/2023    Acute blood loss anemia 10/19/2023    Anxiety 10/19/2023    Depressive disorder 05/18/2012    AVM (arteriovenous malformation) of small bowel, acquired 10/19/2023    Biceps tendon tear 10/19/2023    Bilateral leg weakness 10/19/2023    CAD S/P percutaneous coronary angioplasty 10/19/2023    Carotid artery bruit 10/19/2023    Chest pain 10/19/2023    Chronic venous insufficiency 10/19/2023    Cubital tunnel syndrome 10/19/2023    DDD (degenerative disc disease), lumbar 05/20/2015    Major depression 10/19/2023    Diabetes mellitus (Multi) 10/19/2023    Edema 10/19/2023    Elevated liver enzymes 10/19/2023    Essential hypertension, benign 05/18/2012    Fibromyalgia 10/19/2023    Generalized arthritis 10/19/2023    H/O colonoscopy 10/19/2023    Hand pain 10/19/2023    Heart disease 10/19/2023    History of bladder cancer 07/17/2023    Hematoma of groin 10/19/2023    History of myocardial infarction 10/19/2023    Iliotibial band syndrome 10/19/2023    Adhesive capsulitis of left shoulder 10/19/2023    Impingement syndrome of left shoulder 10/19/2023    Insomnia 10/19/2023    Low back pain 05/20/2015    Iron deficiency anemia due to dietary causes 01/22/2019    Iron deficiency anemia 10/19/2023    Low hemoglobin 10/19/2023    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[facility]) 02/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    Hypercholesterolemia 10/19/2023    Mixed hyperlipidemia 10/19/2023    Muscle spasm 10/19/2023    Numbness and tingling 10/19/2023    Obese 10/19/2023    Severe obesity (BMI 35.0-39.9) with comorbidity (Multi) 04/18/2023    Other  acquired hemolytic anemias 01/22/2019    Pain 10/19/2023    Palpitations 10/19/2023    Rotator cuff tear 10/19/2023    Stiffness of left shoulder, not elsewhere classified 10/19/2023    Superior glenoid labrum lesion of shoulder 10/19/2023    Swelling of shoulder joint 10/19/2023    Tear of left biceps muscle 10/19/2023    Type 2 diabetes mellitus without complication, without long-term current use of insulin 10/04/2011    Ulnar neuropathy 05/20/2015    Urothelial carcinoma 04/18/2023    Vitamin D deficiency 10/19/2023    Wound drainage 10/19/2023    Class 1 obesity with body mass index (BMI) of 32.0 to 32.9 in adult 10/19/2023    H/O non-ST elevation myocardial infarction (NSTEMI) 10/19/2023    Perioperative pain 10/19/2023    BMI 30.0-30.9,adult 11/03/2023    Former cigarette smoker 11/03/2023    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    Urinary frequency 09/26/2024    GI bleed 09/28/2024     Resolved Ambulatory Problems     Diagnosis Date Noted    Coronary artery disease with angina pectoris 10/19/2023     Past Medical History:   Diagnosis Date    Bladder cancer (Multi)     Chronic pain disorder     Coronary artery disease     Fibromyalgia, primary     Hyperlipidemia     Hypertension     Lumbar disc disease     Myocardial infarction (Multi)     PONV (postoperative nausea and vomiting)        PHYSICAL EXAM  VS: /62 (BP Location: Left arm, Patient Position: Lying)   Pulse 73   Temp 37.3 °C (99.1 °F) (Temporal)   Resp 16   Ht 1.524 m (5')   Wt 73.4 kg (161 lb 13.1 oz)   SpO2 99%   BMI 31.60 kg/m²  Body mass index is 31.6 kg/m².  Physical Exam  Constitutional:       General: She is not in acute distress.     Appearance: Normal appearance.   HENT:      Head: Normocephalic and atraumatic.      Mouth/Throat:      Mouth: Mucous membranes are moist.      Pharynx: Oropharynx is clear.   Eyes:      General: No scleral icterus.     Extraocular Movements: Extraocular movements intact.       Conjunctiva/sclera: Conjunctivae normal.      Pupils: Pupils are equal, round, and reactive to light.   Cardiovascular:      Rate and Rhythm: Normal rate and regular rhythm.      Heart sounds: No murmur heard.  Pulmonary:      Effort: Pulmonary effort is normal.      Breath sounds: No wheezing or rhonchi.   Abdominal:      General: Bowel sounds are normal. There is no distension.      Palpations: Abdomen is soft. There is no mass.      Tenderness: There is no abdominal tenderness.      Hernia: No hernia is present.   Musculoskeletal:         General: No swelling or deformity.   Skin:     General: Skin is warm.      Coloration: Skin is not jaundiced.   Neurological:      General: No focal deficit present.      Mental Status: She is alert and oriented to person, place, and time.   Psychiatric:         Mood and Affect: Mood normal.          DATA  Recent blood work and endoscopic studies were reviewed and discussed with the patient   Results from last 7 days   Lab Units 04/29/25  0605   WBC AUTO x10*3/uL 7.5   RBC AUTO x10*6/uL 3.24*   HEMOGLOBIN g/dL 7.4*   HEMATOCRIT % 24.3*   MCV fL 75*   MCHC g/dL 30.5*   RDW % 19.6*   PLATELETS AUTO x10*3/uL 201       Results from last 72 hours   Lab Units 04/29/25  0605 04/28/25  0535 04/27/25 2008   SODIUM mmol/L 139   < > 134*   POTASSIUM mmol/L 3.9   < > 3.7   CHLORIDE mmol/L 110*   < > 107   CO2 mmol/L 24   < > 18*   BUN mg/dL 7   < > 29*   CREATININE mg/dL 0.53   < > 0.84   CALCIUM mg/dL 8.9   < > 9.1   PROTEIN TOTAL g/dL  --   --  6.9   BILIRUBIN TOTAL mg/dL  --   --  0.2   ALK PHOS U/L  --   --  72   AST U/L  --   --  16   ALT U/L  --   --  18    < > = values in this interval not displayed.       Results from last 72 hours   Lab Units 04/27/25 2008   INR  1.0       Results from last 72 hours   Lab Units 04/27/25 2008   LIPASE U/L 143*       RADIOLOGY REVIEW  NA    ENDOSCOPIC REVIEW  Enteroscopy by Dr Díaz 4/28/25  2 angioectasias in the jejunum s/p APC (small bowel  settings).  The duodenum appeared normal.  Edematous, erythematous mucosa with erosions in the stomach s/p biopsies.   Small hiatal hernia (Hill grade II).   Normal esophagus.     IMPRESSION/RECOMMENDATIONS  Cuca Laguerre is a 64 y.o. female with a PMH of CAD s/p stents on Plavix, HTN, ROHIT, small bowel AVM, urothelial carcinoma with recent recurrence now s/p TURBT 4/4/2025 presents to [facility] with chief complaint of dark stools x 1 week associated with dyspnea on exertion, dizziness and chest pain.      Symptomatic microcytic anemia Hgb(4/29) 7.4.  Baseline Hgb 9.0.    Iron indices indicate ROHIT.   Reported melena  History of small bowel AVMs, history of ROHIT (follows Dr. Khan)  Antiplatelet therapy with Plavix last dose 4/26/2025    PLAN  -Consider outpatient capsule to rule out other AVMs   -Risks and benefits of restarting antiplatelet therapy should be discussed given likelihood of AVMs returning. No contraindication from a GI standpoint  -Currently on pantoprazole 40 mg BID, okay for once daily dosing  -Continue iron supplementation per Dr Khan  -Currently on a general diet  -Continue supportive care per primary team    Discussed with Dr Perez, GI to sign off, please call with questions or concerns      (Electronically signed byLaureen Browning PA-C on 4/29/2025 at 8:33 AM)         [1]   Current Facility-Administered Medications:     acetaminophen (Tylenol) tablet 650 mg, 650 mg, oral, q4h PRN, 650 mg at 04/28/25 0815 **OR** acetaminophen (Tylenol) oral liquid 650 mg, 650 mg, nasogastric tube, q4h PRN **OR** acetaminophen (Tylenol) suppository 650 mg, 650 mg, rectal, q4h PRN, Tim Pederson MD    atorvastatin (Lipitor) tablet 80 mg, 80 mg, oral, Nightly, Tim Pederson MD, 80 mg at 04/28/25 2012    [Held by provider] clopidogrel (Plavix) tablet 75 mg, 75 mg, oral, Daily, Tim Pederson MD    ezetimibe (Zetia) tablet 10 mg, 10 mg, oral, Daily, Tim Pederson MD, 10 mg at 04/28/25 0815     HYDROmorphone (Dilaudid) injection 0.6 mg, 0.6 mg, intravenous, q4h PRN, Tim Pederson MD, 0.6 mg at 04/29/25 0537    lactated Ringer's infusion, 75 mL/hr, intravenous, Continuous, Denny Alcantar MD, Last Rate: 75 mL/hr at 04/28/25 1213, Restarted at 04/28/25 1350    melatonin tablet 3 mg, 3 mg, oral, Nightly PRN, Tim Pederson MD    ondansetron (Zofran) tablet 4 mg, 4 mg, oral, q8h PRN, 4 mg at 04/29/25 0026 **OR** ondansetron (Zofran) injection 4 mg, 4 mg, intravenous, q8h PRN, Tim Pederson MD    pantoprazole (Protonix) injection 40 mg, 40 mg, intravenous, BID, Tim Pederson MD, 40 mg at 04/28/25 2012    polyethylene glycol (Glycolax, Miralax) packet 17 g, 17 g, oral, Daily PRN, Tim Pederson MD

## 2025-04-30 ENCOUNTER — APPOINTMENT (OUTPATIENT)
Dept: RADIOLOGY | Facility: HOSPITAL | Age: 65
End: 2025-04-30
Payer: MEDICARE

## 2025-04-30 LAB
ANION GAP SERPL CALC-SCNC: 9 MMOL/L (ref 10–20)
BLOOD EXPIRATION DATE: NORMAL
BUN SERPL-MCNC: 9 MG/DL (ref 6–23)
CALCIUM SERPL-MCNC: 8.3 MG/DL (ref 8.6–10.3)
CHLORIDE SERPL-SCNC: 110 MMOL/L (ref 98–107)
CO2 SERPL-SCNC: 26 MMOL/L (ref 21–32)
CREAT SERPL-MCNC: 0.59 MG/DL (ref 0.5–1.05)
DISPENSE STATUS: NORMAL
EGFRCR SERPLBLD CKD-EPI 2021: >90 ML/MIN/1.73M*2
ERYTHROCYTE [DISTWIDTH] IN BLOOD BY AUTOMATED COUNT: 19.1 % (ref 11.5–14.5)
ERYTHROCYTE [DISTWIDTH] IN BLOOD BY AUTOMATED COUNT: 20.4 % (ref 11.5–14.5)
GLUCOSE BLD MANUAL STRIP-MCNC: 118 MG/DL (ref 74–99)
GLUCOSE BLD MANUAL STRIP-MCNC: 120 MG/DL (ref 74–99)
GLUCOSE BLD MANUAL STRIP-MCNC: 164 MG/DL (ref 74–99)
GLUCOSE BLD MANUAL STRIP-MCNC: 232 MG/DL (ref 74–99)
GLUCOSE SERPL-MCNC: 124 MG/DL (ref 74–99)
HCT VFR BLD AUTO: 21.8 % (ref 36–46)
HCT VFR BLD AUTO: 28.3 % (ref 36–46)
HGB BLD-MCNC: 6.7 G/DL (ref 12–16)
HGB BLD-MCNC: 9 G/DL (ref 12–16)
MCH RBC QN AUTO: 23.3 PG (ref 26–34)
MCH RBC QN AUTO: 24.5 PG (ref 26–34)
MCHC RBC AUTO-ENTMCNC: 30.7 G/DL (ref 32–36)
MCHC RBC AUTO-ENTMCNC: 31.8 G/DL (ref 32–36)
MCV RBC AUTO: 76 FL (ref 80–100)
MCV RBC AUTO: 77 FL (ref 80–100)
NRBC BLD-RTO: 0 /100 WBCS (ref 0–0)
NRBC BLD-RTO: 0 /100 WBCS (ref 0–0)
PLATELET # BLD AUTO: 177 X10*3/UL (ref 150–450)
PLATELET # BLD AUTO: 189 X10*3/UL (ref 150–450)
POTASSIUM SERPL-SCNC: 3.5 MMOL/L (ref 3.5–5.3)
PRODUCT BLOOD TYPE: 600
PRODUCT CODE: NORMAL
RBC # BLD AUTO: 2.87 X10*6/UL (ref 4–5.2)
RBC # BLD AUTO: 3.68 X10*6/UL (ref 4–5.2)
SODIUM SERPL-SCNC: 141 MMOL/L (ref 136–145)
UNIT ABO: NORMAL
UNIT NUMBER: NORMAL
UNIT RH: NORMAL
UNIT VOLUME: 350
WBC # BLD AUTO: 5.1 X10*3/UL (ref 4.4–11.3)
WBC # BLD AUTO: 8.1 X10*3/UL (ref 4.4–11.3)
XM INTEP: NORMAL

## 2025-04-30 PROCEDURE — 85027 COMPLETE CBC AUTOMATED: CPT | Performed by: STUDENT IN AN ORGANIZED HEALTH CARE EDUCATION/TRAINING PROGRAM

## 2025-04-30 PROCEDURE — 93971 EXTREMITY STUDY: CPT | Performed by: RADIOLOGY

## 2025-04-30 PROCEDURE — 2500000004 HC RX 250 GENERAL PHARMACY W/ HCPCS (ALT 636 FOR OP/ED): Performed by: STUDENT IN AN ORGANIZED HEALTH CARE EDUCATION/TRAINING PROGRAM

## 2025-04-30 PROCEDURE — 2500000002 HC RX 250 W HCPCS SELF ADMINISTERED DRUGS (ALT 637 FOR MEDICARE OP, ALT 636 FOR OP/ED): Performed by: STUDENT IN AN ORGANIZED HEALTH CARE EDUCATION/TRAINING PROGRAM

## 2025-04-30 PROCEDURE — 80048 BASIC METABOLIC PNL TOTAL CA: CPT | Performed by: STUDENT IN AN ORGANIZED HEALTH CARE EDUCATION/TRAINING PROGRAM

## 2025-04-30 PROCEDURE — P9040 RBC LEUKOREDUCED IRRADIATED: HCPCS

## 2025-04-30 PROCEDURE — 99232 SBSQ HOSP IP/OBS MODERATE 35: CPT | Performed by: STUDENT IN AN ORGANIZED HEALTH CARE EDUCATION/TRAINING PROGRAM

## 2025-04-30 PROCEDURE — 93970 EXTREMITY STUDY: CPT

## 2025-04-30 PROCEDURE — 36415 COLL VENOUS BLD VENIPUNCTURE: CPT | Performed by: STUDENT IN AN ORGANIZED HEALTH CARE EDUCATION/TRAINING PROGRAM

## 2025-04-30 PROCEDURE — 1200000002 HC GENERAL ROOM WITH TELEMETRY DAILY

## 2025-04-30 PROCEDURE — 2500000005 HC RX 250 GENERAL PHARMACY W/O HCPCS: Performed by: STUDENT IN AN ORGANIZED HEALTH CARE EDUCATION/TRAINING PROGRAM

## 2025-04-30 PROCEDURE — 2500000001 HC RX 250 WO HCPCS SELF ADMINISTERED DRUGS (ALT 637 FOR MEDICARE OP): Performed by: STUDENT IN AN ORGANIZED HEALTH CARE EDUCATION/TRAINING PROGRAM

## 2025-04-30 PROCEDURE — 82947 ASSAY GLUCOSE BLOOD QUANT: CPT

## 2025-04-30 PROCEDURE — 36430 TRANSFUSION BLD/BLD COMPNT: CPT

## 2025-04-30 RX ORDER — PANTOPRAZOLE SODIUM 40 MG/1
40 TABLET, DELAYED RELEASE ORAL
Status: DISCONTINUED | OUTPATIENT
Start: 2025-04-30 | End: 2025-05-01 | Stop reason: HOSPADM

## 2025-04-30 RX ORDER — POTASSIUM CHLORIDE 20 MEQ/1
20 TABLET, EXTENDED RELEASE ORAL ONCE
Status: COMPLETED | OUTPATIENT
Start: 2025-04-30 | End: 2025-04-30

## 2025-04-30 RX ORDER — DICLOFENAC SODIUM 10 MG/G
4 GEL TOPICAL 4 TIMES DAILY PRN
Status: DISCONTINUED | OUTPATIENT
Start: 2025-04-30 | End: 2025-05-01 | Stop reason: HOSPADM

## 2025-04-30 RX ORDER — FUROSEMIDE 10 MG/ML
20 INJECTION INTRAMUSCULAR; INTRAVENOUS ONCE
Status: COMPLETED | OUTPATIENT
Start: 2025-04-30 | End: 2025-04-30

## 2025-04-30 RX ADMIN — OXYCODONE HYDROCHLORIDE AND ACETAMINOPHEN 1 TABLET: 5; 325 TABLET ORAL at 00:12

## 2025-04-30 RX ADMIN — PANTOPRAZOLE SODIUM 40 MG: 40 TABLET, DELAYED RELEASE ORAL at 15:10

## 2025-04-30 RX ADMIN — OXYCODONE HYDROCHLORIDE AND ACETAMINOPHEN 1 TABLET: 5; 325 TABLET ORAL at 10:23

## 2025-04-30 RX ADMIN — HYDROMORPHONE HYDROCHLORIDE 0.6 MG: 1 INJECTION, SOLUTION INTRAMUSCULAR; INTRAVENOUS; SUBCUTANEOUS at 12:40

## 2025-04-30 RX ADMIN — DICLOFENAC SODIUM 4 G: 10 GEL TOPICAL at 18:54

## 2025-04-30 RX ADMIN — OXYCODONE HYDROCHLORIDE AND ACETAMINOPHEN 1 TABLET: 5; 325 TABLET ORAL at 15:10

## 2025-04-30 RX ADMIN — HYDROMORPHONE HYDROCHLORIDE 0.6 MG: 1 INJECTION, SOLUTION INTRAMUSCULAR; INTRAVENOUS; SUBCUTANEOUS at 08:27

## 2025-04-30 RX ADMIN — CLOPIDOGREL BISULFATE 75 MG: 75 TABLET, FILM COATED ORAL at 08:29

## 2025-04-30 RX ADMIN — POLYETHYLENE GLYCOL 3350 17 G: 17 POWDER, FOR SOLUTION ORAL at 08:44

## 2025-04-30 RX ADMIN — OXYCODONE HYDROCHLORIDE AND ACETAMINOPHEN 1 TABLET: 5; 325 TABLET ORAL at 22:54

## 2025-04-30 RX ADMIN — EZETIMIBE 10 MG: 10 TABLET ORAL at 08:28

## 2025-04-30 RX ADMIN — OXYCODONE HYDROCHLORIDE AND ACETAMINOPHEN 1 TABLET: 5; 325 TABLET ORAL at 06:11

## 2025-04-30 RX ADMIN — ATORVASTATIN CALCIUM 80 MG: 80 TABLET, FILM COATED ORAL at 20:07

## 2025-04-30 RX ADMIN — OXYCODONE HYDROCHLORIDE AND ACETAMINOPHEN 1 TABLET: 5; 325 TABLET ORAL at 18:54

## 2025-04-30 RX ADMIN — Medication 3 MG: at 20:07

## 2025-04-30 RX ADMIN — HYDROMORPHONE HYDROCHLORIDE 0.6 MG: 1 INJECTION, SOLUTION INTRAMUSCULAR; INTRAVENOUS; SUBCUTANEOUS at 03:21

## 2025-04-30 RX ADMIN — HYDROMORPHONE HYDROCHLORIDE 0.6 MG: 1 INJECTION, SOLUTION INTRAMUSCULAR; INTRAVENOUS; SUBCUTANEOUS at 16:45

## 2025-04-30 RX ADMIN — HYDROMORPHONE HYDROCHLORIDE 0.6 MG: 1 INJECTION, SOLUTION INTRAMUSCULAR; INTRAVENOUS; SUBCUTANEOUS at 20:07

## 2025-04-30 RX ADMIN — POTASSIUM CHLORIDE 20 MEQ: 1500 TABLET, EXTENDED RELEASE ORAL at 10:23

## 2025-04-30 RX ADMIN — FUROSEMIDE 20 MG: 10 INJECTION, SOLUTION INTRAMUSCULAR; INTRAVENOUS at 10:23

## 2025-04-30 ASSESSMENT — PAIN SCALES - GENERAL
PAINLEVEL_OUTOF10: 8
PAINLEVEL_OUTOF10: 7
PAINLEVEL_OUTOF10: 8
PAINLEVEL_OUTOF10: 7
PAINLEVEL_OUTOF10: 7
PAINLEVEL_OUTOF10: 8
PAINLEVEL_OUTOF10: 7
PAINLEVEL_OUTOF10: 3
PAINLEVEL_OUTOF10: 2
PAINLEVEL_OUTOF10: 7

## 2025-04-30 ASSESSMENT — COGNITIVE AND FUNCTIONAL STATUS - GENERAL
DAILY ACTIVITIY SCORE: 24
MOBILITY SCORE: 24
DAILY ACTIVITIY SCORE: 24
MOBILITY SCORE: 24

## 2025-04-30 ASSESSMENT — PAIN DESCRIPTION - ORIENTATION
ORIENTATION: LOWER
ORIENTATION: MID;LOWER
ORIENTATION: LOWER

## 2025-04-30 ASSESSMENT — PAIN DESCRIPTION - DESCRIPTORS
DESCRIPTORS: ACHING
DESCRIPTORS: ACHING;DISCOMFORT
DESCRIPTORS: ACHING;DISCOMFORT
DESCRIPTORS: ACHING
DESCRIPTORS: ACHING;DISCOMFORT

## 2025-04-30 ASSESSMENT — PAIN DESCRIPTION - LOCATION
LOCATION: BACK

## 2025-04-30 NOTE — CARE PLAN
The patient's goals for the shift include rest    The clinical goals for the shift include paain control

## 2025-04-30 NOTE — CARE PLAN
The patient's goals for the shift include rest    The clinical goals for the shift include pain management

## 2025-04-30 NOTE — PROGRESS NOTES
Cuca Laguerre is a 64 y.o. female on day 3 of admission presenting with Upper GI hemorrhage.    Subjective   Patient had no acute events overnight.  Experiencing a lot of lower back discomfort related to her sciatica.  Also expressing concerns about swelling in her lower extremities bilaterally.  States she takes Lasix at home as needed for this, but was concerned for a DVT as her left leg appeared more swollen than her right leg.  Patient denied any lightheadedness dizziness, chest pain or difficulty breathing.  Did not have a bowel movement overnight.  Further ROS is unremarkable.    Objective     Physical Exam  General: Well-developed adult female in mild distress  HEENT: Clear sclera, EOMI, trachea midline, moist mucous membranes  Respiratory: Equal chest rise, no retractions  Abdomen: Soft, nontender, nondistended  Extremities: Bilateral lower extremity edema, left rater than right  Neurological: Spontaneously moves all extremities, no dysarthria, cranial nerves grossly intact  Psychiatric: Appropriate mood and affect  Skin: Warm, dry    Last Recorded Vitals  Blood pressure 137/63, pulse 94, temperature 36.7 °C (98.1 °F), resp. rate 16, height 1.524 m (5'), weight 73.4 kg (161 lb 13.1 oz), SpO2 100%.  Intake/Output last 3 Shifts:  No intake/output data recorded.    Relevant Results                              Assessment & Plan  Upper GI hemorrhage    Acute blood loss anemia    CAD S/P percutaneous coronary angioplasty    Essential hypertension, benign    Iron deficiency anemia    Urothelial carcinoma    History of arteriovenous malformation    SIRS (systemic inflammatory response syndrome) (Multi)    64 y.o. female with a history of prior GI bleed secondary to small bowel AVM, CAD s/p PTCA on Plavix, essential hypertension, iron deficiency anemia, urothelial carcinoma with recent recurrence now s/p TURBT (4/4/25) presenting with acute blood loss anemia secondary to upper GI bleed And SIRS    Patient has a  history of AVM and previous GI bleed  Diet escalated to regular  Continue Protonix IV twice daily  Will continue on Plavix given history of multivessel CAD and continue to monitor hemoglobin  Iron studies showed iron deficiency anemia, outpatient follow-up with hematology for iron infusion  Avoid NSAIDs  GI followed while in house.  Took patient for EGD with push enteroscopy on 4/28 which revealed 2 angioectasias in the jejunum s/p APC and edematous/erythematous mucosa with erosions in the stomach s/p biopsy. PPI BID advised. Continued hematology follow-up advised. 2 week follow-up advised. Capsule endoscopy to be considered on the outpatient basis.   Cont tele monitorijng  Blood pressure remains stable. Patient's home lisinopril, metoprolol, and aldactone remain on hold. Will consider discontinuation on DC should pressures remain to dip to the 90's-low 100's systolic during the day.   Outpatient follow-up with urology for urothelial carcinoma she is status post TURBT on 4/4/2025      Danny Marks MD

## 2025-05-01 VITALS
HEIGHT: 60 IN | DIASTOLIC BLOOD PRESSURE: 69 MMHG | BODY MASS INDEX: 31.77 KG/M2 | OXYGEN SATURATION: 98 % | HEART RATE: 95 BPM | SYSTOLIC BLOOD PRESSURE: 152 MMHG | WEIGHT: 161.82 LBS | TEMPERATURE: 99.3 F | RESPIRATION RATE: 16 BRPM

## 2025-05-01 LAB
ANION GAP SERPL CALC-SCNC: 12 MMOL/L (ref 10–20)
BUN SERPL-MCNC: 7 MG/DL (ref 6–23)
CALCIUM SERPL-MCNC: 8.9 MG/DL (ref 8.6–10.3)
CHLORIDE SERPL-SCNC: 105 MMOL/L (ref 98–107)
CO2 SERPL-SCNC: 26 MMOL/L (ref 21–32)
CREAT SERPL-MCNC: 0.62 MG/DL (ref 0.5–1.05)
EGFRCR SERPLBLD CKD-EPI 2021: >90 ML/MIN/1.73M*2
ERYTHROCYTE [DISTWIDTH] IN BLOOD BY AUTOMATED COUNT: 20 % (ref 11.5–14.5)
GLUCOSE BLD MANUAL STRIP-MCNC: 116 MG/DL (ref 74–99)
GLUCOSE SERPL-MCNC: 106 MG/DL (ref 74–99)
HCT VFR BLD AUTO: 28.9 % (ref 36–46)
HGB BLD-MCNC: 9.1 G/DL (ref 12–16)
HOLD SPECIMEN: 293
MCH RBC QN AUTO: 24.2 PG (ref 26–34)
MCHC RBC AUTO-ENTMCNC: 31.5 G/DL (ref 32–36)
MCV RBC AUTO: 77 FL (ref 80–100)
NRBC BLD-RTO: 0 /100 WBCS (ref 0–0)
PLATELET # BLD AUTO: 183 X10*3/UL (ref 150–450)
POTASSIUM SERPL-SCNC: 3.7 MMOL/L (ref 3.5–5.3)
RBC # BLD AUTO: 3.76 X10*6/UL (ref 4–5.2)
SODIUM SERPL-SCNC: 139 MMOL/L (ref 136–145)
WBC # BLD AUTO: 6.6 X10*3/UL (ref 4.4–11.3)

## 2025-05-01 PROCEDURE — 2500000002 HC RX 250 W HCPCS SELF ADMINISTERED DRUGS (ALT 637 FOR MEDICARE OP, ALT 636 FOR OP/ED): Performed by: STUDENT IN AN ORGANIZED HEALTH CARE EDUCATION/TRAINING PROGRAM

## 2025-05-01 PROCEDURE — 80048 BASIC METABOLIC PNL TOTAL CA: CPT | Performed by: STUDENT IN AN ORGANIZED HEALTH CARE EDUCATION/TRAINING PROGRAM

## 2025-05-01 PROCEDURE — 2500000004 HC RX 250 GENERAL PHARMACY W/ HCPCS (ALT 636 FOR OP/ED): Performed by: STUDENT IN AN ORGANIZED HEALTH CARE EDUCATION/TRAINING PROGRAM

## 2025-05-01 PROCEDURE — 82947 ASSAY GLUCOSE BLOOD QUANT: CPT

## 2025-05-01 PROCEDURE — 36415 COLL VENOUS BLD VENIPUNCTURE: CPT | Performed by: STUDENT IN AN ORGANIZED HEALTH CARE EDUCATION/TRAINING PROGRAM

## 2025-05-01 PROCEDURE — 99239 HOSP IP/OBS DSCHRG MGMT >30: CPT | Performed by: STUDENT IN AN ORGANIZED HEALTH CARE EDUCATION/TRAINING PROGRAM

## 2025-05-01 PROCEDURE — 85027 COMPLETE CBC AUTOMATED: CPT | Performed by: STUDENT IN AN ORGANIZED HEALTH CARE EDUCATION/TRAINING PROGRAM

## 2025-05-01 PROCEDURE — 2500000001 HC RX 250 WO HCPCS SELF ADMINISTERED DRUGS (ALT 637 FOR MEDICARE OP): Performed by: STUDENT IN AN ORGANIZED HEALTH CARE EDUCATION/TRAINING PROGRAM

## 2025-05-01 RX ORDER — PANTOPRAZOLE SODIUM 40 MG/1
40 TABLET, DELAYED RELEASE ORAL
Qty: 90 TABLET | Refills: 0 | Status: SHIPPED | OUTPATIENT
Start: 2025-05-01 | End: 2025-07-30

## 2025-05-01 RX ADMIN — HYDROMORPHONE HYDROCHLORIDE 0.6 MG: 1 INJECTION, SOLUTION INTRAMUSCULAR; INTRAVENOUS; SUBCUTANEOUS at 00:56

## 2025-05-01 RX ADMIN — OXYCODONE HYDROCHLORIDE AND ACETAMINOPHEN 1 TABLET: 5; 325 TABLET ORAL at 03:58

## 2025-05-01 RX ADMIN — DICLOFENAC SODIUM 4 G: 10 GEL TOPICAL at 04:01

## 2025-05-01 RX ADMIN — OXYCODONE HYDROCHLORIDE AND ACETAMINOPHEN 1 TABLET: 5; 325 TABLET ORAL at 08:07

## 2025-05-01 RX ADMIN — EZETIMIBE 10 MG: 10 TABLET ORAL at 08:08

## 2025-05-01 RX ADMIN — CLOPIDOGREL BISULFATE 75 MG: 75 TABLET, FILM COATED ORAL at 08:07

## 2025-05-01 RX ADMIN — HYDROMORPHONE HYDROCHLORIDE 0.6 MG: 1 INJECTION, SOLUTION INTRAMUSCULAR; INTRAVENOUS; SUBCUTANEOUS at 09:04

## 2025-05-01 RX ADMIN — POLYETHYLENE GLYCOL 3350 17 G: 17 POWDER, FOR SOLUTION ORAL at 04:35

## 2025-05-01 RX ADMIN — HYDROMORPHONE HYDROCHLORIDE 0.6 MG: 1 INJECTION, SOLUTION INTRAMUSCULAR; INTRAVENOUS; SUBCUTANEOUS at 04:58

## 2025-05-01 RX ADMIN — PANTOPRAZOLE SODIUM 40 MG: 40 TABLET, DELAYED RELEASE ORAL at 05:29

## 2025-05-01 ASSESSMENT — PAIN - FUNCTIONAL ASSESSMENT
PAIN_FUNCTIONAL_ASSESSMENT: 0-10

## 2025-05-01 ASSESSMENT — PAIN DESCRIPTION - ORIENTATION
ORIENTATION: RIGHT;LEFT;MID;LOWER
ORIENTATION: RIGHT;LEFT;MID;LOWER
ORIENTATION: RIGHT;LEFT;MID

## 2025-05-01 ASSESSMENT — PAIN DESCRIPTION - DESCRIPTORS
DESCRIPTORS: ACHING

## 2025-05-01 ASSESSMENT — COGNITIVE AND FUNCTIONAL STATUS - GENERAL
DAILY ACTIVITIY SCORE: 24
MOBILITY SCORE: 24

## 2025-05-01 ASSESSMENT — PAIN SCALES - GENERAL
PAINLEVEL_OUTOF10: 7
PAINLEVEL_OUTOF10: 4
PAINLEVEL_OUTOF10: 7
PAINLEVEL_OUTOF10: 3
PAINLEVEL_OUTOF10: 7
PAINLEVEL_OUTOF10: 7

## 2025-05-01 ASSESSMENT — PAIN DESCRIPTION - LOCATION
LOCATION: BACK

## 2025-05-01 NOTE — NURSING NOTE
This nurse introduced self and role to patient and friend, prepared and provided AVS, sat with both, started and completed discharge education, pt verbalized understanding, without further questions or concerns, agreeable and comfortable with discharge at this time, pivs x2 removed with catheters intact, tele returned to desk, pt states she has all of her belongings, transport requested, discharge complete.

## 2025-05-01 NOTE — PROGRESS NOTES
05/01/25 0939   Discharge Planning   Expected Discharge Disposition Home     Plan remains HOME no needs.

## 2025-05-01 NOTE — DISCHARGE SUMMARY
Medical Group Discharge Summary  DISCHARGE DIAGNOSIS     Upper GI Hemorrhage  Atriovenous Malformations  Hx CAD s/p PCI, HTN, Iron deficiency anemia, urothelial carcinoma, SIRS    HOSPITAL COURSE AND DETAILS       Cuca Laguerre is a 64 y.o. female with a history of prior GI bleed secondary to small bowel AVM, CAD s/p PTCA on Plavix, essential hypertension, iron deficiency anemia, urothelial carcinoma with recent recurrence now s/p TURBT (4/4/25) who presented with dark stools for one week.      ER Course: Tachycardic, otherwise vital signs stable.  Labs notable for hyperglycemia, hyponatremia, elevated BUN, leukocytosis, acute on chronic microcytic anemia.  CT C/A/P shows moderate emphysematous changes, adrenal adenomas, and wall thickening of distal colon.  Patient was given IV Protonix 80 mg, 1 L normal saline and Zosyn.  Blood cultures drawn.       #.  Acute blood loss anemia secondary to upper GI hemorrhage  #.  History of AVM  #.  History of ROHIT  - P/w 1 week of melena, Hgb 7.9 (baseline around 9.0), on Plavix  - Plavix was placed on hold initially  - Patient started on IV PPI twice daily  - GI followed while in house. Took patient for EGD with push enteroscopy on 4/28 which revealed 2 angioectasias in the jejunum s/p APC and edematous/erythematous mucosa with erosions in the stomach s/p biopsy. PPI BID advised. Continued hematology follow-up advised. 2 week follow-up advised. Capsule endoscopy to be considered on the outpatient basis.   - Patient's plavix was resumed and Hb was monitored. Hb remained relatively stable however dropped from 7.4 to 6.7 and required 1 unit of PRBC transfusion. Hb improved to 9.0 following transfusion and remained stable.   - Patient to be discharged home with prescription for PPI daily provided. Follow-up information for GI provided. Patient advised to avoid NSAIDs and maintain GI & hematology follow-ups outpatient.      #.  SIRS  - SIRS positive on presentaiotn. Likely  reactive in the setting of GI bleed  - CT C/A/P reviewed without infectious source, low suspicion for sepsis  - Plan as above for GI bleed, Further ATB were deferred and patient remained stable.   - Bcx finalized as negative      #.  CAD s/p PTCA  #.  Essential hypertension  - Endorsed some chest pain on presentation.  - Troponins were negative x 2, EKG showed nonspecific ST changes  - Likely precipitated by underlying anemia, low suspicion for ACS  - Plavix initially held but resumed on DC.   - Anti HTN agents were held in house due to soft pressures. Will resume on Losartan and metoprolol on DC and advise discontinuation of aldactone at this time. Advise further outpt follow-up with PCP for medication titration.   - Cont statin      #.  Urothelial carcinoma  - Follows with urology, recent recurrence noted on cystoscopy and now s/p TURBT on 4/4/25  - Outpatient follow-up with urology      Patient was in stable condition on day of discharge with no acute concerns.    35 minutes spent on discharge. Time calculated includes outpatient care coordination, bedside education, and counselling.       Danny Marks MD    DISCHARGE PHYSICAL EXAM     Last Recorded Vitals:  Vitals:    04/30/25 1650 04/30/25 1933 05/01/25 0411 05/01/25 0756   BP: 141/67 144/64 121/66 152/69   BP Location:  Left arm Right arm    Patient Position:  Sitting Sitting    Pulse:  89 91 95   Resp:  16 16    Temp:  36.3 °C (97.3 °F) 36.4 °C (97.5 °F) 37.4 °C (99.3 °F)   TempSrc:  Temporal Temporal    SpO2:  97% 96% 98%   Weight:       Height:           Physical Exam  General: Well-developed adult female in mild distress  HEENT: Clear sclera, EOMI, trachea midline, moist mucous membranes  Respiratory: Equal chest rise, no retractions  Abdomen: Soft, nontender, nondistended  Extremities: No cyanosis or clubbing appreciated  Neurological: Spontaneously moves all extremities, no dysarthria, cranial nerves grossly intact  Psychiatric: Appropriate mood and  affect  Skin: Warm, dry    DISCHARGE MEDICATIONS        Your medication list        ASK your doctor about these medications        Instructions Last Dose Given Next Dose Due   acarbose 25 mg tablet  Commonly known as: Precose           atorvastatin 80 mg tablet  Commonly known as: Lipitor      TAKE ONE TABLET BY MOUTH ONCE DAILY AT BEDTIME       blood sugar diagnostic           cholecalciferol (vitamin D3) 25 mcg (1,000 unit) tablet,chewable           clonazePAM 0.5 mg tablet  Commonly known as: KlonoPIN           clopidogrel 75 mg tablet  Commonly known as: Plavix      Take 1 tablet (75 mg) by mouth once daily.       ezetimibe 10 mg tablet  Commonly known as: Zetia      Take 1 tablet (10 mg) by mouth once daily.       furosemide 40 mg tablet  Commonly known as: Lasix           losartan 50 mg tablet  Commonly known as: Cozaar      Take 1 tablet (50 mg) by mouth 2 times a day.       magnesium oxide 400 mg (241.3 mg elemental) tablet  Commonly known as: Mag-Ox           methocarbamol 500 mg tablet  Commonly known as: Robaxin           metoprolol tartrate 50 mg tablet  Commonly known as: Lopressor      Take 1 tablet by mouth 2 times a day.       nitrofurantoin (macrocrystal-monohydrate) 100 mg capsule  Commonly known as: Macrobid      Take 1 capsule (100 mg) by mouth 2 times a day.       nitroglycerin 0.4 mg SL tablet  Commonly known as: Nitrostat           oxyCODONE-acetaminophen 5-325 mg tablet  Commonly known as: Percocet      Take 1 tablet by mouth every 4 hours if needed for moderate pain (4 - 6).       phenazopyridine 200 mg tablet  Commonly known as: Pyridium      Take 1 tablet (200 mg) by mouth 3 times a day as needed for bladder spasms.       potassium chloride CR 20 mEq ER tablet  Commonly known as: Klor-Con M20           spironolactone 50 mg tablet  Commonly known as: Aldactone      Take 1 tablet (50 mg) by mouth once daily.       tiZANidine 4 mg tablet  Commonly known as: Zanaflex           vibegron 75 mg  tablet      Take 1 tablet (75 mg) by mouth once daily.                  OUTPATIENT FOLLOW-UP     Future Appointments   Date Time Provider Department Center   5/8/2025 10:40 AM DANN [id number] [place]   5/16/2025  9:00 AM Ellen A Conchis, APRN-CNP [id number]   5/22/2025 10:00 AM DANN [facility]   5/29/2025 10:40 AM DANN [facility]   6/5/2025 10:40 AM DANN [facility]   6/13/2025 11:00 AM MD JEANNE [id number] [place]   8/8/2025 10:10 AM Alexis Felix MD [id number]   3/12/2026  1:30 PM Jj Bernard MD [id number] [facility]

## 2025-05-02 LAB
BACTERIA BLD CULT: NORMAL
BACTERIA BLD CULT: NORMAL

## 2025-05-08 ENCOUNTER — APPOINTMENT (OUTPATIENT)
Dept: UROLOGY | Facility: CLINIC | Age: 65
End: 2025-05-08
Payer: MEDICARE

## 2025-05-08 VITALS — HEART RATE: 85 BPM | DIASTOLIC BLOOD PRESSURE: 54 MMHG | TEMPERATURE: 97.1 F | SYSTOLIC BLOOD PRESSURE: 123 MMHG

## 2025-05-08 DIAGNOSIS — R35.0 URINARY FREQUENCY: ICD-10-CM

## 2025-05-08 DIAGNOSIS — C68.9 UROTHELIAL CARCINOMA: Primary | ICD-10-CM

## 2025-05-08 LAB
LABORATORY COMMENT REPORT: NORMAL
PATH REPORT.FINAL DX SPEC: NORMAL
PATH REPORT.GROSS SPEC: NORMAL
PATH REPORT.TOTAL CANCER: NORMAL
POC APPEARANCE, URINE: CLEAR
POC BILIRUBIN, URINE: NEGATIVE
POC BLOOD, URINE: NEGATIVE
POC COLOR, URINE: YELLOW
POC GLUCOSE, URINE: NEGATIVE MG/DL
POC KETONES, URINE: NEGATIVE MG/DL
POC LEUKOCYTES, URINE: ABNORMAL
POC NITRITE,URINE: NEGATIVE
POC PH, URINE: 5.5 PH
POC PROTEIN, URINE: ABNORMAL MG/DL
POC SPECIFIC GRAVITY, URINE: 1.02
POC UROBILINOGEN, URINE: 0.2 EU/DL

## 2025-05-08 PROCEDURE — 51720 TREATMENT OF BLADDER LESION: CPT | Performed by: NURSE PRACTITIONER

## 2025-05-08 PROCEDURE — 81003 URINALYSIS AUTO W/O SCOPE: CPT | Performed by: NURSE PRACTITIONER

## 2025-05-08 RX ORDER — FAMOTIDINE 10 MG/ML
20 INJECTION, SOLUTION INTRAVENOUS ONCE AS NEEDED
Status: DISCONTINUED | OUTPATIENT
Start: 2025-05-08 | End: 2025-05-08 | Stop reason: HOSPADM

## 2025-05-08 RX ORDER — DIPHENHYDRAMINE HYDROCHLORIDE 50 MG/ML
50 INJECTION, SOLUTION INTRAMUSCULAR; INTRAVENOUS AS NEEDED
Status: DISCONTINUED | OUTPATIENT
Start: 2025-05-08 | End: 2025-05-08 | Stop reason: HOSPADM

## 2025-05-08 RX ORDER — EPINEPHRINE 0.3 MG/.3ML
0.3 INJECTION SUBCUTANEOUS EVERY 5 MIN PRN
Status: DISCONTINUED | OUTPATIENT
Start: 2025-05-08 | End: 2025-05-08 | Stop reason: HOSPADM

## 2025-05-08 RX ORDER — ALBUTEROL SULFATE 0.83 MG/ML
3 SOLUTION RESPIRATORY (INHALATION) AS NEEDED
Status: DISCONTINUED | OUTPATIENT
Start: 2025-05-08 | End: 2025-05-08 | Stop reason: HOSPADM

## 2025-05-08 NOTE — PROGRESS NOTES
Procedure Visit on 05/08/2025   Component Date Value Ref Range Status   • POC Color, Urine 05/08/2025 Yellow  Straw, Yellow, Light-Yellow Final   • POC Appearance, Urine 05/08/2025 Clear  Clear Final   • POC Glucose, Urine 05/08/2025 NEGATIVE  NEGATIVE mg/dl Final   • POC Bilirubin, Urine 05/08/2025 NEGATIVE  NEGATIVE Final   • POC Ketones, Urine 05/08/2025 NEGATIVE  NEGATIVE mg/dl Final   • POC Specific Gravity, Urine 05/08/2025 1.025  1.005 - 1.035 Final   • POC Blood, Urine 05/08/2025 NEGATIVE  NEGATIVE Final   • POC PH, Urine 05/08/2025 5.5  No Reference Range Established PH Final   • POC Protein, Urine 05/08/2025 TRACE (A)  NEGATIVE mg/dl Final   • POC Urobilinogen, Urine 05/08/2025 0.2  0.2, 1.0 EU/DL Final   • Poc Nitrite, Urine 05/08/2025 NEGATIVE  NEGATIVE Final   • POC Leukocytes, Urine 05/08/2025 SMALL (1+) (A)  NEGATIVE Final   Patient ID: Cuca Laguerre is a 64 y.o. female.    Bladder Catheterization    Date/Time: 5/8/2025 11:09 AM    Performed by: DANN Elizabeth  Authorized by: DANN Elizabeth    Procedure Details    Procedure: bladder instillation      Catheter type: red rubber      Catheter size: 14 Fr    Urine characteristics: clear and yellow    Post-Procedure Details     Outcome: patient tolerated procedure well with no complications      Post-procedure interventions: post-procedure education provided      Disposition: discharged home in satisfactory condition

## 2025-05-14 DIAGNOSIS — C68.9 UROTHELIAL CARCINOMA: ICD-10-CM

## 2025-05-16 ENCOUNTER — APPOINTMENT (OUTPATIENT)
Dept: UROLOGY | Facility: CLINIC | Age: 65
End: 2025-05-16
Payer: MEDICARE

## 2025-05-16 DIAGNOSIS — R35.0 URINARY FREQUENCY: ICD-10-CM

## 2025-05-16 DIAGNOSIS — N30.00 ACUTE CYSTITIS WITHOUT HEMATURIA: Primary | ICD-10-CM

## 2025-05-16 DIAGNOSIS — C67.6 MALIGNANT NEOPLASM OF URETERIC ORIFICE (MULTI): Primary | ICD-10-CM

## 2025-05-16 LAB
POC APPEARANCE, URINE: ABNORMAL
POC BILIRUBIN, URINE: NEGATIVE
POC BLOOD, URINE: ABNORMAL
POC COLOR, URINE: YELLOW
POC GLUCOSE, URINE: NEGATIVE MG/DL
POC KETONES, URINE: NEGATIVE MG/DL
POC LEUKOCYTES, URINE: ABNORMAL
POC NITRITE,URINE: POSITIVE
POC PH, URINE: 6.5 PH
POC PROTEIN, URINE: ABNORMAL MG/DL
POC SPECIFIC GRAVITY, URINE: 1.02
POC UROBILINOGEN, URINE: 0.2 EU/DL

## 2025-05-16 PROCEDURE — 81002 URINALYSIS NONAUTO W/O SCOPE: CPT | Performed by: NURSE PRACTITIONER

## 2025-05-16 RX ORDER — VIBEGRON 75 MG/1
75 TABLET, FILM COATED ORAL DAILY
Qty: 28 TABLET | Refills: 0 | COMMUNITY
Start: 2025-05-16 | End: 2025-06-13

## 2025-05-16 RX ORDER — AMOXICILLIN AND CLAVULANATE POTASSIUM 875; 125 MG/1; MG/1
1 TABLET, FILM COATED ORAL 2 TIMES DAILY
Qty: 14 TABLET | Refills: 0 | Status: SHIPPED | OUTPATIENT
Start: 2025-05-16 | End: 2025-05-23

## 2025-05-16 NOTE — PROGRESS NOTES
Pt here for BCG. Urine shows positive Nitrites. Saundra KEYS, notified. Urine will be sent for culture and will follow up once resulted. Pt verbalized understanding.

## 2025-05-17 DIAGNOSIS — E78.2 MIXED HYPERLIPIDEMIA: ICD-10-CM

## 2025-05-17 LAB
BACTERIA #/AREA URNS HPF: ABNORMAL /HPF
HYALINE CASTS #/AREA URNS LPF: ABNORMAL /LPF
RBC #/AREA URNS HPF: ABNORMAL /HPF
SERVICE CMNT-IMP: ABNORMAL
SQUAMOUS #/AREA URNS HPF: ABNORMAL /HPF
WBC #/AREA URNS HPF: ABNORMAL /HPF

## 2025-05-18 LAB — BACTERIA UR CULT: ABNORMAL

## 2025-05-20 ENCOUNTER — APPOINTMENT (OUTPATIENT)
Dept: GASTROENTEROLOGY | Facility: CLINIC | Age: 65
End: 2025-05-20
Payer: MEDICARE

## 2025-05-20 VITALS
HEART RATE: 83 BPM | SYSTOLIC BLOOD PRESSURE: 150 MMHG | BODY MASS INDEX: 32.2 KG/M2 | OXYGEN SATURATION: 99 % | WEIGHT: 164 LBS | DIASTOLIC BLOOD PRESSURE: 76 MMHG | HEIGHT: 60 IN | RESPIRATION RATE: 16 BRPM

## 2025-05-20 DIAGNOSIS — K21.9 GASTROESOPHAGEAL REFLUX DISEASE, UNSPECIFIED WHETHER ESOPHAGITIS PRESENT: Primary | ICD-10-CM

## 2025-05-20 DIAGNOSIS — D50.9 IRON DEFICIENCY ANEMIA, UNSPECIFIED IRON DEFICIENCY ANEMIA TYPE: ICD-10-CM

## 2025-05-20 DIAGNOSIS — K55.20 AVM (ARTERIOVENOUS MALFORMATION) OF SMALL BOWEL, ACQUIRED: ICD-10-CM

## 2025-05-20 DIAGNOSIS — D62 ACUTE BLOOD LOSS ANEMIA: ICD-10-CM

## 2025-05-20 PROCEDURE — 4010F ACE/ARB THERAPY RXD/TAKEN: CPT

## 2025-05-20 PROCEDURE — 3008F BODY MASS INDEX DOCD: CPT

## 2025-05-20 PROCEDURE — 3078F DIAST BP <80 MM HG: CPT

## 2025-05-20 PROCEDURE — 3044F HG A1C LEVEL LT 7.0%: CPT

## 2025-05-20 PROCEDURE — 1036F TOBACCO NON-USER: CPT

## 2025-05-20 PROCEDURE — 99214 OFFICE O/P EST MOD 30 MIN: CPT

## 2025-05-20 PROCEDURE — G2211 COMPLEX E/M VISIT ADD ON: HCPCS

## 2025-05-20 PROCEDURE — 3077F SYST BP >= 140 MM HG: CPT

## 2025-05-20 RX ORDER — PANTOPRAZOLE SODIUM 40 MG/1
40 TABLET, DELAYED RELEASE ORAL
Qty: 90 TABLET | Refills: 0 | Status: SHIPPED | OUTPATIENT
Start: 2025-05-20 | End: 2025-08-18

## 2025-05-20 ASSESSMENT — ENCOUNTER SYMPTOMS
PALPITATIONS: 0
APPETITE CHANGE: 0
SEIZURES: 0
CONSTIPATION: 0
VOICE CHANGE: 0
ABDOMINAL DISTENTION: 0
DIZZINESS: 0
COUGH: 0
LIGHT-HEADEDNESS: 0
CHOKING: 0
HEMATURIA: 0
WEAKNESS: 0
NERVOUS/ANXIOUS: 0
NAUSEA: 0
ANAL BLEEDING: 0
RECTAL PAIN: 0
TROUBLE SWALLOWING: 0
JOINT SWELLING: 0
FLANK PAIN: 0
CHILLS: 0
ARTHRALGIAS: 0
FEVER: 0
ABDOMINAL PAIN: 0
SHORTNESS OF BREATH: 0
BRUISES/BLEEDS EASILY: 0
BLOOD IN STOOL: 0
VOMITING: 0
MYALGIAS: 0
TREMORS: 0
COLOR CHANGE: 0
CONFUSION: 0
UNEXPECTED WEIGHT CHANGE: 0
AGITATION: 0
DIARRHEA: 0

## 2025-05-20 NOTE — PATIENT INSTRUCTIONS
Lifestyle Changes to Improve Gastroesophageal Reflux Symptoms     Do not sleep flat:  Your head and chest need to be least 30 degrees above your belly  This allows gravity to help keep the stomach's contents in the stomach.   Use a bed wedge pillow or an anti-reflux pillow.    Do not use piles of pillows - may increase pressure on the abdomen              - Sleep on your left side - Reduces reflux because of the way your stomach curves.               - a body pillow can help keep you on your left  See: www.[employer].com for an integrated Anti-reflux sleep system           For assist with insurance coverage: http://[employer]/insurance-coverage-information/     Make sure that you are taking your Proton Pump Inhibitor medication (such as omeprazole [Prilosec] and like medications) correctly. Take them 30 minutes BEFORE Breakfast and/or Dinner, as instructed - they are more effective when take before the meal!  Eat moderate portions of food and smaller meals.   Avoid lying down within 3 hours of eating  Avoid bedtime snacks.   Maintain a healthy weight   extra pounds increase intra-abdominal pressure   Limit consumption of foods that relax the lower esophageal sphincter - if they are known to bother you.    Examples:  fatty foods, chocolate, peppermint, coffee, tea, madelaine, and alcohol   Avoid foods which contribute additional acid that can irritate the esophagus  Examples:  tomatoes and citrus fruits or juices  If you smoke, you need to stop.  Smoking relaxes the lower esophageal sphincter.  Ask about smoking cessation tips  Wear loose belts and clothing.

## 2025-05-20 NOTE — PROGRESS NOTES
[facility]   Hospital FUV for GIB  Patient ID: Cuca Laguerre is a 64 y.o. female who presents for Follow-up.        History of Present Illness:   Cuca Laguerre is a 64 y.o. 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  At that time, Hgb 7.9 (baseline 9.0), MCV 74, iron 27, ferritin 10, TIBC 403, iron saturation 7%, platelets 370 and LFTs normal.  ROHIT.  1 unit PRBCs given.  CT with thickening of the distal colon and scattered colonic diverticulosis.  She follows with hematology for ROHIT control. Does iron infusions every 6 months.  Upon today's assessment patient states she feels overall better with no complaints.  She has not had any abnormal stools in the sense of melena, hematochezia, mucus, abdominal pain and rectal pain.  Her stools are brown, formed, daily without straining.  Denies shortness of breath, chest pain, dizziness, dyspnea.  She recently received an iron infusion and is scheduled to get repeat ROHIT blood work to assess response.  She denies all upper GI red flags including odynophagia, dysphagia, altered appetite and weight.  She has been compliant with antireflux lifestyle and pantoprazole PPI 40 mg 30 minutes daily before meal.  Her last VCE study 6/9/2021 showing 1 single nonbleeding angiodysplastic lesion in the duodenum treated with APC.  Patient denies smoking, although use, drug use, NSAID use.      S/p EGD/push enteroscopy 4/28/2025 with Dr. Díaz indicated for anemia, ROHIT, melena  Impression  2 angioectasias in the jejunum s/p APC (small bowel settings).  The duodenum appeared normal.  Edematous, erythematous mucosa with erosions in the stomach s/p biopsies.   Small hiatal hernia (Hill grade II).   Normal esophagus.   Surgical pathology  FINAL  DIAGNOSIS   STOMACH, BODY, BIOPSY:  - Antral and oxyntic mucosa with no significant abnormality.  - Negative for Helicobacter.     EGD: 10/8/2024 with Dr. Shepard indicated for melena  Impression  The esophagus appeared normal.  Abnormal mucosa  STOMACH BIOPSY:   -- GASTRIC MUCOSA WITH REACTIVE GASTROPATHY  -- HELICOBACTER PYLORI NOT IDENTIFIED      Colonoscopy: 10/8/2024 with Dr. Shepard indicated for melena  Impression  Subcentimeter polyp in the transverse colon was removed with cold snare  Diverticulosis in the descending colon and sigmoid colon  Small hemorrhoids  Repeat colonoscopy in 5 years, due October, 2029  TRANSVERSE COLON POLYP:      -- FRAGMENTS OF TUBULAR ADENOMA      Enteroscopy 6/9/2021 with Dr. Gaona indicated for abnormal video capsule endoscopy, ROHIT  Impression:             - A single non-bleeding angiodysplastic lesion in the duodenum. Treated with argon plasma coagulation (APC).  - Normal examined duodenum.  - Gastritis.  - Normal esophagus.  - No specimens collected.     Capsule endoscopy by Dr. Andrade 5/18/2021  - A single angioectasia without bleeding in the small bowel.  - Small bowel erosion visualized.     Colonoscopy by Dr Gaona 1/6/2021  - Two 3 mm polyps in the descending colon and in the transverse colon, removed with a cold biopsy forceps. Resected and retrieved. Tubular adenomas and hyperplastic.   - Diverticulosis in the sigmoid colon.  - Internal hemorrhoids.  - The examination was otherwise normal.     EGD by Dr Díaz 1/6/2021  - Normal esophagus.  - Erosive gastropathy with no stigmata of recent bleeding. Bx normal gastric tissue, no H. Pylori.   - A single gastric polyp. Biopsied. Inflammatory polyp.   - Normal examined duodenum. Biopsied. Bx normal duodenal tissue. No celiac.   Review of Systems  ROS Negative unless otherwise stated above.    Past Medical/Surgical History  Medical History[1]   Surgical History[2]     Social History   reports that she quit smoking about 5  years ago. Her smoking use included cigarettes. She started smoking about 15 years ago. She has a 5 pack-year smoking history. She has never used smokeless tobacco. She reports current alcohol use. She reports that she does not use drugs.     Family History  family history includes Dementia in her mother; Diabetes in her brother, father, and mother; Heart disease in her mother; Hypertension in her mother; cardiac disorder in her father.     Allergies  RX Allergies[3]    Medications  Current Outpatient Medications   Medication Instructions    acarbose (Precose) 25 mg tablet Take 1 tablet by mouth 3 times daily (with meals)    amoxicillin-clavulanate (Augmentin) 875-125 mg tablet 1 tablet, oral, 2 times daily    atorvastatin (LIPITOR) 80 mg, oral, Nightly    blood sugar diagnostic strip 1 each by In Vitro route daily As needed.    cholecalciferol, vitamin D3, 25 mcg (1,000 unit) tablet,chewable 1 tablet, Daily    clonazePAM (KlonoPIN) 0.5 mg tablet     ezetimibe (ZETIA) 10 mg, oral, Daily    furosemide (Lasix) 40 mg tablet 2 tablets, Daily    Gemtesa 75 mg, oral, Daily    losartan (COZAAR) 50 mg, oral, 2 times daily    magnesium oxide (Mag-Ox) 400 mg (241.3 mg magnesium) tablet Take 1 tablet by mouth daily    methocarbamol (Robaxin) 500 mg tablet 1 tablet, As needed    metoprolol tartrate (LOPRESSOR) 50 mg, oral, 2 times daily    nitroglycerin (Nitrostat) 0.4 mg SL tablet PLACE 1 TABLET UNDER THE TONGUE EVERY 5 MINUTES FOR UP TO 3 DOSES AS NEEDED FOR CHEST PAIN    oxyCODONE-acetaminophen (Percocet) 5-325 mg tablet 1 tablet, oral, Every 4 hours PRN    pantoprazole (PROTONIX) 40 mg, oral, Daily before breakfast, Do not crush, chew, or split.    potassium chloride CR 20 mEq ER tablet     vibegron 75 mg, oral, Daily          Review of Systems   Constitutional:  Negative for appetite change, chills, fever and unexpected weight change.   HENT:  Negative for mouth sores, nosebleeds, trouble swallowing and voice change.     Respiratory:  Negative for cough, choking and shortness of breath.    Cardiovascular:  Negative for chest pain, palpitations and leg swelling.   Gastrointestinal:  Negative for abdominal distention, abdominal pain, anal bleeding, blood in stool, constipation, diarrhea, nausea, rectal pain and vomiting.   Genitourinary:  Negative for flank pain and hematuria.   Musculoskeletal:  Negative for arthralgias, joint swelling and myalgias.   Skin:  Negative for color change and rash.   Allergic/Immunologic: Negative for environmental allergies, food allergies and immunocompromised state.   Neurological:  Negative for dizziness, tremors, seizures, syncope, weakness and light-headedness.   Hematological:  Does not bruise/bleed easily.   Psychiatric/Behavioral:  Negative for agitation and confusion. The patient is not nervous/anxious.    All other systems reviewed and are negative.      Objective   Physical Exam  Constitutional:       General: She is not in acute distress.     Appearance: Normal appearance. She is not ill-appearing, toxic-appearing or diaphoretic.   HENT:      Head: Normocephalic.      Mouth/Throat:      Mouth: Mucous membranes are moist.      Pharynx: No oropharyngeal exudate or posterior oropharyngeal erythema.   Cardiovascular:      Rate and Rhythm: Normal rate and regular rhythm.      Pulses: Normal pulses.      Heart sounds: Normal heart sounds.   Pulmonary:      Effort: Pulmonary effort is normal.      Breath sounds: Normal breath sounds.   Abdominal:      General: Abdomen is flat. Bowel sounds are normal. There is no distension.      Palpations: Abdomen is soft. There is no mass.      Tenderness: There is no abdominal tenderness. There is no right CVA tenderness, left CVA tenderness, guarding or rebound.      Hernia: No hernia is present.   Musculoskeletal:         General: No swelling.   Skin:     Capillary Refill: Capillary refill takes less than 2 seconds.      Coloration: Skin is not jaundiced or  "pale.      Findings: No bruising, lesion or rash.   Neurological:      General: No focal deficit present.      Mental Status: She is alert and oriented to person, place, and time.   Psychiatric:         Mood and Affect: Mood normal.         Behavior: Behavior normal.         Thought Content: Thought content normal.         Judgment: Judgment normal.       /76   Pulse 83   Resp 16   Ht 1.524 m (5')   Wt 74.4 kg (164 lb)   SpO2 99%   BMI 32.03 kg/m²      Lab Results   Component Value Date    WBC 6.6 05/01/2025    HGB 9.1 (L) 05/01/2025    HCT 28.9 (L) 05/01/2025    MCV 77 (L) 05/01/2025     05/01/2025           No lab exists for component: \"LABALBU\"    No results found for: \"AFP\"  No results found for: \"TSH\"      Assessment/Plan   Cuca Laguerre is a 64 y.o. 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  We discussed the results of her EGD study and pathology.  Patient will continue to comply with antireflux lifestyle and pantoprazole 40 mg daily 30 minutes before meal.  She will continue to follow with hematology as she has replete blood work to be done in the near future to assess iron infusion response.  Patient has no complaints or red flags upon this visit.  She will follow-up with me in 3 months to assess how she is doing.  Given her symptoms and blood work at that time we may consider capsule endoscopy.  Diagnoses and all orders for this visit:  Gastroesophageal reflux disease, unspecified whether esophagitis present  -     CBC; Future  Acute blood loss anemia  -     pantoprazole (ProtoNix) 40 mg EC tablet; Take 1 tablet (40 mg) by mouth once daily in the morning. Take before meals. Do not crush, chew, or split.  AVM (arteriovenous malformation) of small bowel, acquired  -     CBC; Future  -     pantoprazole (ProtoNix) 40 mg EC tablet; Take 1 tablet " (40 mg) by mouth once daily in the morning. Take before meals. Do not crush, chew, or split.  Iron deficiency anemia, unspecified iron deficiency anemia type      Yolanda Bains APRN-CNP          [1]   Past Medical History:  Diagnosis Date    Anxiety     Bladder cancer (Multi)     Chronic pain disorder     Coronary artery disease     Diabetes mellitus (Multi)     Fibromyalgia, primary     Heart disease     Hyperlipidemia     Hypertension     Lumbar disc disease     Myocardial infarction (Multi)     PONV (postoperative nausea and vomiting)    [2]   Past Surgical History:  Procedure Laterality Date    APPENDECTOMY      CT HEAD ANGIO W AND WO IV CONTRAST  01/30/2023    CT HEAD ANGIO W AND WO IV CONTRAST 1/30/2023 DOCTOR OFFICE [facility]GAC[facility]    HYSTERECTOMY  04/01/2014    Hysterectomy    MR HEAD ANGIO WO IV CONTRAST  01/29/2023    MR HEAD ANGIO WO IV CONTRAST 1/29/2023 DOCTOR OFFICE LEGACY    OTHER SURGICAL HISTORY  09/15/2022    Appendectomy    OTHER SURGICAL HISTORY  07/25/2022    Transurethral resection of bladder tumor    OTHER SURGICAL HISTORY  06/15/2022    Surgery    OTHER SURGICAL HISTORY  06/15/2022    Colonoscopy    OTHER SURGICAL HISTORY  06/15/2022    Ulnar nerve transposition    OTHER SURGICAL HISTORY  07/25/2022    Shoulder surgery    TONSILLECTOMY  04/01/2014    Tonsillectomy   [3]   Allergies  Allergen Reactions    Iodides Anaphylaxis     Contrast DYE ONLY    Other reaction(s): Hives, Respiratory distress   IVP dye    Iodinated Contrast Media Hives, Rash and Shortness of breath     IVP dye    Erythromycin Rash    Gabapentin Other     No focus    Moxifloxacin Unknown    Pregabalin Other     confusion    Metformin Hives, Itching and Rash    Sulfa (Sulfonamide Antibiotics) Hives and Rash     Other reaction(s): Hives

## 2025-05-21 DIAGNOSIS — C68.9 UROTHELIAL CARCINOMA: ICD-10-CM

## 2025-05-22 ENCOUNTER — APPOINTMENT (OUTPATIENT)
Dept: UROLOGY | Facility: CLINIC | Age: 65
End: 2025-05-22
Payer: MEDICARE

## 2025-05-22 DIAGNOSIS — I25.10 CORONARY ARTERY DISEASE INVOLVING NATIVE CORONARY ARTERY OF NATIVE HEART, UNSPECIFIED WHETHER ANGINA PRESENT: ICD-10-CM

## 2025-05-22 RX ORDER — CLOPIDOGREL BISULFATE 75 MG/1
75 TABLET ORAL DAILY
Qty: 90 TABLET | Refills: 3 | Status: SHIPPED | OUTPATIENT
Start: 2025-05-22 | End: 2026-05-22

## 2025-05-22 NOTE — TELEPHONE ENCOUNTER
12/06/19 1607   Non-Invasive Information   Interface Face mask   Non-Invasive Ventilation Mode BiPAP   $ Pulse Oximetry Spot Check Charge Completed   Resp Comments Pt resting comfortably on bipap at this time  Non-Invasive Settings   IPAP (cm) 16 cm   EPAP (cm) 6 cm   Rate (Set) 8   FiO2 (%) 100   Rise Time 2   Inspiratory Time (Set) 1   Temperature (Set) 31   Non-Invasive Readings   Total Rate 14   MV (Mech) 20 5   Peak Pressure (Obs) 21   Spontaneous Vt (mL) 812   Heater Temperature (Obs) 30 7   Leak (lpm) 42   Skin Intervention Skin intact   Non-Invasive Alarms   Insp Pressure High (cm H20) 28   Insp Pressure Low (cm H20) 5   Low Insp Pressure Time (sec) 20 sec   MV Low (L/min) 3   Vt High (mL) 1600   Vt Low (mL) 200   High Resp Rate (BPM) 45 BPM   Low Resp Rate (BPM) 10 BPM   Apnea Interval (sec) 20   Apnea Rate 20   RT Ventilator Management Note  Harle Cough 80 y o  male MRN: [id number]  Unit/Bed#:  Encounter: [id number]      Daily Screen     No data found in the last 10 encounters  Physical Exam:   Assessment Type: Assess only  General Appearance: Awake, Alert  Respiratory Pattern: Tachypneic  Chest Assessment: Chest expansion symmetrical  Bilateral Breath Sounds: Scattered, Coarse, Crackles      Resp Comments: Pt resting comfortably on bipap at this time  Received request for prescription refills for patient.   Patient follows with     Request is for clopidogrel  Is patient currently on medication yes    Last OV 3/13/2025  Next OV 3/12/2026    Pended for signing and sent to provider

## 2025-05-23 ENCOUNTER — PROCEDURE VISIT (OUTPATIENT)
Dept: UROLOGY | Facility: CLINIC | Age: 65
End: 2025-05-23
Payer: MEDICARE

## 2025-05-23 DIAGNOSIS — C68.9 UROTHELIAL CARCINOMA: Primary | ICD-10-CM

## 2025-05-23 LAB
POC APPEARANCE, URINE: CLEAR
POC BILIRUBIN, URINE: NEGATIVE
POC BLOOD, URINE: NEGATIVE
POC COLOR, URINE: YELLOW
POC GLUCOSE, URINE: ABNORMAL MG/DL
POC KETONES, URINE: NEGATIVE MG/DL
POC LEUKOCYTES, URINE: NEGATIVE
POC NITRITE,URINE: NEGATIVE
POC PH, URINE: 6 PH
POC PROTEIN, URINE: ABNORMAL MG/DL
POC SPECIFIC GRAVITY, URINE: 1.02
POC UROBILINOGEN, URINE: 0.2 EU/DL

## 2025-05-23 RX ORDER — EPINEPHRINE 0.3 MG/.3ML
0.3 INJECTION SUBCUTANEOUS EVERY 5 MIN PRN
Status: DISCONTINUED | OUTPATIENT
Start: 2025-05-23 | End: 2025-05-23 | Stop reason: HOSPADM

## 2025-05-23 RX ORDER — DIPHENHYDRAMINE HYDROCHLORIDE 50 MG/ML
50 INJECTION, SOLUTION INTRAMUSCULAR; INTRAVENOUS AS NEEDED
Status: DISCONTINUED | OUTPATIENT
Start: 2025-05-23 | End: 2025-05-23 | Stop reason: HOSPADM

## 2025-05-23 RX ORDER — FAMOTIDINE 10 MG/ML
20 INJECTION, SOLUTION INTRAVENOUS ONCE AS NEEDED
Status: DISCONTINUED | OUTPATIENT
Start: 2025-05-23 | End: 2025-05-23 | Stop reason: HOSPADM

## 2025-05-23 RX ORDER — ALBUTEROL SULFATE 0.83 MG/ML
3 SOLUTION RESPIRATORY (INHALATION) AS NEEDED
Status: DISCONTINUED | OUTPATIENT
Start: 2025-05-23 | End: 2025-05-23 | Stop reason: HOSPADM

## 2025-05-23 NOTE — PROGRESS NOTES
Pt here for intravesical BCG TX. Consent completed prior to TX series. Pt had positive nitrites on 5/16/25, did not receive BCG, and was treated accordingly with antibiotics. Pt completed antibiotics and denies signs or symptoms of UTI. Urine dipped I/O prior to BCG. 14 Fr catheter inserted using sterile technique without difficulty. BCG 50 mg reconstituted in preservative free saline, administered intravesically, then catheter was removed. Pt re-educated on post procedure instructions and verbalized understanding. Pt will call with any questions or concerns.

## 2025-05-28 DIAGNOSIS — C68.9 UROTHELIAL CARCINOMA: ICD-10-CM

## 2025-05-29 ENCOUNTER — APPOINTMENT (OUTPATIENT)
Dept: UROLOGY | Facility: CLINIC | Age: 65
End: 2025-05-29
Payer: MEDICARE

## 2025-05-29 VITALS — HEART RATE: 82 BPM | DIASTOLIC BLOOD PRESSURE: 69 MMHG | SYSTOLIC BLOOD PRESSURE: 124 MMHG | TEMPERATURE: 97.1 F

## 2025-05-29 DIAGNOSIS — C67.6 MALIGNANT NEOPLASM OF URETERIC ORIFICE (MULTI): Primary | ICD-10-CM

## 2025-05-29 DIAGNOSIS — N39.0 RECURRENT UTI: ICD-10-CM

## 2025-05-29 DIAGNOSIS — R35.0 URINARY FREQUENCY: ICD-10-CM

## 2025-05-29 LAB
POC APPEARANCE, URINE: CLEAR
POC BILIRUBIN, URINE: NEGATIVE
POC BLOOD, URINE: ABNORMAL
POC COLOR, URINE: YELLOW
POC GLUCOSE, URINE: NEGATIVE MG/DL
POC KETONES, URINE: ABNORMAL MG/DL
POC LEUKOCYTES, URINE: ABNORMAL
POC NITRITE,URINE: POSITIVE
POC PH, URINE: 6 PH
POC PROTEIN, URINE: ABNORMAL MG/DL
POC SPECIFIC GRAVITY, URINE: 1.02
POC UROBILINOGEN, URINE: 0.2 EU/DL

## 2025-05-29 PROCEDURE — 81003 URINALYSIS AUTO W/O SCOPE: CPT | Performed by: NURSE PRACTITIONER

## 2025-05-29 PROCEDURE — 99212 OFFICE O/P EST SF 10 MIN: CPT | Performed by: NURSE PRACTITIONER

## 2025-05-29 RX ORDER — NITROFURANTOIN 25; 75 MG/1; MG/1
CAPSULE ORAL
Qty: 30 CAPSULE | Refills: 2 | Status: SHIPPED | OUTPATIENT
Start: 2025-05-29 | End: 2025-07-05

## 2025-05-29 NOTE — PROGRESS NOTES
Procedure Visit on 05/29/2025   Component Date Value Ref Range Status    POC Color, Urine 05/29/2025 Yellow  Straw, Yellow, Light-Yellow Final    POC Appearance, Urine 05/29/2025 Clear  Clear Final    POC Glucose, Urine 05/29/2025 NEGATIVE  NEGATIVE mg/dl Final    POC Bilirubin, Urine 05/29/2025 NEGATIVE  NEGATIVE Final    POC Ketones, Urine 05/29/2025 TRACE (A)  NEGATIVE mg/dl Final    POC Specific Gravity, Urine 05/29/2025 1.020  1.005 - 1.035 Final    POC Blood, Urine 05/29/2025 MODERATE (2+) (A)  NEGATIVE Final    POC PH, Urine 05/29/2025 6.0  No Reference Range Established PH Final    POC Protein, Urine 05/29/2025 >=300 (3+) (A)  NEGATIVE mg/dl Final    POC Urobilinogen, Urine 05/29/2025 0.2  0.2, 1.0 EU/DL Final    Poc Nitrite, Urine 05/29/2025 POSITIVE (A)  NEGATIVE Final    POC Leukocytes, Urine 05/29/2025 SMALL (1+) (A)  NEGATIVE Final     Patient is again symptomatic regarding UTI. Will send for culture and start on macrobid. Discussed daily preventative dosing throughout duration of BCG tx. Patient is in agreement with plan.

## 2025-06-01 LAB
APPEARANCE UR: ABNORMAL
BACTERIA #/AREA URNS HPF: ABNORMAL /HPF
BACTERIA UR CULT: ABNORMAL
BILIRUB UR QL STRIP: NEGATIVE
COLOR UR: ABNORMAL
GLUCOSE UR QL STRIP: NEGATIVE
HGB UR QL STRIP: ABNORMAL
HYALINE CASTS #/AREA URNS LPF: ABNORMAL /LPF
KETONES UR QL STRIP: ABNORMAL
LEUKOCYTE ESTERASE UR QL STRIP: ABNORMAL
NITRITE UR QL STRIP: POSITIVE
PH UR STRIP: 5.5 [PH] (ref 5–8)
PROT UR QL STRIP: ABNORMAL
RBC #/AREA URNS HPF: ABNORMAL /HPF
SERVICE CMNT-IMP: ABNORMAL
SP GR UR STRIP: 1.02 (ref 1–1.03)
SQUAMOUS #/AREA URNS HPF: ABNORMAL /HPF
URATE CRY #/AREA URNS HPF: ABNORMAL /HPF
WBC #/AREA URNS HPF: ABNORMAL /HPF

## 2025-06-04 DIAGNOSIS — C68.9 UROTHELIAL CARCINOMA: ICD-10-CM

## 2025-06-05 ENCOUNTER — APPOINTMENT (OUTPATIENT)
Dept: UROLOGY | Facility: CLINIC | Age: 65
End: 2025-06-05
Payer: MEDICARE

## 2025-06-05 VITALS — SYSTOLIC BLOOD PRESSURE: 112 MMHG | HEART RATE: 65 BPM | DIASTOLIC BLOOD PRESSURE: 55 MMHG

## 2025-06-05 DIAGNOSIS — R35.0 URINARY FREQUENCY: ICD-10-CM

## 2025-06-05 DIAGNOSIS — C68.9 UROTHELIAL CARCINOMA: Primary | ICD-10-CM

## 2025-06-05 LAB
POC APPEARANCE, URINE: CLEAR
POC BILIRUBIN, URINE: NEGATIVE
POC BLOOD, URINE: NEGATIVE
POC COLOR, URINE: YELLOW
POC GLUCOSE, URINE: NEGATIVE MG/DL
POC KETONES, URINE: NEGATIVE MG/DL
POC LEUKOCYTES, URINE: NEGATIVE
POC NITRITE,URINE: NEGATIVE
POC PH, URINE: 6 PH
POC PROTEIN, URINE: ABNORMAL MG/DL
POC SPECIFIC GRAVITY, URINE: 1.01
POC UROBILINOGEN, URINE: 0.2 EU/DL

## 2025-06-05 PROCEDURE — 81003 URINALYSIS AUTO W/O SCOPE: CPT | Performed by: NURSE PRACTITIONER

## 2025-06-05 PROCEDURE — 51720 TREATMENT OF BLADDER LESION: CPT | Performed by: NURSE PRACTITIONER

## 2025-06-05 RX ORDER — DIPHENHYDRAMINE HYDROCHLORIDE 50 MG/ML
50 INJECTION, SOLUTION INTRAMUSCULAR; INTRAVENOUS AS NEEDED
Status: DISCONTINUED | OUTPATIENT
Start: 2025-06-05 | End: 2025-06-05 | Stop reason: HOSPADM

## 2025-06-05 RX ORDER — EPINEPHRINE 0.3 MG/.3ML
0.3 INJECTION SUBCUTANEOUS EVERY 5 MIN PRN
Status: DISCONTINUED | OUTPATIENT
Start: 2025-06-05 | End: 2025-06-05 | Stop reason: HOSPADM

## 2025-06-05 RX ORDER — METHENAMINE, SODIUM PHOSPHATE, MONOBASIC, MONOHYDRATE, PHENYL SALICYLATE, METHYLENE BLUE, AND HYOSCYAMINE SULFATE 118; 40.8; 36; 10; .12 MG/1; MG/1; MG/1; MG/1; MG/1
1 CAPSULE ORAL EVERY 6 HOURS PRN
Qty: 28 CAPSULE | Refills: 1 | Status: SHIPPED | OUTPATIENT
Start: 2025-06-05 | End: 2025-07-31

## 2025-06-05 RX ORDER — ALBUTEROL SULFATE 0.83 MG/ML
3 SOLUTION RESPIRATORY (INHALATION) AS NEEDED
Status: DISCONTINUED | OUTPATIENT
Start: 2025-06-05 | End: 2025-06-05 | Stop reason: HOSPADM

## 2025-06-05 RX ORDER — FAMOTIDINE 10 MG/ML
20 INJECTION, SOLUTION INTRAVENOUS ONCE AS NEEDED
Status: DISCONTINUED | OUTPATIENT
Start: 2025-06-05 | End: 2025-06-05 | Stop reason: HOSPADM

## 2025-06-05 NOTE — PROGRESS NOTES
Patient ID: Cuca Laguerre is a 64 y.o. female.    Bladder Catheterization    Date/Time: 6/5/2025 11:47 AM    Performed by: DANN Elizabeth  Authorized by: DANN Elizabeth    Procedure Details    Procedure: bladder instillation      Catheter type: Zamorano      Catheter size: 14 Fr    Urine characteristics: clear and yellow    Additional Details      BCG 3/6            Procedure Visit on 06/05/2025   Component Date Value Ref Range Status    POC Color, Urine 06/05/2025 Yellow  Straw, Yellow, Light-Yellow Final    POC Appearance, Urine 06/05/2025 Clear  Clear Final    POC Glucose, Urine 06/05/2025 NEGATIVE  NEGATIVE mg/dl Final    POC Bilirubin, Urine 06/05/2025 NEGATIVE  NEGATIVE Final    POC Ketones, Urine 06/05/2025 NEGATIVE  NEGATIVE mg/dl Final    POC Specific Gravity, Urine 06/05/2025 1.015  1.005 - 1.035 Final    POC Blood, Urine 06/05/2025 NEGATIVE  NEGATIVE Final    POC PH, Urine 06/05/2025 6.0  No Reference Range Established PH Final    POC Protein, Urine 06/05/2025 30 (1+) (A)  NEGATIVE mg/dl Final    POC Urobilinogen, Urine 06/05/2025 0.2  0.2, 1.0 EU/DL Final    Poc Nitrite, Urine 06/05/2025 NEGATIVE  NEGATIVE Final    POC Leukocytes, Urine 06/05/2025 NEGATIVE  NEGATIVE Final

## 2025-06-13 ENCOUNTER — APPOINTMENT (OUTPATIENT)
Dept: UROLOGY | Facility: CLINIC | Age: 65
End: 2025-06-13
Payer: MEDICARE

## 2025-06-13 DIAGNOSIS — C68.9 UROTHELIAL CARCINOMA: ICD-10-CM

## 2025-06-13 DIAGNOSIS — C67.6 MALIGNANT NEOPLASM OF URETERIC ORIFICE (MULTI): Primary | ICD-10-CM

## 2025-06-13 LAB
POC APPEARANCE, URINE: CLEAR
POC BILIRUBIN, URINE: NEGATIVE
POC BLOOD, URINE: NEGATIVE
POC COLOR, URINE: YELLOW
POC GLUCOSE, URINE: NEGATIVE MG/DL
POC KETONES, URINE: NEGATIVE MG/DL
POC LEUKOCYTES, URINE: NEGATIVE
POC NITRITE,URINE: NEGATIVE
POC PH, URINE: 6 PH
POC PROTEIN, URINE: NEGATIVE MG/DL
POC SPECIFIC GRAVITY, URINE: 1.01
POC UROBILINOGEN, URINE: 0.2 EU/DL

## 2025-06-13 RX ORDER — DIPHENHYDRAMINE HYDROCHLORIDE 50 MG/ML
50 INJECTION, SOLUTION INTRAMUSCULAR; INTRAVENOUS AS NEEDED
Status: DISCONTINUED | OUTPATIENT
Start: 2025-06-13 | End: 2025-06-13 | Stop reason: HOSPADM

## 2025-06-13 RX ORDER — FAMOTIDINE 10 MG/ML
20 INJECTION, SOLUTION INTRAVENOUS ONCE AS NEEDED
Status: DISCONTINUED | OUTPATIENT
Start: 2025-06-13 | End: 2025-06-13 | Stop reason: HOSPADM

## 2025-06-13 RX ORDER — ALBUTEROL SULFATE 0.83 MG/ML
3 SOLUTION RESPIRATORY (INHALATION) AS NEEDED
Status: DISCONTINUED | OUTPATIENT
Start: 2025-06-13 | End: 2025-06-13 | Stop reason: HOSPADM

## 2025-06-13 RX ORDER — EPINEPHRINE 0.3 MG/.3ML
0.3 INJECTION SUBCUTANEOUS EVERY 5 MIN PRN
Status: DISCONTINUED | OUTPATIENT
Start: 2025-06-13 | End: 2025-06-13 | Stop reason: HOSPADM

## 2025-06-13 NOTE — PROGRESS NOTES
Pt here for 5/6 intravesical BCG TX. Consent completed prior to TX series. Urine dipped I/O prior to BCG. 14 Fr catheter inserted using sterile technique without difficulty. BCG 50 mg reconstituted in preservative free saline, administered intravesically, then catheter was removed. Pt aware of post procedure instructions and will call with any questions or concerns.

## 2025-06-19 ENCOUNTER — APPOINTMENT (OUTPATIENT)
Dept: UROLOGY | Facility: CLINIC | Age: 65
End: 2025-06-19
Payer: MEDICARE

## 2025-06-19 DIAGNOSIS — C68.9 UROTHELIAL CARCINOMA: Primary | ICD-10-CM

## 2025-06-19 PROCEDURE — 81002 URINALYSIS NONAUTO W/O SCOPE: CPT | Performed by: UROLOGY

## 2025-06-19 PROCEDURE — 51720 TREATMENT OF BLADDER LESION: CPT | Performed by: UROLOGY

## 2025-06-19 RX ORDER — FAMOTIDINE 10 MG/ML
20 INJECTION, SOLUTION INTRAVENOUS ONCE AS NEEDED
Status: DISCONTINUED | OUTPATIENT
Start: 2025-06-19 | End: 2025-06-23 | Stop reason: HOSPADM

## 2025-06-19 RX ORDER — EPINEPHRINE 0.3 MG/.3ML
0.3 INJECTION SUBCUTANEOUS EVERY 5 MIN PRN
Status: DISCONTINUED | OUTPATIENT
Start: 2025-06-19 | End: 2025-06-23 | Stop reason: HOSPADM

## 2025-06-19 RX ORDER — ALBUTEROL SULFATE 0.83 MG/ML
3 SOLUTION RESPIRATORY (INHALATION) AS NEEDED
Status: DISCONTINUED | OUTPATIENT
Start: 2025-06-19 | End: 2025-06-23 | Stop reason: HOSPADM

## 2025-06-19 RX ORDER — DIPHENHYDRAMINE HYDROCHLORIDE 50 MG/ML
50 INJECTION, SOLUTION INTRAMUSCULAR; INTRAVENOUS AS NEEDED
Status: DISCONTINUED | OUTPATIENT
Start: 2025-06-19 | End: 2025-06-23 | Stop reason: HOSPADM

## 2025-06-27 ENCOUNTER — APPOINTMENT (OUTPATIENT)
Dept: UROLOGY | Facility: CLINIC | Age: 65
End: 2025-06-27
Payer: MEDICARE

## 2025-06-27 VITALS — TEMPERATURE: 96.5 F | HEART RATE: 75 BPM | DIASTOLIC BLOOD PRESSURE: 75 MMHG | SYSTOLIC BLOOD PRESSURE: 125 MMHG

## 2025-06-27 DIAGNOSIS — C67.6 MALIGNANT NEOPLASM OF URETERIC ORIFICE (MULTI): Primary | ICD-10-CM

## 2025-06-27 DIAGNOSIS — C68.9 UROTHELIAL CARCINOMA: ICD-10-CM

## 2025-06-27 LAB
POC APPEARANCE, URINE: CLEAR
POC BILIRUBIN, URINE: NEGATIVE
POC BLOOD, URINE: NEGATIVE
POC COLOR, URINE: YELLOW
POC GLUCOSE, URINE: NEGATIVE MG/DL
POC KETONES, URINE: NEGATIVE MG/DL
POC LEUKOCYTES, URINE: NEGATIVE
POC NITRITE,URINE: NEGATIVE
POC PH, URINE: 5.5 PH
POC PROTEIN, URINE: ABNORMAL MG/DL
POC SPECIFIC GRAVITY, URINE: >=1.03
POC UROBILINOGEN, URINE: 0.2 EU/DL

## 2025-06-27 PROCEDURE — 81003 URINALYSIS AUTO W/O SCOPE: CPT | Performed by: NURSE PRACTITIONER

## 2025-06-27 PROCEDURE — 51720 TREATMENT OF BLADDER LESION: CPT | Performed by: UROLOGY

## 2025-06-27 RX ORDER — FAMOTIDINE 10 MG/ML
20 INJECTION, SOLUTION INTRAVENOUS ONCE AS NEEDED
Status: DISCONTINUED | OUTPATIENT
Start: 2025-06-27 | End: 2025-06-27 | Stop reason: HOSPADM

## 2025-06-27 RX ORDER — ALBUTEROL SULFATE 0.83 MG/ML
3 SOLUTION RESPIRATORY (INHALATION) AS NEEDED
Status: DISCONTINUED | OUTPATIENT
Start: 2025-06-27 | End: 2025-06-27 | Stop reason: HOSPADM

## 2025-06-27 RX ORDER — DIPHENHYDRAMINE HYDROCHLORIDE 50 MG/ML
50 INJECTION, SOLUTION INTRAMUSCULAR; INTRAVENOUS AS NEEDED
Status: DISCONTINUED | OUTPATIENT
Start: 2025-06-27 | End: 2025-06-27 | Stop reason: HOSPADM

## 2025-06-27 RX ORDER — EPINEPHRINE 0.3 MG/.3ML
0.3 INJECTION SUBCUTANEOUS EVERY 5 MIN PRN
Status: DISCONTINUED | OUTPATIENT
Start: 2025-06-27 | End: 2025-06-27 | Stop reason: HOSPADM

## 2025-06-27 NOTE — PROGRESS NOTES
Procedure Visit on 06/27/2025   Component Date Value Ref Range Status   • POC Color, Urine 06/27/2025 Yellow  Straw, Yellow, Light-Yellow Final   • POC Appearance, Urine 06/27/2025 Clear  Clear Final   • POC Glucose, Urine 06/27/2025 NEGATIVE  NEGATIVE mg/dl Final   • POC Bilirubin, Urine 06/27/2025 NEGATIVE  NEGATIVE Final   • POC Ketones, Urine 06/27/2025 NEGATIVE  NEGATIVE mg/dl Final   • POC Specific Gravity, Urine 06/27/2025 >=1.030  1.005 - 1.035 Final   • POC Blood, Urine 06/27/2025 NEGATIVE  NEGATIVE Final   • POC PH, Urine 06/27/2025 5.5  No Reference Range Established PH Final   • POC Protein, Urine 06/27/2025 100 (2+) (A)  NEGATIVE mg/dl Final   • POC Urobilinogen, Urine 06/27/2025 0.2  0.2, 1.0 EU/DL Final   • Poc Nitrite, Urine 06/27/2025 NEGATIVE  NEGATIVE Final   • POC Leukocytes, Urine 06/27/2025 NEGATIVE  NEGATIVE Final     Patient ID: Cuca Laguerre is a 64 y.o. female.    Bladder Catheterization    Date/Time: 6/27/2025 1:23 PM    Performed by: DANN Elizabeth  Authorized by: DANN Elizabeth    Procedure Details    Procedure: bladder instillation      Position: supine    Catheter type: red rubber      Urine characteristics: clear and yellow    Post-Procedure Details     Outcome: patient tolerated procedure well with no complications      Post-procedure interventions: post-procedure education provided      Disposition: discharged home in satisfactory condition      Additional Details      BCG 6/6

## 2025-07-29 DIAGNOSIS — I10 ESSENTIAL HYPERTENSION, BENIGN: ICD-10-CM

## 2025-07-29 RX ORDER — METOPROLOL TARTRATE 50 MG/1
50 TABLET ORAL 2 TIMES DAILY
Qty: 180 TABLET | Refills: 3 | Status: SHIPPED | OUTPATIENT
Start: 2025-07-29 | End: 2026-07-29

## 2025-07-29 NOTE — TELEPHONE ENCOUNTER
Received request for prescription refills for patient.   Patient follows with Dr. Bernard    Request is for metoprolol tartrate  Is patient currently on medication yes    Last OV 3/13/2025  Next OV 3/12/2026    Pended for signing and sent to provider

## 2025-08-08 ENCOUNTER — APPOINTMENT (OUTPATIENT)
Dept: UROLOGY | Facility: CLINIC | Age: 65
End: 2025-08-08
Payer: MEDICARE

## 2025-08-08 VITALS — SYSTOLIC BLOOD PRESSURE: 124 MMHG | DIASTOLIC BLOOD PRESSURE: 64 MMHG | TEMPERATURE: 97.3 F | HEART RATE: 58 BPM

## 2025-08-08 DIAGNOSIS — C68.9 UROTHELIAL CARCINOMA: ICD-10-CM

## 2025-08-08 DIAGNOSIS — R35.0 URINARY FREQUENCY: ICD-10-CM

## 2025-08-08 LAB
POC APPEARANCE, URINE: CLEAR
POC BILIRUBIN, URINE: NEGATIVE
POC BLOOD, URINE: NEGATIVE
POC COLOR, URINE: YELLOW
POC GLUCOSE, URINE: NEGATIVE MG/DL
POC KETONES, URINE: NEGATIVE MG/DL
POC LEUKOCYTES, URINE: NEGATIVE
POC NITRITE,URINE: NEGATIVE
POC PH, URINE: 6 PH
POC PROTEIN, URINE: NEGATIVE MG/DL
POC SPECIFIC GRAVITY, URINE: 1.02
POC UROBILINOGEN, URINE: 0.2 EU/DL

## 2025-08-08 RX ORDER — MIRABEGRON 50 MG/1
50 TABLET, FILM COATED, EXTENDED RELEASE ORAL DAILY
Qty: 100 TABLET | Refills: 3 | Status: SHIPPED | OUTPATIENT
Start: 2025-08-08

## 2025-08-08 ASSESSMENT — ENCOUNTER SYMPTOMS: DEPRESSION: 0

## 2025-08-08 ASSESSMENT — PATIENT HEALTH QUESTIONNAIRE - PHQ9
SUM OF ALL RESPONSES TO PHQ9 QUESTIONS 1 AND 2: 0
1. LITTLE INTEREST OR PLEASURE IN DOING THINGS: NOT AT ALL
2. FEELING DOWN, DEPRESSED OR HOPELESS: NOT AT ALL

## 2025-08-08 NOTE — PROGRESS NOTES
CHIEF COMPLAINT:  Ms. Laguerre presents to the office today to followup bladder cancer diagnosed 2025 for Ta high-grade urothelial carcinoma.  NCCN risk group unknown    BLADDER CANCER HISTORY:  64-year-old female with a history of bladder cancer. Initially diagnosed with papillary urothelial neoplasm of low malignant potential in July 2022 under Dr. Adames's care. Received 6-week course of BCG therapy in summer 2022. First recurrence noted in March 2024 with papillary urothelial neoplasm of low malignant potential. Previously followed by Dr. Pitt. Underwent repeat TURBT in April 2025 showing 3 cm right anterior bladder tumor that was papillary on narrow base. Pathology revealed high-grade urothelial carcinoma, Ta (non-invasive).    KEY INFORMATION:  - Diagnosis: 2022 papillary urothelial neoplasm of low malignant potential, 2024 recurrence, 2025 Ta high-grade urothelial carcinoma  - Cytology: not known  - Upper tract imaging history: not known  - Intravesical therapy history: BCG 2022 (6 weeks), BCG induction 2025 (completed June 2025)  - Other relevant information: Former oncology nurse, anxiety about progression from low-grade to high-grade disease    PROCEDURE HISTORY:  1. TURBTs and OR PROCEDURES:  - TURBT/Biopsy History: July 2022 papillary urothelial neoplasm of low malignant potential, March 2024 papillary urothelial neoplasm of low malignant potential, 04/2025 Ta high-grade urothelial carcinoma    2. OFFICE CYSTOSCOPIES:  - Office cystoscopy History: 08/08/2025 bladder WNL  - 4 months since last cystoscopy    HPI TODAY 08/08/2025:  - Completed induction BCG in June 2025  - Reports BCG therapy was difficult this time compared to first course in 2022  - Had recurrent UTIs during treatment requiring daily antibiotics  - Describes therapy as painful and rough  - Trial of Gemtesa 75 mg for OAB symptoms tolerable    PMH, PSH, MEDS, ALLERGIES, SH, and FH:  Information reviewed with patient and in EMR, with changes  made where appropriate.  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 female.    Cystoscopy    Date/Time: 8/8/2025 1:05 PM    Performed by: Alexis Felix MD  Authorized by: Alexis Felix MD      Comments:      Indications: Bladder cancer surveillance  After proper informed consent was obtained, and procedural time out, the flexible cystoscope was inserted per urethra into the bladder. The anterior urethra was unremarkable. The bladder was examined systematically, including scope retroflexion. Ureteral orifices were normal in size, number and location effluxing clear urine. All visible mucosa was without tumors, stones, or foreign bodies.        ASSESSMENT AND PLAN:  64-year-old female with history of bladder cancer. Ta high-grade urothelial carcinoma NCCN risk group unknown.    1. Bladder cancer Ta high-grade (C67.9)  - Assessment: First cystoscopy post-induction BCG shows no evidence of disease  - Plan: Maintenance BCG half dose starting September 2025, 3 doses at 3-month intervals  - Counseling: Half dose recommended due to BCG shortage and patient's difficulty tolerating full dose    2. Overactive bladder (N32.81)  - Assessment: Symptoms improved on Gemtesa 75 mg  - Plan: Continue current therapy    ORDERS:  No orders.    FOLLOW UP:  Maintenance BCG therapy starting early September 2025.    SHORT SUMMARY:  First post-BCG cystoscopy shows no evidence of disease. Plan for maintenance BCG half dose starting September.

## 2025-08-18 ENCOUNTER — TELEPHONE (OUTPATIENT)
Dept: UROLOGY | Facility: CLINIC | Age: 65
End: 2025-08-18
Payer: MEDICARE

## 2025-08-26 ENCOUNTER — APPOINTMENT (OUTPATIENT)
Dept: GASTROENTEROLOGY | Facility: CLINIC | Age: 65
End: 2025-08-26
Payer: MEDICARE

## 2025-08-26 VITALS
RESPIRATION RATE: 16 BRPM | HEIGHT: 62 IN | BODY MASS INDEX: 30 KG/M2 | SYSTOLIC BLOOD PRESSURE: 129 MMHG | HEART RATE: 59 BPM | DIASTOLIC BLOOD PRESSURE: 69 MMHG | WEIGHT: 163 LBS | OXYGEN SATURATION: 97 %

## 2025-08-26 DIAGNOSIS — K21.9 GASTROESOPHAGEAL REFLUX DISEASE, UNSPECIFIED WHETHER ESOPHAGITIS PRESENT: ICD-10-CM

## 2025-08-26 DIAGNOSIS — D50.9 IRON DEFICIENCY ANEMIA, UNSPECIFIED IRON DEFICIENCY ANEMIA TYPE: Primary | ICD-10-CM

## 2025-08-26 DIAGNOSIS — K55.20 AVM (ARTERIOVENOUS MALFORMATION) OF SMALL BOWEL, ACQUIRED: ICD-10-CM

## 2025-08-26 PROCEDURE — 3074F SYST BP LT 130 MM HG: CPT

## 2025-08-26 PROCEDURE — G2211 COMPLEX E/M VISIT ADD ON: HCPCS

## 2025-08-26 PROCEDURE — 3078F DIAST BP <80 MM HG: CPT

## 2025-08-26 PROCEDURE — 99214 OFFICE O/P EST MOD 30 MIN: CPT

## 2025-08-26 PROCEDURE — 4010F ACE/ARB THERAPY RXD/TAKEN: CPT

## 2025-08-26 PROCEDURE — 3008F BODY MASS INDEX DOCD: CPT

## 2025-08-26 RX ORDER — PANTOPRAZOLE SODIUM 40 MG/1
40 TABLET, DELAYED RELEASE ORAL
Qty: 90 TABLET | Refills: 3 | Status: SHIPPED | OUTPATIENT
Start: 2025-08-26 | End: 2025-11-24

## 2025-08-26 ASSESSMENT — ENCOUNTER SYMPTOMS
NERVOUS/ANXIOUS: 0
VOICE CHANGE: 0
JOINT SWELLING: 0
WEAKNESS: 0
COLOR CHANGE: 0
MYALGIAS: 0
CHOKING: 0
FEVER: 0
VOMITING: 0
FLANK PAIN: 0
RECTAL PAIN: 0
TREMORS: 0
ABDOMINAL PAIN: 0
APPETITE CHANGE: 0
BRUISES/BLEEDS EASILY: 0
DIZZINESS: 0
CHILLS: 0
TROUBLE SWALLOWING: 0
SEIZURES: 0
HEMATURIA: 0
BLOOD IN STOOL: 0
UNEXPECTED WEIGHT CHANGE: 0
ANAL BLEEDING: 0
ARTHRALGIAS: 0
ROS GI COMMENTS: REGURGITATION
DIARRHEA: 0
ABDOMINAL DISTENTION: 0
CONFUSION: 0
CONSTIPATION: 0
AGITATION: 0
PALPITATIONS: 0
NAUSEA: 1
SHORTNESS OF BREATH: 0
LIGHT-HEADEDNESS: 0
COUGH: 0

## 2025-08-29 ENCOUNTER — TELEPHONE (OUTPATIENT)
Dept: UROLOGY | Facility: CLINIC | Age: 65
End: 2025-08-29
Payer: MEDICARE

## 2025-09-18 ENCOUNTER — APPOINTMENT (OUTPATIENT)
Dept: UROLOGY | Facility: CLINIC | Age: 65
End: 2025-09-18
Payer: MEDICARE

## 2025-09-25 ENCOUNTER — APPOINTMENT (OUTPATIENT)
Dept: UROLOGY | Facility: CLINIC | Age: 65
End: 2025-09-25
Payer: MEDICARE

## 2025-10-02 ENCOUNTER — APPOINTMENT (OUTPATIENT)
Dept: UROLOGY | Facility: CLINIC | Age: 65
End: 2025-10-02
Payer: MEDICARE

## 2025-11-17 ENCOUNTER — APPOINTMENT (OUTPATIENT)
Dept: UROLOGY | Facility: CLINIC | Age: 65
End: 2025-11-17
Payer: MEDICARE

## 2025-12-30 ENCOUNTER — APPOINTMENT (OUTPATIENT)
Dept: GASTROENTEROLOGY | Facility: CLINIC | Age: 65
End: 2025-12-30
Payer: MEDICARE

## 2026-03-12 ENCOUNTER — APPOINTMENT (OUTPATIENT)
Dept: CARDIOLOGY | Facility: CLINIC | Age: 66
End: 2026-03-12
Payer: MEDICARE

## (undated) DEVICE — DRAINBAG, 15.5 X 31, 2600/2800 UROVIEW, STERILE

## (undated) DEVICE — HOLSTER, ELECTROSURGERY ACCESSORY, STERILE

## (undated) DEVICE — SPONGE GZ W4XL4IN COT 12 PLY TYP VII WVN C FLD DSGN

## (undated) DEVICE — TOWEL,OR,DSP,ST,BLUE,STD,4/PK,20PK/CS: Brand: MEDLINE

## (undated) DEVICE — SOLUTION, IRRIGATION, SODIUM CHLORIDE 0.9%, 1000 ML, POUR BOTTLE

## (undated) DEVICE — Device

## (undated) DEVICE — BAG DRAINAGE URIN LIGEMAN W/ ADPT SUCTION HOSE CYSTO URO

## (undated) DEVICE — DRESSING, NON-ADHERENT, TELFA, OUCHLESS, 3 X 8 IN, STERILE

## (undated) DEVICE — SET,IRRIGATION,CYSTO/TUR: Brand: MEDLINE

## (undated) DEVICE — BOWL, BASIN, 32 OZ, STERILE

## (undated) DEVICE — GLOVE, SURGICAL, PROTEXIS PI BLUE W/NEUTHERA, 8.0, PF, LF

## (undated) DEVICE — TRAY, SKIN SCRUB, WET PREP, WITH 4 COMPARMENT

## (undated) DEVICE — PAD N ADH W3XL4IN POLY COT SFT PERF FLM EASILY CUT ABSRB

## (undated) DEVICE — SKIN PREP TRAY 4 COMPARTM TRAY: Brand: MEDLINE INDUSTRIES, INC.

## (undated) DEVICE — ELECTRODE PT RET AD L9FT HI MOIST COND ADH HYDRGEL CORDED

## (undated) DEVICE — GOWN, SURGICAL, ROYAL SILK, XL, STERILE

## (undated) DEVICE — GUIDEWIRE URO L150CM DIA0.035IN TAPR 8CM STR TIP STD SHFT

## (undated) DEVICE — GLOVE, SURGICAL, PROTEXIS PI , 7.5, PF, LF

## (undated) DEVICE — TRAY, MINOR, SINGLE BASIN, STERILE

## (undated) DEVICE — TUBING, SUCTION, 6MM X 10, CLEAN N-COND

## (undated) DEVICE — SYRINGE, 20 CC, LUER LOCK

## (undated) DEVICE — ANGLED TIP URETERAL CATHETER WITH BENTSON PTFE WIRE GUIDE: Brand: ANGLED TIP

## (undated) DEVICE — PROTECTOR, NERVE, ULNAR, PINK

## (undated) DEVICE — GOWN,AURORA,NONREINFORCED,LARGE: Brand: MEDLINE

## (undated) DEVICE — JELLY,LUBE,STERILE,FLIP TOP,TUBE,2-OZ: Brand: MEDLINE

## (undated) DEVICE — CONTAINER,SPECIMEN,OR STERILE,4OZ: Brand: MEDLINE

## (undated) DEVICE — SOLUTION, IRRIGATION, STERILE WATER, 1000 ML, POUR BOTTLE

## (undated) DEVICE — GLOVE ORANGE PI 8   MSG9080

## (undated) DEVICE — CABLE ENDOSCP L10FT ACT DISP

## (undated) DEVICE — FOOT SWITCH DRAPE: Brand: UNBRANDED

## (undated) DEVICE — STATLOCK, FOLEY SECURE, 3-WAY

## (undated) DEVICE — EVACUATOR, BLADDER, ELLIK, PLASTIC

## (undated) DEVICE — SOLUTION, SODIUM CHLORIDE 0.9%, 3000ML, BAG

## (undated) DEVICE — LABEL MED MINI W/ MARKER

## (undated) DEVICE — DBD-PACK,CYSTOSCOPY,PK VI,AURORA: Brand: MEDLINE

## (undated) DEVICE — ELECTRODE, HF, ESG PLASMA LOOP-MEDIUM 30 DEG

## (undated) DEVICE — EVACUATOR URO BLDR W/ ADPT UROVAC

## (undated) DEVICE — GOWN,SIRUS,POLYRNF,BRTHSLV,XLN/XL,20/CS: Brand: MEDLINE

## (undated) DEVICE — SYRINGE MED 10ML LUERLOCK TIP W/O SFTY DISP